# Patient Record
Sex: MALE | Race: WHITE | Employment: OTHER | URBAN - METROPOLITAN AREA
[De-identification: names, ages, dates, MRNs, and addresses within clinical notes are randomized per-mention and may not be internally consistent; named-entity substitution may affect disease eponyms.]

---

## 2017-02-03 ENCOUNTER — HOSPITAL ENCOUNTER (OUTPATIENT)
Dept: OTHER | Age: 72
Discharge: HOME | End: 2017-02-03
Attending: INTERNAL MEDICINE | Admitting: INTERNAL MEDICINE

## 2017-02-03 LAB
FERRITIN: 89 NG/ML (ref 30–400)
IRON: 99 UG/DL (ref 59–158)
PCT TRANSFERRIN: 29 % (ref 10–44)
TOTAL IRON BINDING CAPACITY: 344 UG/DL (ref 250–450)
UNSATURATED IRON BINDING CAPACITY: 245 UG/DL (ref 110–370)
VITAMIN B-12: 218 PG/ML (ref 211–911)

## 2017-02-06 LAB — METHYLMALONIC ACID: 0.38

## 2017-08-09 ENCOUNTER — HOSPITAL ENCOUNTER (OUTPATIENT)
Dept: OTHER | Age: 72
Discharge: HOME | End: 2017-08-09
Attending: INTERNAL MEDICINE | Admitting: INTERNAL MEDICINE

## 2017-08-09 LAB
FERRITIN: 96 NG/ML (ref 30–400)
IRON: 66 UG/DL (ref 59–158)
PCT TRANSFERRIN: 20 % (ref 10–44)
TOTAL IRON BINDING CAPACITY: 337 UG/DL (ref 250–450)
UNSATURATED IRON BINDING CAPACITY: 271 UG/DL (ref 110–370)
VITAMIN B-12: 194.2 PG/ML (ref 211–911)

## 2017-11-09 ENCOUNTER — HOSPITAL ENCOUNTER (OUTPATIENT)
Dept: OTHER | Age: 72
Discharge: HOME | End: 2017-11-09
Attending: INTERNAL MEDICINE | Admitting: INTERNAL MEDICINE

## 2017-11-09 LAB
FOLATE: >20 NG/ML (ref 3.1–17.5)
IRON: 115 UG/DL (ref 59–158)
PCT TRANSFERRIN: 35 % (ref 10–44)
TOTAL IRON BINDING CAPACITY: 329 UG/DL (ref 250–450)
TSH HIGH SENSITIVITY: 0.96 UIU/ML (ref 0.27–4.2)
UNSATURATED IRON BINDING CAPACITY: 214 UG/DL (ref 110–370)
VITAMIN B-12: 807.4 PG/ML (ref 211–911)

## 2017-11-12 LAB — METHYLMALONIC ACID: 0.25

## 2017-11-13 LAB
GASTRIC PARIETAL CELL ANTIBODY: 3.1
INTRINSIC FACTOR AB: NEGATIVE

## 2018-05-15 ENCOUNTER — HOSPITAL ENCOUNTER (OUTPATIENT)
Dept: OTHER | Age: 73
Discharge: HOME | End: 2018-05-15
Attending: INTERNAL MEDICINE | Admitting: INTERNAL MEDICINE

## 2018-05-15 LAB
ALBUMIN SERPL-MCNC: 4.4 GM/DL (ref 3.4–5)
ALP BLD-CCNC: 39 IU/L (ref 40–129)
ALT SERPL-CCNC: 12 U/L (ref 10–40)
ANION GAP SERPL CALCULATED.3IONS-SCNC: 11 MMOL/L (ref 4–16)
AST SERPL-CCNC: 16 IU/L (ref 15–37)
BILIRUB SERPL-MCNC: 0.3 MG/DL (ref 0–1)
BUN BLDV-MCNC: 19 MG/DL (ref 6–23)
CALCIUM SERPL-MCNC: 9.4 MG/DL (ref 8.3–10.6)
CHLORIDE BLD-SCNC: 105 MMOL/L (ref 99–110)
CO2: 29 MMOL/L (ref 21–32)
CREAT SERPL-MCNC: 1 MG/DL (ref 0.9–1.3)
FERRITIN: 115 NG/ML (ref 30–400)
FOLATE: >20 NG/ML (ref 3.1–17.5)
GFR AFRICAN AMERICAN: >60 ML/MIN/1.73M2
GFR NON-AFRICAN AMERICAN: >60 ML/MIN/1.73M2
GLUCOSE BLD-MCNC: 110 MG/DL (ref 70–99)
IRON: 67 UG/DL (ref 59–158)
PCT TRANSFERRIN: 22 % (ref 10–44)
POTASSIUM SERPL-SCNC: 4.4 MMOL/L (ref 3.5–5.1)
SODIUM BLD-SCNC: 145 MMOL/L (ref 135–145)
TOTAL IRON BINDING CAPACITY: 309 UG/DL (ref 250–450)
TOTAL PROTEIN: 6.5 GM/DL (ref 6.4–8.2)
UNSATURATED IRON BINDING CAPACITY: 242 UG/DL (ref 110–370)
VITAMIN B-12: 1151 PG/ML (ref 211–911)

## 2018-09-28 ENCOUNTER — OFFICE VISIT (OUTPATIENT)
Dept: CARDIOLOGY CLINIC | Age: 73
End: 2018-09-28
Payer: MEDICARE

## 2018-09-28 VITALS
WEIGHT: 168.4 LBS | SYSTOLIC BLOOD PRESSURE: 126 MMHG | BODY MASS INDEX: 26.43 KG/M2 | HEART RATE: 52 BPM | HEIGHT: 67 IN | DIASTOLIC BLOOD PRESSURE: 60 MMHG

## 2018-09-28 DIAGNOSIS — Z76.89 ENCOUNTER TO ESTABLISH CARE: Primary | ICD-10-CM

## 2018-09-28 PROCEDURE — 93000 ELECTROCARDIOGRAM COMPLETE: CPT | Performed by: INTERNAL MEDICINE

## 2018-09-28 PROCEDURE — 99203 OFFICE O/P NEW LOW 30 MIN: CPT | Performed by: INTERNAL MEDICINE

## 2018-09-28 RX ORDER — CARVEDILOL 6.25 MG/1
3.12 TABLET ORAL 2 TIMES DAILY WITH MEALS
COMMUNITY
End: 2021-07-07

## 2018-09-28 RX ORDER — FERROUS SULFATE 325(65) MG
325 TABLET ORAL
COMMUNITY

## 2018-09-28 RX ORDER — VITAMIN B COMPLEX
1 CAPSULE ORAL DAILY
COMMUNITY

## 2018-09-28 RX ORDER — BUDESONIDE 3 MG/1
3 CAPSULE, COATED PELLETS ORAL DAILY
Status: ON HOLD | COMMUNITY
Start: 2018-08-26 | End: 2022-06-28 | Stop reason: HOSPADM

## 2018-09-28 RX ORDER — METFORMIN HYDROCHLORIDE 500 MG/1
500 TABLET, EXTENDED RELEASE ORAL 2 TIMES DAILY WITH MEALS
COMMUNITY

## 2018-09-28 RX ORDER — FENOFIBRATE 145 MG/1
145 TABLET, COATED ORAL DAILY
COMMUNITY
Start: 2018-08-03

## 2018-09-28 NOTE — PROGRESS NOTES
Weight Loss   · Eyes: No Decreased Vision  · ENT: No Headaches, Hearing Loss or Vertigo  · Cardiovascular: No chest pain, dyspnea on exertion, palpitations or loss of consciousness  · Respiratory: No cough or wheezing    · Gastrointestinal: No abdominal pain, appetite loss, blood in stools, constipation, diarrhea or heartburn  · Genitourinary: No dysuria, trouble voiding, or hematuria  · Musculoskeletal:  No gait disturbance, weakness or joint complaints  · Integumentary: No rash or pruritis  · Neurological: No TIA or stroke symptoms  · Psychiatric: No anxiety or depression  · Endocrine: No malaise, fatigue or temperature intolerance  · Hematologic/Lymphatic: No bleeding problems, blood clots or swollen lymph nodes  · Allergic/Immunologic: No nasal congestion or hives  All systems negative except as marked. ·   ·      Physical Examination:    Vitals:    09/28/18 1626   BP: 126/60   Pulse: 52    rr 14  Afebrile    Wt Readings from Last 3 Encounters:   09/28/18 168 lb 6.4 oz (76.4 kg)   12/22/13 175 lb (79.4 kg)   12/20/13 175 lb (79.4 kg)     Body mass index is 26.38 kg/m². General Appearance:  No distress, conversant    Constitutional:  Well developed, Well nourished, No acute distress, Non-toxic appearance. HENT:  Normocephalic, Atraumatic, Bilateral external ears normal, Oropharynx moist, No oral exudates, Nose normal. Neck- Normal range of motion, No tenderness, Supple, No stridor,no apical-carotid delay, no carotid bruit  Eyes:  PERRL, EOMI, Conjunctiva normal, No discharge. Respiratory:  Normal breath sounds, No respiratory distress, No wheezing, No chest tenderness. ,no use of accessory muscles, diaphragm movement is normal  Cardiovascular: (PMI) apex non displaced,no lifts no thrills, no s3,no s4, Normal heart rate, Normal rhythm, No murmurs, No rubs, No gallops.  Carotid arteries pulse and amplitude are normal no bruit, no abdominal bruit noted ( normal abdominal aorta ausculation), femoral arteries

## 2018-11-20 ENCOUNTER — HOSPITAL ENCOUNTER (OUTPATIENT)
Age: 73
Setting detail: SPECIMEN
Discharge: HOME | End: 2018-11-20
Payer: MEDICARE

## 2018-11-20 LAB
FERRITIN: 111 NG/ML (ref 30–400)
IRON: 88 UG/DL (ref 59–158)

## 2018-11-20 PROCEDURE — 83540 ASSAY OF IRON: CPT

## 2018-11-20 PROCEDURE — 82728 ASSAY OF FERRITIN: CPT

## 2018-11-26 ENCOUNTER — NURSE ONLY (OUTPATIENT)
Dept: CARDIOLOGY CLINIC | Age: 73
End: 2018-11-26

## 2018-11-26 VITALS — BODY MASS INDEX: 25.61 KG/M2 | HEIGHT: 68 IN | WEIGHT: 169 LBS

## 2018-11-26 DIAGNOSIS — Z76.89 ENCOUNTER TO ESTABLISH CARE: ICD-10-CM

## 2018-11-26 DIAGNOSIS — R94.31 EKG ABNORMALITIES: Primary | ICD-10-CM

## 2018-11-26 PROCEDURE — 93000 ELECTROCARDIOGRAM COMPLETE: CPT | Performed by: INTERNAL MEDICINE

## 2018-12-01 ENCOUNTER — PROCEDURE VISIT (OUTPATIENT)
Dept: CARDIOLOGY CLINIC | Age: 73
End: 2018-12-01
Payer: MEDICARE

## 2018-12-01 DIAGNOSIS — I65.29 STENOSIS OF CAROTID ARTERY, UNSPECIFIED LATERALITY: ICD-10-CM

## 2018-12-01 DIAGNOSIS — I65.23 CAROTID STENOSIS, BILATERAL: Primary | ICD-10-CM

## 2018-12-01 PROCEDURE — 93880 EXTRACRANIAL BILAT STUDY: CPT | Performed by: INTERNAL MEDICINE

## 2018-12-11 PROBLEM — K40.90 RIGHT INGUINAL HERNIA: Status: ACTIVE | Noted: 2018-12-11

## 2019-01-11 PROBLEM — Z09 STATUS POST RIGHT INGUINAL HERNIA REPAIR, FOLLOW-UP EXAM: Status: ACTIVE | Noted: 2019-01-11

## 2019-02-10 PROBLEM — Z09 STATUS POST RIGHT INGUINAL HERNIA REPAIR, FOLLOW-UP EXAM: Status: RESOLVED | Noted: 2019-01-11 | Resolved: 2019-02-10

## 2019-04-05 ENCOUNTER — OFFICE VISIT (OUTPATIENT)
Dept: CARDIOLOGY CLINIC | Age: 74
End: 2019-04-05
Payer: MEDICARE

## 2019-04-05 VITALS
SYSTOLIC BLOOD PRESSURE: 120 MMHG | WEIGHT: 175 LBS | HEART RATE: 60 BPM | BODY MASS INDEX: 26.52 KG/M2 | HEIGHT: 68 IN | DIASTOLIC BLOOD PRESSURE: 70 MMHG

## 2019-04-05 DIAGNOSIS — I25.10 CORONARY ARTERY DISEASE INVOLVING NATIVE CORONARY ARTERY OF NATIVE HEART WITHOUT ANGINA PECTORIS: Primary | ICD-10-CM

## 2019-04-05 PROCEDURE — 3017F COLORECTAL CA SCREEN DOC REV: CPT | Performed by: INTERNAL MEDICINE

## 2019-04-05 PROCEDURE — 1036F TOBACCO NON-USER: CPT | Performed by: INTERNAL MEDICINE

## 2019-04-05 PROCEDURE — G8598 ASA/ANTIPLAT THER USED: HCPCS | Performed by: INTERNAL MEDICINE

## 2019-04-05 PROCEDURE — 1123F ACP DISCUSS/DSCN MKR DOCD: CPT | Performed by: INTERNAL MEDICINE

## 2019-04-05 PROCEDURE — G8427 DOCREV CUR MEDS BY ELIG CLIN: HCPCS | Performed by: INTERNAL MEDICINE

## 2019-04-05 PROCEDURE — 4040F PNEUMOC VAC/ADMIN/RCVD: CPT | Performed by: INTERNAL MEDICINE

## 2019-04-05 PROCEDURE — 99213 OFFICE O/P EST LOW 20 MIN: CPT | Performed by: INTERNAL MEDICINE

## 2019-04-05 PROCEDURE — G8419 CALC BMI OUT NRM PARAM NOF/U: HCPCS | Performed by: INTERNAL MEDICINE

## 2019-04-05 NOTE — PROGRESS NOTES
Nelly Shelton MD        OFFICE  FOLLOWUP NOTE    Chief complaints: patient is here for management of CAD, PTCA IN 6748,7913, DM    Subjective: patient feels better, no chest pain, no shortness of breath, no dizziness, no palpitations    HPI Randi Costello is a 72 y. o.year old who  has a past medical history of Acute MI ([facility] [address] Arthritis, CAD (coronary artery disease), and Diabetes mellitus ([facility] and presents for management of CAD, PTCA IN 2797,0321, DM, which are well controlled      Current Outpatient Medications   Medication Sig Dispense Refill    fenofibrate (TRICOR) 145 MG tablet Take 145 mg by mouth daily      budesonide (ENTOCORT EC) 3 MG extended release capsule Take 3 mg by mouth daily      metFORMIN (GLUCOPHAGE-XR) 500 MG extended release tablet Take 500 mg by mouth daily (with breakfast)      b complex vitamins capsule Take 1 capsule by mouth daily      ferrous sulfate 325 (65 Fe) MG tablet Take 325 mg by mouth daily (with breakfast)      Cyanocobalamin (VITAMIN B 12 PO) Take by mouth      carvedilol (COREG) 6.25 MG tablet Take 6.25 mg by mouth 2 times daily (with meals)      pregabalin (LYRICA) 75 MG capsule Take 75 mg by mouth 3 times daily.  aspirin 81 MG tablet Take 81 mg by mouth daily       atorvastatin (LIPITOR) 10 MG tablet Take 10 mg by mouth daily. No current facility-administered medications for this visit. Allergies: Patient has no known allergies. Past Medical History:   Diagnosis Date    [facility] ([facility]     1993 - angioplasty, no stent, Dr. Lizzie Johnson CAD (coronary artery disease)     Diabetes mellitus ([facility]      Past Surgical History:   Procedure Laterality Date    BALLOON ANGIOPLASTY, ARTERY      CARDIAC CATHETERIZATION      CATARACT REMOVAL      TOE SURGERY       History reviewed. No pertinent family history.   Social History     Tobacco Use    Smoking status: Former Smoker    Smokeless tobacco: Never Used   Substance Use Topics    Alcohol use: No      [unfilled]  Review of Systems:   · Constitutional: No Fever or Weight Loss   · Eyes: No Decreased Vision  · ENT: No Headaches, Hearing Loss or Vertigo  · Cardiovascular: No chest pain, dyspnea on exertion, palpitations or loss of consciousness  · Respiratory: No cough or wheezing    · Gastrointestinal: No abdominal pain, appetite loss, blood in stools, constipation, diarrhea or heartburn  · Genitourinary: No dysuria, trouble voiding, or hematuria  · Musculoskeletal:  No gait disturbance, weakness or joint complaints  · Integumentary: No rash or pruritis  · Neurological: No TIA or stroke symptoms  · Psychiatric: No anxiety or depression  · Endocrine: No malaise, fatigue or temperature intolerance  · Hematologic/Lymphatic: No bleeding problems, blood clots or swollen lymph nodes  · Allergic/Immunologic: No nasal congestion or hives  All systems negative except as marked. Objective:  /70   Pulse 60   Ht 5' 8\" (1.727 m)   Wt 175 lb (79.4 kg)   BMI 26.61 kg/m²   Wt Readings from Last 3 Encounters:   04/05/19 175 lb (79.4 kg)   01/11/19 173 lb (78.5 kg)   12/10/18 169 lb (76.7 kg)     Body mass index is 26.61 kg/m². GENERAL - Alert, oriented, pleasant, in no apparent distress,normal grooming  HEENT - pupils are reactive to light and accomodation, cornea intact, conjunctive normal color, ears are normal in exam,throat exam in normal, teeth, gum and palate are normal, oral mucosa is normal without any notation of pallor or cyanosis  Neck - Supple. No jugular venous distention noted. No carotid bruits, no apical -carotid delay  Respiratory:  Normal breath sounds, No respiratory distress, No wheezing, No chest tenderness. ,no use of accessory muscles, diaphragm movement is normal  Cardiovascular: (PMI) apex non displaced,no lifts no thrills, no s3,no s4, Normal heart rate, Normal rhythm, No murmurs, No rubs, No gallops.  Carotid arteries pulse and amplitude are normal no bruit, no abdominal bruit noted ( normal abdominal aorta ausculation), femoral arteries pulse and amplitude are normal no bruit, pedal pulses are normal  Femoral pulses have normal amplitude, no bruits   Extremities - No cyanosis, clubbing, or significant edema, no varicose veins    Abdomen - No masses, tenderness, or organomegaly, no hepato-splenomegally, no bruits  Musculoskeletal - No significant edema, no kyphosis or scoliosis, no deformity in any extremity noted, muscle strength and tone are normal  Skin: no ulcer,no scar,no stasis dermatitis   Neurologic - alert oriented times 3,Cranial nerves II through XII are grossly intact. There were no gross focal neurologic abnormalities. All sensory and motor nerves examined and were normal  Psychiatric: normal mood and affect    No results found for: CKTOTAL, CKMB, CKMBINDEX, TROPONINI  BNP:  No results found for: BNP  PT/INR:  No results found for: PTINR  Lab Results   Component Value Date    LABA1C 6.6 (H) 03/17/2014    LABA1C 6.8 (H) 12/14/2013     Lab Results   Component Value Date    CHOL 110 03/17/2014    TRIG 73 03/17/2014    HDL 35 (L) 03/17/2014    LDLDIRECT 61 03/17/2014     Lab Results   Component Value Date    ALT 12 05/15/2018    AST 16 05/15/2018     TSH:  No results found for: TSH    Impression:  Db Gustafson is a 72 y. o.year old who  has a past medical history of Acute MI ([facility] [address] Arthritis, CAD (coronary artery disease), and Diabetes mellitus ([facility] [address] and presents with     Plan:  1. CAD: STABLE, continue aspirin, statins, coreg, stress test and echo ordered  2. DM:CONTINUE metformin  3. Dyslipidemia: continue statins      All labs, medications and tests reviewed, continue all other medications of all above medical condition listed as is.     [unfilled]

## 2019-04-09 ENCOUNTER — PROCEDURE VISIT (OUTPATIENT)
Dept: CARDIOLOGY CLINIC | Age: 74
End: 2019-04-09
Payer: MEDICARE

## 2019-04-09 DIAGNOSIS — I25.10 CORONARY ARTERY DISEASE INVOLVING NATIVE CORONARY ARTERY OF NATIVE HEART WITHOUT ANGINA PECTORIS: ICD-10-CM

## 2019-04-09 LAB
LV EF: 66 %
LVEF MODALITY: NORMAL

## 2019-04-09 PROCEDURE — 93016 CV STRESS TEST SUPVJ ONLY: CPT | Performed by: INTERNAL MEDICINE

## 2019-04-09 PROCEDURE — A9500 TC99M SESTAMIBI: HCPCS | Performed by: INTERNAL MEDICINE

## 2019-04-09 PROCEDURE — 93017 CV STRESS TEST TRACING ONLY: CPT | Performed by: INTERNAL MEDICINE

## 2019-04-09 PROCEDURE — 93018 CV STRESS TEST I&R ONLY: CPT | Performed by: INTERNAL MEDICINE

## 2019-04-09 PROCEDURE — 78452 HT MUSCLE IMAGE SPECT MULT: CPT | Performed by: INTERNAL MEDICINE

## 2019-04-11 ENCOUNTER — TELEPHONE (OUTPATIENT)
Dept: CARDIOLOGY CLINIC | Age: 74
End: 2019-04-11

## 2019-04-11 NOTE — TELEPHONE ENCOUNTER
Notified pt of results.       Summary    Supervising physician Dr. Rosetta Wise .   Javier Courtney LV function.   Alessandro Quinonez is normal isotope uptake following exercise and at rest. There is no    evidence of exercise induced ischemia.    This is a normal study.

## 2019-04-18 ENCOUNTER — PROCEDURE VISIT (OUTPATIENT)
Dept: CARDIOLOGY CLINIC | Age: 74
End: 2019-04-18
Payer: MEDICARE

## 2019-04-18 DIAGNOSIS — I25.10 CORONARY ARTERY DISEASE INVOLVING NATIVE CORONARY ARTERY OF NATIVE HEART WITHOUT ANGINA PECTORIS: Primary | ICD-10-CM

## 2019-04-18 LAB
LV EF: 58 %
LVEF MODALITY: NORMAL

## 2019-04-18 PROCEDURE — 93306 TTE W/DOPPLER COMPLETE: CPT | Performed by: INTERNAL MEDICINE

## 2019-04-22 ENCOUNTER — TELEPHONE (OUTPATIENT)
Dept: CARDIOLOGY CLINIC | Age: 74
End: 2019-04-22

## 2019-05-07 ENCOUNTER — OFFICE VISIT (OUTPATIENT)
Dept: CARDIOLOGY CLINIC | Age: 74
End: 2019-05-07
Payer: MEDICARE

## 2019-05-07 VITALS
HEART RATE: 64 BPM | BODY MASS INDEX: 26.07 KG/M2 | HEIGHT: 68 IN | DIASTOLIC BLOOD PRESSURE: 62 MMHG | SYSTOLIC BLOOD PRESSURE: 108 MMHG | WEIGHT: 172 LBS

## 2019-05-07 DIAGNOSIS — R07.9 CHEST PAIN, UNSPECIFIED TYPE: Primary | ICD-10-CM

## 2019-05-07 PROCEDURE — G8598 ASA/ANTIPLAT THER USED: HCPCS | Performed by: INTERNAL MEDICINE

## 2019-05-07 PROCEDURE — 99213 OFFICE O/P EST LOW 20 MIN: CPT | Performed by: INTERNAL MEDICINE

## 2019-05-07 PROCEDURE — 1036F TOBACCO NON-USER: CPT | Performed by: INTERNAL MEDICINE

## 2019-05-07 PROCEDURE — G8427 DOCREV CUR MEDS BY ELIG CLIN: HCPCS | Performed by: INTERNAL MEDICINE

## 2019-05-07 PROCEDURE — G8419 CALC BMI OUT NRM PARAM NOF/U: HCPCS | Performed by: INTERNAL MEDICINE

## 2019-05-07 PROCEDURE — 3017F COLORECTAL CA SCREEN DOC REV: CPT | Performed by: INTERNAL MEDICINE

## 2019-05-07 PROCEDURE — 4040F PNEUMOC VAC/ADMIN/RCVD: CPT | Performed by: INTERNAL MEDICINE

## 2019-05-07 PROCEDURE — 1123F ACP DISCUSS/DSCN MKR DOCD: CPT | Performed by: INTERNAL MEDICINE

## 2019-05-07 NOTE — PROGRESS NOTES
CATARACT REMOVAL      TOE SURGERY       Family History   Problem Relation Age of Onset    Heart Attack Father     Heart Disease Father      Social History     Tobacco Use    Smoking status: Former Smoker    Smokeless tobacco: Never Used   Substance Use Topics    Alcohol use: No      [unfilled]  Review of Systems:   · Constitutional: No Fever or Weight Loss   · Eyes: No Decreased Vision  · ENT: No Headaches, Hearing Loss or Vertigo  · Cardiovascular: No chest pain, dyspnea on exertion, palpitations or loss of consciousness  · Respiratory: No cough or wheezing    · Gastrointestinal: No abdominal pain, appetite loss, blood in stools, constipation, diarrhea or heartburn  · Genitourinary: No dysuria, trouble voiding, or hematuria  · Musculoskeletal:  No gait disturbance, weakness or joint complaints  · Integumentary: No rash or pruritis  · Neurological: No TIA or stroke symptoms  · Psychiatric: No anxiety or depression  · Endocrine: No malaise, fatigue or temperature intolerance  · Hematologic/Lymphatic: No bleeding problems, blood clots or swollen lymph nodes  · Allergic/Immunologic: No nasal congestion or hives  All systems negative except as marked. Objective:  /62 (Site: Right Upper Arm, Position: Sitting, Cuff Size: Medium Adult) Comment: Resting BP. Pulse 64   Ht 5' 8\" (1.727 m)   Wt 172 lb (78 kg)   BMI 26.15 kg/m²   Wt Readings from Last 3 Encounters:   05/07/19 172 lb (78 kg)   04/05/19 175 lb (79.4 kg)   01/11/19 173 lb (78.5 kg)     Body mass index is 26.15 kg/m². GENERAL - Alert, oriented, pleasant, in no apparent distress,normal grooming  HEENT - pupils are reactive to light and accomodation, cornea intact, conjunctive normal color, ears are normal in exam,throat exam in normal, teeth, gum and palate are normal, oral mucosa is normal without any notation of pallor or cyanosis  Neck - Supple. No jugular venous distention noted.  No carotid bruits, no apical -carotid delay  Respiratory: Normal breath sounds, No respiratory distress, No wheezing, No chest tenderness. ,no use of accessory muscles, diaphragm movement is normal  Cardiovascular: (PMI) apex non displaced,no lifts no thrills, no s3,no s4, Normal heart rate, Normal rhythm, No murmurs, No rubs, No gallops. Carotid arteries pulse and amplitude are normal no bruit, no abdominal bruit noted ( normal abdominal aorta ausculation), femoral arteries pulse and amplitude are normal no bruit, pedal pulses are normal  Femoral pulses have normal amplitude, no bruits   Extremities - No cyanosis, clubbing, or significant edema, no varicose veins    Abdomen - No masses, tenderness, or organomegaly, no hepato-splenomegally, no bruits  Musculoskeletal - No significant edema, no kyphosis or scoliosis, no deformity in any extremity noted, muscle strength and tone are normal  Skin: no ulcer,no scar,no stasis dermatitis   Neurologic - alert oriented times 3,Cranial nerves II through XII are grossly intact. There were no gross focal neurologic abnormalities. All sensory and motor nerves examined and were normal  Psychiatric: normal mood and affect    No results found for: CKTOTAL, CKMB, CKMBINDEX, TROPONINI  BNP:  No results found for: BNP  PT/INR:  No results found for: PTINR  Lab Results   Component Value Date    LABA1C 6.6 (H) 03/17/2014    LABA1C 6.8 (H) 12/14/2013     Lab Results   Component Value Date    CHOL 110 03/17/2014    TRIG 73 03/17/2014    HDL 35 (L) 03/17/2014    LDLDIRECT 61 03/17/2014     Lab Results   Component Value Date    ALT 12 05/15/2018    AST 16 05/15/2018     TSH:  No results found for: TSH    Impression:  Yoseph Fish is a 72 y. o.year old who  has a past medical history of Acute MI ([facility] [address] Arthritis, CAD (coronary artery disease), Diabetes mellitus ([facility] [address] H/O cardiovascular stress test, and H/O echocardiogram. and presents with     Plan:  1.  CAD: normal stress test and echo, Continue aspirin, continue statins, ACEinhibitors, betablockers  2. DM:not controlled, recommend to keep hA1C around 7, recommend to continue metformin  3. Dyslipidemia: continue statins  4. HTN: stable, continue coreg medicatons  5. Health maintenance: exerise and diet  All labs, medications and tests reviewed, continue all other medications of all above medical condition listed as is.     [unfilled]

## 2019-05-21 ENCOUNTER — HOSPITAL ENCOUNTER (OUTPATIENT)
Age: 74
Setting detail: SPECIMEN
Discharge: HOME | End: 2019-05-21
Payer: MEDICARE

## 2019-05-21 LAB
FERRITIN: 105 NG/ML (ref 30–400)
IRON: 65 UG/DL (ref 59–158)
PCT TRANSFERRIN: 20 % (ref 10–44)
TOTAL IRON BINDING CAPACITY: 328 UG/DL (ref 250–450)
UNSATURATED IRON BINDING CAPACITY: 263 UG/DL (ref 110–370)

## 2019-05-21 PROCEDURE — 83550 IRON BINDING TEST: CPT

## 2019-05-21 PROCEDURE — 82728 ASSAY OF FERRITIN: CPT

## 2019-05-21 PROCEDURE — 83540 ASSAY OF IRON: CPT

## 2019-11-08 ENCOUNTER — OFFICE VISIT (OUTPATIENT)
Dept: CARDIOLOGY CLINIC | Age: 74
End: 2019-11-08
Payer: MEDICARE

## 2019-11-08 VITALS
SYSTOLIC BLOOD PRESSURE: 130 MMHG | BODY MASS INDEX: 25.01 KG/M2 | HEART RATE: 60 BPM | DIASTOLIC BLOOD PRESSURE: 80 MMHG | WEIGHT: 165 LBS | HEIGHT: 68 IN

## 2019-11-08 DIAGNOSIS — I25.10 CORONARY ARTERY DISEASE INVOLVING NATIVE CORONARY ARTERY OF NATIVE HEART WITHOUT ANGINA PECTORIS: Primary | ICD-10-CM

## 2019-11-08 PROCEDURE — 1123F ACP DISCUSS/DSCN MKR DOCD: CPT | Performed by: INTERNAL MEDICINE

## 2019-11-08 PROCEDURE — 1036F TOBACCO NON-USER: CPT | Performed by: INTERNAL MEDICINE

## 2019-11-08 PROCEDURE — 99214 OFFICE O/P EST MOD 30 MIN: CPT | Performed by: INTERNAL MEDICINE

## 2019-11-08 PROCEDURE — G8419 CALC BMI OUT NRM PARAM NOF/U: HCPCS | Performed by: INTERNAL MEDICINE

## 2019-11-08 PROCEDURE — G8427 DOCREV CUR MEDS BY ELIG CLIN: HCPCS | Performed by: INTERNAL MEDICINE

## 2019-11-08 PROCEDURE — G8598 ASA/ANTIPLAT THER USED: HCPCS | Performed by: INTERNAL MEDICINE

## 2019-11-08 PROCEDURE — 4040F PNEUMOC VAC/ADMIN/RCVD: CPT | Performed by: INTERNAL MEDICINE

## 2019-11-08 PROCEDURE — 3017F COLORECTAL CA SCREEN DOC REV: CPT | Performed by: INTERNAL MEDICINE

## 2019-11-08 PROCEDURE — G8484 FLU IMMUNIZE NO ADMIN: HCPCS | Performed by: INTERNAL MEDICINE

## 2019-11-22 ENCOUNTER — HOSPITAL ENCOUNTER (OUTPATIENT)
Age: 74
Setting detail: SPECIMEN
Discharge: HOME | End: 2019-11-22
Payer: MEDICARE

## 2019-11-22 LAB
FERRITIN: 114 NG/ML (ref 30–400)
IRON: 51 UG/DL (ref 59–158)
PCT TRANSFERRIN: 16 % (ref 10–44)
TOTAL IRON BINDING CAPACITY: 312 UG/DL (ref 250–450)
UNSATURATED IRON BINDING CAPACITY: 261 UG/DL (ref 110–370)
VITAMIN B-12: 479.8 PG/ML (ref 211–911)

## 2019-11-22 PROCEDURE — 82607 VITAMIN B-12: CPT

## 2019-11-22 PROCEDURE — 83550 IRON BINDING TEST: CPT

## 2019-11-22 PROCEDURE — 83540 ASSAY OF IRON: CPT

## 2019-11-22 PROCEDURE — 82728 ASSAY OF FERRITIN: CPT

## 2020-04-10 PROBLEM — D63.8 ANEMIA OF CHRONIC DISORDER: Status: ACTIVE | Noted: 2020-04-10

## 2020-04-10 PROBLEM — D64.9 ANEMIA, UNSPECIFIED: Status: ACTIVE | Noted: 2020-04-10

## 2020-04-10 PROBLEM — D50.0 IRON DEFICIENCY ANEMIA SECONDARY TO BLOOD LOSS (CHRONIC): Status: ACTIVE | Noted: 2020-04-10

## 2020-05-15 ENCOUNTER — VIRTUAL VISIT (OUTPATIENT)
Dept: CARDIOLOGY CLINIC | Age: 75
End: 2020-05-15
Payer: MEDICARE

## 2020-05-15 VITALS — BODY MASS INDEX: 25.01 KG/M2 | HEIGHT: 68 IN | WEIGHT: 165 LBS

## 2020-05-15 PROCEDURE — 99441 PR PHYS/QHP TELEPHONE EVALUATION 5-10 MIN: CPT | Performed by: INTERNAL MEDICINE

## 2020-05-15 NOTE — PROGRESS NOTES
Coronavirus Preparedness and Response Supplemental Appropriations Act, this Virtual Visit was conducted with patient's (and/or legal guardian's) consent, to reduce the patient's risk of exposure to COVID-19 and provide necessary medical care. The patient (and/or legal guardian) has also been advised to contact this office for worsening conditions or problems, and seek emergency medical treatment and/or call 911 if deemed necessary. Services were provided through a AUDIO synchronous discussion virtually to substitute for in-person clinic visit. Patient and provider were located at their individual homes. 1.   I confirm that this visit was completed in a telehealth setting ,using synchronous audiovisual technology for real time patient interaction . The patient identity with name and date of birth was confirmed . This evaluation of patient was done by telehealth in the setting of [facility] emergency , which precluded assurance of safe in person visit at the time of service. The patient consented to and accepts potential risks associated with telemedical evaluation and care was taken to assess theo presence of any medical issues that would be more  appropriate for expedited in -person care. Pursuant to the emergency declaration under the [facility], [facility] waiver authority and the [employer] Act, this Virtual  Visit was conducted, with patient's consent, to reduce the patient's risk of exposure to COVID-19 and provide continuity of care for an established patient. Services were provided through a video synchronous discussion virtually to substitute for in-person clinic visit. 2. I Affirm this is a Patient Initiated Episode with an Established Patient who has not had a related appointment within my department in the past 7 days or scheduled within the next 24 hours.       --Carrington Ruggiero MD on 5/15/2020 at

## 2020-05-28 ENCOUNTER — HOSPITAL ENCOUNTER (OUTPATIENT)
Dept: INFUSION THERAPY | Age: 75
Discharge: HOME | End: 2020-05-28
Payer: MEDICARE

## 2020-05-28 LAB
BASOPHILS ABSOLUTE: 0 K/CU MM
BASOPHILS RELATIVE PERCENT: 0.4 % (ref 0–1)
DIFFERENTIAL TYPE: ABNORMAL
EOSINOPHILS ABSOLUTE: 0.1 K/CU MM
EOSINOPHILS RELATIVE PERCENT: 2.2 % (ref 0–3)
FERRITIN: 98 NG/ML (ref 30–400)
HCT VFR BLD CALC: 34.3 % (ref 42–52)
HEMOGLOBIN: 11 GM/DL (ref 13.5–18)
IRON: 56 UG/DL (ref 59–158)
LYMPHOCYTES ABSOLUTE: 1.2 K/CU MM
LYMPHOCYTES RELATIVE PERCENT: 26.9 % (ref 24–44)
MCH RBC QN AUTO: 30.7 PG (ref 27–31)
MCHC RBC AUTO-ENTMCNC: 32.1 % (ref 32–36)
MCV RBC AUTO: 95.8 FL (ref 78–100)
MONOCYTES ABSOLUTE: 0.4 K/CU MM
MONOCYTES RELATIVE PERCENT: 9.4 % (ref 0–4)
PCT TRANSFERRIN: 17 % (ref 10–44)
PDW BLD-RTO: 13.8 % (ref 11.7–14.9)
PLATELET # BLD: 179 K/CU MM (ref 140–440)
PMV BLD AUTO: 9.7 FL (ref 7.5–11.1)
RBC # BLD: 3.58 M/CU MM (ref 4.6–6.2)
SEGMENTED NEUTROPHILS ABSOLUTE COUNT: 2.7 K/CU MM
SEGMENTED NEUTROPHILS RELATIVE PERCENT: 61.1 % (ref 36–66)
TOTAL IRON BINDING CAPACITY: 331 UG/DL (ref 250–450)
UNSATURATED IRON BINDING CAPACITY: 275 UG/DL (ref 110–370)
VITAMIN B-12: 539.3 PG/ML (ref 211–911)
WBC # BLD: 4.5 K/CU MM (ref 4–10.5)

## 2020-05-28 PROCEDURE — 83540 ASSAY OF IRON: CPT

## 2020-05-28 PROCEDURE — 82607 VITAMIN B-12: CPT

## 2020-05-28 PROCEDURE — 85025 COMPLETE CBC W/AUTO DIFF WBC: CPT

## 2020-05-28 PROCEDURE — 82728 ASSAY OF FERRITIN: CPT

## 2020-05-28 PROCEDURE — 83550 IRON BINDING TEST: CPT

## 2020-05-28 PROCEDURE — 36415 COLL VENOUS BLD VENIPUNCTURE: CPT

## 2020-06-02 ENCOUNTER — HOSPITAL ENCOUNTER (OUTPATIENT)
Dept: INFUSION THERAPY | Age: 75
Discharge: HOME | End: 2020-06-02
Payer: MEDICARE

## 2020-06-02 PROCEDURE — 99211 OFF/OP EST MAY X REQ PHY/QHP: CPT

## 2020-08-18 ENCOUNTER — VIRTUAL VISIT (OUTPATIENT)
Dept: CARDIOLOGY CLINIC | Age: 75
End: 2020-08-18
Payer: MEDICARE

## 2020-08-18 PROBLEM — E78.5 DYSLIPIDEMIA: Status: ACTIVE | Noted: 2020-08-18

## 2020-08-18 PROBLEM — I10 ESSENTIAL HYPERTENSION: Status: ACTIVE | Noted: 2020-08-18

## 2020-08-18 PROCEDURE — 99442 PR PHYS/QHP TELEPHONE EVALUATION 11-20 MIN: CPT | Performed by: NURSE PRACTITIONER

## 2020-08-18 NOTE — ASSESSMENT & PLAN NOTE
 Patient understood unsure if blood pressure is accurate as his blood pressure machine has been giving erratic numbers and his readings are much higher than where he normally runs.  Encourage patient to take blood pressure machine with him to see Dr. Marisela Gale for evaluation if blood pressure cuff is reading correctly. If blood pressure cuff is reading correctly patient medications will need adjusted.  To continue Beta blocker, Diuretic   advised low salt diet   Last echo 4/ 20109 Summary   Left ventricular systolic function is normal with an ejection fraction of 55-60%. No significant valvular disease or diastolic dysfunction noted.    Essentially normal echocardiogram.

## 2020-08-18 NOTE — ASSESSMENT & PLAN NOTE
Patient states that cholesterol is managed by Dr. Calvin Samano. Patient is seeing Dr. Calvin Samano in 2 weeks and is going to have blood drawn. Cathy Vaughn will ask for blood work to be copied and sent to office. Patient to continue fenofibrate and atorvastatin.

## 2020-08-18 NOTE — ASSESSMENT & PLAN NOTE
 Patient had an acute MI in 1994 with a POBA and then again in [facility] Patient is not having cardiac symptoms   continue BB, antiplatelet, ASA   Low salt, Low cholesterol diet   Last stress test 4/2019 normal study

## 2020-08-18 NOTE — LETTER
Ann Marie Raya  1945  [id number]    Have you had any Chest Pain - No      Have you had any Shortness of Breath - No      Have you had any dizziness - No      Have you had any palpitations -no    Is the patient on any of the following medications - no  If Yes DO EKG    Do you have any edema - swelling in feet    If Yes - CHECK TO SEE IF THE EDEMA IS PITTING  How long have they been having edema - Weeks  If Yes - Have they worn compression stockings No    Check Venous \"LEG PROBLEM Questionnaire\"    Do you have a surgery or procedure scheduled in the near future - No      Ask patient if they want to [facility] if they are not already signed up    Check to see if we have an E-MAIL on file for the patient    Check medication list thoroughly!!!  BE SURE TO ASK PATIENT IF THEY NEED MEDICATION REFILLS

## 2020-11-30 ENCOUNTER — HOSPITAL ENCOUNTER (OUTPATIENT)
Dept: INFUSION THERAPY | Age: 75
Discharge: HOME | End: 2020-11-30
Payer: MEDICARE

## 2020-11-30 DIAGNOSIS — D50.0 IRON DEFICIENCY ANEMIA SECONDARY TO BLOOD LOSS (CHRONIC): ICD-10-CM

## 2020-11-30 LAB
BASOPHILS ABSOLUTE: 0 K/CU MM
BASOPHILS RELATIVE PERCENT: 0.5 % (ref 0–1)
DIFFERENTIAL TYPE: ABNORMAL
EOSINOPHILS ABSOLUTE: 0.1 K/CU MM
EOSINOPHILS RELATIVE PERCENT: 1.9 % (ref 0–3)
FERRITIN: 114 NG/ML (ref 30–400)
HCT VFR BLD CALC: 37.4 % (ref 42–52)
HEMOGLOBIN: 12.2 GM/DL (ref 13.5–18)
IRON: 71 UG/DL (ref 59–158)
LYMPHOCYTES ABSOLUTE: 1.4 K/CU MM
LYMPHOCYTES RELATIVE PERCENT: 24.6 % (ref 24–44)
MCH RBC QN AUTO: 30.8 PG (ref 27–31)
MCHC RBC AUTO-ENTMCNC: 32.6 % (ref 32–36)
MCV RBC AUTO: 94.4 FL (ref 78–100)
MONOCYTES ABSOLUTE: 0.6 K/CU MM
MONOCYTES RELATIVE PERCENT: 9.9 % (ref 0–4)
PCT TRANSFERRIN: 20 % (ref 10–44)
PDW BLD-RTO: 14 % (ref 11.7–14.9)
PLATELET # BLD: 194 K/CU MM (ref 140–440)
PMV BLD AUTO: 9.4 FL (ref 7.5–11.1)
RBC # BLD: 3.96 M/CU MM (ref 4.6–6.2)
SEGMENTED NEUTROPHILS ABSOLUTE COUNT: 3.6 K/CU MM
SEGMENTED NEUTROPHILS RELATIVE PERCENT: 63.1 % (ref 36–66)
TOTAL IRON BINDING CAPACITY: 356 UG/DL (ref 250–450)
UNSATURATED IRON BINDING CAPACITY: 285 UG/DL (ref 110–370)
VITAMIN B-12: 522.2 PG/ML (ref 211–911)
WBC # BLD: 5.7 K/CU MM (ref 4–10.5)

## 2020-11-30 PROCEDURE — 83550 IRON BINDING TEST: CPT

## 2020-11-30 PROCEDURE — 85025 COMPLETE CBC W/AUTO DIFF WBC: CPT

## 2020-11-30 PROCEDURE — 82607 VITAMIN B-12: CPT

## 2020-11-30 PROCEDURE — 83540 ASSAY OF IRON: CPT

## 2020-11-30 PROCEDURE — 82728 ASSAY OF FERRITIN: CPT

## 2020-11-30 PROCEDURE — 36415 COLL VENOUS BLD VENIPUNCTURE: CPT

## 2020-12-02 NOTE — PROGRESS NOTES
Patient Name: Kiera Ocasio  Patient : 1945  Patient MRN: [id number]     Primary Oncologist: Shilpa Reagn MD  Referring Provider: Rayna Cary MD     Date of Service: 2020      Chief Complaint:   Chief Complaint   Patient presents with    Follow-up     He came in for follow-up visit. Patient Active Problem List:     Cellulitis     Edema     CAD (coronary artery disease)     Diabetes mellitus ([address]     Right inguinal hernia     Anemia, unspecified     Iron deficiency anemia secondary to blood loss (chronic)     Anemia of chronic disorder     Dyslipidemia     Essential hypertension     HPI:   Corina Craig is a pleasant 45-year-old male patient whom I have been following for chronic anemia since 2009. He had GI workup, including colonoscopy, EGD, capsule endoscopic study in  by Dr. Christine Pennington. He was found to have diverticulosis. Bone marrow study in 2009 showed trilineage hematopoietic marrow, which appeared normocellular for his age. Iron stains were adequate. FISH for MDS was negative. Flow cytometry showed no monoclonality. He was found to have mild leukopenia. He had surgery on his right toe in 2013 and was hospitalized in 2013 with cellulitis to right lower extremity. He was treated with antibiotic. MRI of the right lower extremity at the time showed findings compatible with cellulitis. Blood test in 2014 showed white cell count of 5.1, hemoglobin 11.3, platelet 712, iron was 96, TIBC 369, transferrin saturation 26, ferritin 105. He had stool guaiac by Dr. Wanda Hernandez in May 2014. He had umbilical hernia surgery in 2014 by Dr. Chilo Barajas. Blood test in 2014 showed white cell count 4.8, hemoglobin 11.4, platelet 706. Iron was 95, ferritin 102, transferrin saturation 31. Thyroid functions were within normal limits. He had colonoscopy in March or 2015.   Reportedly, he could have slight inflammation to his shot.    PAST MEDICAL HISTORY:  Heart disease, chronic arthritis, diabetes. Chronic anemia. He had surgery on his nose in February 2020. FAMILY HISTORY  No blood disorder in the family. No history of malignancy. SOCIAL HISTORY:   He quit smoking in 1993. No history of alcohol abuse. Denied illicit drug use. LABORATORY STUDIES:   Labs in August 2012 showed white cell count 4.4, hemoglobin 11.4, hematocrit 32.1, platelet 469,893. Ferritin 63, iron 69, TIBC 362, transferrin saturation 19 percent. May 2013:  Ferritin 56, iron 68, iron-binding 361, transferrin saturation 19 percent, B12 324. White cell remains stable at 4.2, hemoglobin 11.8, hematocrit 34.5, platelet 538. Review of Systems: \"Per interval history; otherwise 10 point ROS is negative. \"     Vital Signs:  BP (!) 150/57 (Site: Right Upper Arm, Position: Sitting, Cuff Size: Medium Adult)   Pulse 56   Temp 97.5 °F (36.4 °C) (Infrared)   Resp 16   Ht 5' 8\" (1.727 m)   Wt 161 lb 9.6 oz (73.3 kg)   SpO2 99%   BMI 24.57 kg/m²     Physical Exam:  CONSTITUTIONAL: awake, alert, cooperative, no apparent distress   EYES: pupils equal, round and reactive to light, sclera clear and conjunctiva normal  ENT: Normocephalic, without obvious abnormality, atraumatic  NECK: supple, symmetrical, no jugular venous distension and no carotid bruits   HEMATOLOGIC/LYMPHATIC: no cervical, supraclavicular or axillary lymphadenopathy   LUNGS: no increased work of breathing and clear to auscultation   CARDIOVASCULAR: regular rate and rhythm, normal S1 and S2, no murmur noted  ABDOMEN: normal bowel sounds x 4, soft, non-distended, non-tender, no masses palpated, no hepatosplenomegaly   MUSCULOSKELETAL: full range of motion noted, tone is normal  NEUROLOGIC: awake, alert, oriented to name, place and time. Motor skills grossly intact. SKIN: Normal skin color, texture, turgor and no jaundice. appears intact   EXTREMITIES: no LE edema. No cyanosis. Labs:  Hematology:  Lab Results   Component Value Date    WBC 5.7 11/30/2020    RBC 3.96 (L) 11/30/2020    HGB 12.2 (L) 11/30/2020    HCT 37.4 (L) 11/30/2020    MCV 94.4 11/30/2020    MCH 30.8 11/30/2020    MCHC 32.6 11/30/2020    RDW 14.0 11/30/2020     11/30/2020    MPV 9.4 11/30/2020    SEGSPCT 63.1 11/30/2020    EOSRELPCT 1.9 11/30/2020    BASOPCT 0.5 11/30/2020    LYMPHOPCT 24.6 11/30/2020    MONOPCT 9.9 (H) 11/30/2020    SEGSABS 3.6 11/30/2020    EOSABS 0.1 11/30/2020    BASOSABS 0.0 11/30/2020    LYMPHSABS 1.4 11/30/2020    MONOSABS 0.6 11/30/2020    DIFFTYPE AUTOMATED DIFFERENTIAL 11/30/2020     Lab Results   Component Value Date    ESR 26 (H) 12/20/2013     Chemistry:  Lab Results   Component Value Date     05/15/2018    K 4.4 05/15/2018     05/15/2018    CO2 29 05/15/2018    BUN 19 05/15/2018    CREATININE 1.0 05/15/2018    GLUCOSE 110 (H) 05/15/2018    CALCIUM 9.4 05/15/2018    PROT 6.5 05/15/2018    LABALBU 4.4 05/15/2018    BILITOT 0.3 05/15/2018    ALKPHOS 39 (L) 05/15/2018    AST 16 05/15/2018    ALT 12 05/15/2018    LABGLOM >60 05/15/2018    GFRAA >60 05/15/2018    POCGLU 139 (H) 12/25/2013     Lab Results   Component Value Date    MMA 0.25 11/09/2017     Lab Results   Component Value Date    TSHHS 0.961 11/09/2017    T4FREE 1.15 11/12/2014     Immunology:  Lab Results   Component Value Date    PROT 6.5 05/15/2018     Coagulation Panel:  Lab Results   Component Value Date    PROTIME 11.8 12/20/2013    INR 1.09 12/20/2013    APTT 24.5 12/20/2013     Anemia Panel:  Lab Results   Component Value Date    NVWJZZJS03 522.2 11/30/2020    FOLATE >20.0 (H) 05/15/2018     Tumor Markers:  Lab Results   Component Value Date    PSA 0.67 12/14/2013        Observations:  No data recorded      Assessment & Plan:    1. He has mild anemia. He could have a low-grade myelodysplasia and iron deficiency. He had colonoscopy in March or April 2015 by Dr. James Uribe.    B-12, methylmalonic acid were within normal limits. Blood test in May was reviewed. Hg dropped slightly. Labs in November 2019 were reviewed and stable. Blood test in May 2020 was stable to him. CBC in November 2020 was stable. He is agreeable to continue with surveillance. 2. I advised him to keep his age-appropriate cancer screening up to date. RTC in six months or sooner. All of his questions have been answered for today. Recent imaging and labs were reviewed and discussed with the patient.       Electronically signed by Joe Bauman MD on 12/2/20 at 8:45 AM EST

## 2020-12-03 ENCOUNTER — OFFICE VISIT (OUTPATIENT)
Dept: CARDIOLOGY CLINIC | Age: 75
End: 2020-12-03
Payer: MEDICARE

## 2020-12-03 VITALS
SYSTOLIC BLOOD PRESSURE: 122 MMHG | HEART RATE: 60 BPM | DIASTOLIC BLOOD PRESSURE: 74 MMHG | WEIGHT: 160 LBS | HEIGHT: 68 IN | BODY MASS INDEX: 24.25 KG/M2

## 2020-12-03 PROCEDURE — 4040F PNEUMOC VAC/ADMIN/RCVD: CPT | Performed by: INTERNAL MEDICINE

## 2020-12-03 PROCEDURE — 99215 OFFICE O/P EST HI 40 MIN: CPT | Performed by: INTERNAL MEDICINE

## 2020-12-03 PROCEDURE — G8427 DOCREV CUR MEDS BY ELIG CLIN: HCPCS | Performed by: INTERNAL MEDICINE

## 2020-12-03 PROCEDURE — 1123F ACP DISCUSS/DSCN MKR DOCD: CPT | Performed by: INTERNAL MEDICINE

## 2020-12-03 PROCEDURE — G8484 FLU IMMUNIZE NO ADMIN: HCPCS | Performed by: INTERNAL MEDICINE

## 2020-12-03 PROCEDURE — G8420 CALC BMI NORM PARAMETERS: HCPCS | Performed by: INTERNAL MEDICINE

## 2020-12-03 PROCEDURE — 1036F TOBACCO NON-USER: CPT | Performed by: INTERNAL MEDICINE

## 2020-12-03 PROCEDURE — 3017F COLORECTAL CA SCREEN DOC REV: CPT | Performed by: INTERNAL MEDICINE

## 2020-12-03 NOTE — LETTER
[address] [facility] [employer]  1945  [id number]    Have you had any Chest Pain that is not new? - No      Have you had any Shortness of Breath - No    Have you had any dizziness - No    Have you had any palpitations that are not new? - No    Do you have any edema - swelling in No      Vein \"LEG PROBLEM Questionnaire\"  1. Do you have prominent leg veins? No   2. Do you have any skin discoloration? No  3. Do you have any healed or active sores? No  4. Do you have swelling of the legs? No  5. Do you have a family history of varicose veins? No  6. Does your profession involve pro-longed        standing or heavy lifting? No  7. Have you been fighting overweight problems? No  8. Do you have restless legs? Yes  9. Do you have any night time cramps? Yes  10.  Do you have any of the following in your legs:        Aching     Do you have a surgery or procedure scheduled in the near future - No

## 2020-12-03 NOTE — PROGRESS NOTES
Hortencia Richard MD        OFFICE  FOLLOWUP NOTE    Chief complaints: patient is here for management of  CAD, PTCA [place] 4825,8196, DM, basal cell cancer  Subjective: patient feels better, no chest pain, no shortness of breath, no dizziness, no palpitations    HPI Estiven Hart is a 70 y. o.year old who  has a past medical history of Acute MI ([facility] [address] Arthritis, CAD (coronary artery disease), Diabetes mellitus ([facility] [address] Essential hypertension, H/O cardiovascular stress test, and H/O echocardiogram. and presents for management of  CAD, PTCA [place] 4445,9358, DM, basal cell cancer which are well controlled      Current Outpatient Medications   Medication Sig Dispense Refill    fenofibrate (TRICOR) 145 MG tablet Take 145 mg by mouth daily      budesonide (ENTOCORT EC) 3 MG extended release capsule Take 3 mg by mouth daily      metFORMIN (GLUCOPHAGE-XR) 500 MG extended release tablet Take 500 mg by mouth 2 times daily (with meals)       b complex vitamins capsule Take 1 capsule by mouth daily      ferrous sulfate 325 (65 Fe) MG tablet Take 325 mg by mouth daily (with breakfast)      Cyanocobalamin (VITAMIN B 12 PO) Take by mouth every other day       pregabalin (LYRICA) 75 MG capsule Take 75 mg by mouth 3 times daily.  aspirin 81 MG tablet Take 81 mg by mouth daily       atorvastatin (LIPITOR) 10 MG tablet Take 10 mg by mouth daily.  carvedilol (COREG) 6.25 MG tablet Take 6.25 mg by mouth 2 times daily (with meals)       No current facility-administered medications for this visit. Allergies: Patient has no known allergies. Past Medical History:   Diagnosis Date    Acute MI [facility])     1993 - angioplasty, no stent, Dr. González Bran CAD (coronary artery disease)     Diabetes mellitus ([facility] [address]     Essential hypertension 8/18/2020    H/O cardiovascular stress test 04/09/2019    Normal study.  H/O echocardiogram 04/18/2019    EF 55-60%, Normal study.      Past Surgical History:   Procedure Laterality Date    BALLOON ANGIOPLASTY, ARTERY  02/1994    CARDIAC CATHETERIZATION      CATARACT REMOVAL      TOE SURGERY       Family History   Problem Relation Age of Onset    Heart Attack Father     Heart Disease Father      Social History     Tobacco Use    Smoking status: Former Smoker    Smokeless tobacco: Never Used   Substance Use Topics    Alcohol use: No      [unfilled]  Review of Systems:   · Constitutional: No Fever or Weight Loss   · Eyes: No Decreased Vision  · ENT: No Headaches, Hearing Loss or Vertigo  · Cardiovascular: No chest pain, dyspnea on exertion, palpitations or loss of consciousness  · Respiratory: No cough or wheezing    · Gastrointestinal: No abdominal pain, appetite loss, blood in stools, constipation, diarrhea or heartburn  · Genitourinary: No dysuria, trouble voiding, or hematuria  · Musculoskeletal:  No gait disturbance, weakness or joint complaints  · Integumentary: No rash or pruritis  · Neurological: No TIA or stroke symptoms  · Psychiatric: No anxiety or depression  · Endocrine: No malaise, fatigue or temperature intolerance  · Hematologic/Lymphatic: No bleeding problems, blood clots or swollen lymph nodes  · Allergic/Immunologic: No nasal congestion or hives  All systems negative except as marked. Objective:  /74   Pulse 60   Ht 5' 8\" (1.727 m)   Wt 160 lb (72.6 kg)   BMI 24.33 kg/m²   Wt Readings from Last 3 Encounters:   12/03/20 160 lb (72.6 kg)   05/15/20 165 lb (74.8 kg)   11/08/19 165 lb (74.8 kg)     Body mass index is 24.33 kg/m². GENERAL - Alert, oriented, pleasant, in no apparent distress,normal grooming  HEENT - pupils are reactive to light and accomodation, cornea intact, conjunctive normal color, ears are normal in exam,throat exam in normal, teeth, gum and palate are normal, oral mucosa is normal without any notation of pallor or cyanosis  Neck - Supple. No jugular venous distention noted.  No carotid bruits, no apical -carotid delay  Respiratory:  Normal breath sounds, No respiratory distress, No wheezing, No chest tenderness. ,no use of accessory muscles, diaphragm movement is normal  Cardiovascular: (PMI) apex non displaced,no lifts no thrills, no s3,no s4, Normal heart rate, Normal rhythm, No murmurs, No rubs, No gallops. Carotid arteries pulse and amplitude are normal no bruit, no abdominal bruit noted ( normal abdominal aorta ausculation), femoral arteries pulse and amplitude are normal no bruit, pedal pulses are normal  Femoral pulses have normal amplitude, no bruits   Extremities - No cyanosis, clubbing, or significant edema, no varicose veins    Abdomen - No masses, tenderness, or organomegaly, no hepato-splenomegally, no bruits  Musculoskeletal - No significant edema, no kyphosis or scoliosis, no deformity in any extremity noted, muscle strength and tone are normal  Skin: no ulcer,no scar,no stasis dermatitis   Neurologic - alert oriented times 3,Cranial nerves II through XII are grossly intact. There were no gross focal neurologic abnormalities. All sensory and motor nerves examined and were normal  Psychiatric: normal mood and affect    No results found for: CKTOTAL, CKMB, CKMBINDEX, TROPONINI  BNP:  No results found for: BNP  PT/INR:  No results found for: PTINR  Lab Results   Component Value Date    LABA1C 6.6 (H) 03/17/2014    LABA1C 6.8 (H) 12/14/2013     Lab Results   Component Value Date    CHOL 110 03/17/2014    TRIG 73 03/17/2014    HDL 35 (L) 03/17/2014    LDLDIRECT 61 03/17/2014     Lab Results   Component Value Date    ALT 12 05/15/2018    AST 16 05/15/2018     TSH:  No results found for: TSH    Impression:  Cristian Beatty is a 70 y. o.year old who  has a past medical history of Acute MI ([facility] [address] Arthritis, CAD (coronary artery disease), Diabetes mellitus ([facility] [address] Essential hypertension, H/O cardiovascular stress test, and H/O echocardiogram. and presents with     Plan:  1. CAD: normal stress test and echo, Continue aspirin,

## 2020-12-07 ENCOUNTER — OFFICE VISIT (OUTPATIENT)
Dept: ONCOLOGY | Age: 75
End: 2020-12-07
Payer: MEDICARE

## 2020-12-07 ENCOUNTER — HOSPITAL ENCOUNTER (OUTPATIENT)
Dept: INFUSION THERAPY | Age: 75
Discharge: HOME | End: 2020-12-07
Payer: MEDICARE

## 2020-12-07 VITALS
WEIGHT: 161.6 LBS | HEART RATE: 56 BPM | DIASTOLIC BLOOD PRESSURE: 57 MMHG | BODY MASS INDEX: 24.49 KG/M2 | TEMPERATURE: 97.5 F | OXYGEN SATURATION: 99 % | HEIGHT: 68 IN | SYSTOLIC BLOOD PRESSURE: 150 MMHG | RESPIRATION RATE: 16 BRPM

## 2020-12-07 PROCEDURE — G8420 CALC BMI NORM PARAMETERS: HCPCS | Performed by: INTERNAL MEDICINE

## 2020-12-07 PROCEDURE — G8427 DOCREV CUR MEDS BY ELIG CLIN: HCPCS | Performed by: INTERNAL MEDICINE

## 2020-12-07 PROCEDURE — 1123F ACP DISCUSS/DSCN MKR DOCD: CPT | Performed by: INTERNAL MEDICINE

## 2020-12-07 PROCEDURE — 1036F TOBACCO NON-USER: CPT | Performed by: INTERNAL MEDICINE

## 2020-12-07 PROCEDURE — G8484 FLU IMMUNIZE NO ADMIN: HCPCS | Performed by: INTERNAL MEDICINE

## 2020-12-07 PROCEDURE — 3017F COLORECTAL CA SCREEN DOC REV: CPT | Performed by: INTERNAL MEDICINE

## 2020-12-07 PROCEDURE — 4040F PNEUMOC VAC/ADMIN/RCVD: CPT | Performed by: INTERNAL MEDICINE

## 2020-12-07 PROCEDURE — 99212 OFFICE O/P EST SF 10 MIN: CPT | Performed by: INTERNAL MEDICINE

## 2020-12-07 PROCEDURE — 99211 OFF/OP EST MAY X REQ PHY/QHP: CPT

## 2020-12-07 NOTE — PROGRESS NOTES
MA Rooming Questions  Patient: West Moraes  MRN: [id number]    Date: 12/7/2020        1. Do you have any new issues?   no         2. Do you need any refills on medications?    no    3. Have you had any imaging done since your last visit?   no    4. Have you been hospitalized or seen in the emergency room since your last visit here?   no    5. Did the patient have a depression screening completed today?  No    No data recorded     PHQ-9 Given to (if applicable):               PHQ-9 Score (if applicable):                     [] Positive     []  Negative              Does question #9 need addressed (if applicable)                     [] Yes    []  No               Arianne Meadows MA

## 2020-12-21 ENCOUNTER — PROCEDURE VISIT (OUTPATIENT)
Dept: CARDIOLOGY CLINIC | Age: 75
End: 2020-12-21
Payer: MEDICARE

## 2020-12-21 LAB
LV EF: 58 %
LVEF MODALITY: NORMAL

## 2020-12-21 PROCEDURE — 93306 TTE W/DOPPLER COMPLETE: CPT | Performed by: INTERNAL MEDICINE

## 2020-12-22 ENCOUNTER — TELEPHONE (OUTPATIENT)
Dept: CARDIOLOGY CLINIC | Age: 75
End: 2020-12-22

## 2021-05-10 NOTE — PROGRESS NOTES
Patient Name: Lloyd Connors  Patient : 1945  Patient MRN: [id number]     Primary Oncologist: Kavya Ruth MD  Referring Provider: Valeria King MD     Date of Service: 2021      Chief Complaint:   No chief complaint on file. He came in for follow-up visit. Patient Active Problem List:     Cellulitis     Edema     CAD (coronary artery disease)     Diabetes mellitus ([facility] [address]     Right inguinal hernia     Anemia, unspecified     Iron deficiency anemia secondary to blood loss (chronic)     Anemia of chronic disorder     Dyslipidemia     Essential hypertension     HPI:   Franny Lozano is a pleasant 68-year-old male patient whom I have been following for chronic anemia since 2009. He had GI workup, including colonoscopy, EGD, capsule endoscopic study in  by Dr. Zenia Marion. He was found to have diverticulosis. Bone marrow study in 2009 showed trilineage hematopoietic marrow, which appeared normocellular for his age. Iron stains were adequate. FISH for MDS was negative. Flow cytometry showed no monoclonality. He was found to have mild leukopenia. He had surgery on his right toe in 2013 and was hospitalized in 2013 with cellulitis to right lower extremity. He was treated with antibiotic. MRI of the right lower extremity at the time showed findings compatible with cellulitis. Blood test in 2014 showed white cell count of 5.1, hemoglobin 11.3, platelet 608, iron was 96, TIBC 369, transferrin saturation 26, ferritin 105. He had stool guaiac by Dr. Gene Keenan in May 2014. He had umbilical hernia surgery in 2014 by Dr. Nic Acosta. Blood test in 2014 showed white cell count 4.8, hemoglobin 11.4, platelet 840. Iron was 95, ferritin 102, transferrin saturation 31. Thyroid functions were within normal limits. He had colonoscopy in March or 2015. Reportedly, he could have slight inflammation to his colon.   He had his stool checked for C. diff, which was negative. Blood test in August 2015 showed white cell count 4.9, hemoglobin 10.9, platelet 218. Ferritin was 92. Blood test on February 10, 2016, revealed white cell count 4.5, hemoglobin 10.7, hematocrit 33.5, platelet 820, ferritin 96, stable. He stopped taking ASA for three months. Stool guaiac times three in February 2016 were negative. He restarted iron pill on August 8, 2016. Blood test on August 8, 2016, revealed white cell count 4.7, hemoglobin 10.2, platelet 920. Ferritin was 73. From my point of view, he can take his iron pill twice a day. Blood test improved on February 3, 2017, with hemoglobin 11.4. White cell was 4.3, platelet 132. Ferritin was 89, B12 218, with normal methylmalonic acid. I recommended he also take B12 supplements orally. Blood test result was reviewed and he was recommended to take B12 supplements 1,000 mcg a day over the counter. He has been taking this since August 2017. Blood tests in November 2017:  intrinsic factor antibody was negative. Methylmalonic acid, B12 were within normal limits. TSH was within normal limits. CBC   He is a . Blood tests in May 2019 reviewed. Hg dropped slightly. ECHO and stress test in May 96268 were OK. Labs in November 2019 were stable and reviewed. I will defer bone marrow study. He takes B-complex, B-12 pill and Iron pill. He takes 81 mg ASA. Blood test in May 2020 was stable to him. CBC on November 13, 2020 showed WBC 5.7, hemoglobin 12.2, platelet 435. On June 7, 2021 he came in for follow up visit. He had the Covid vaccine. He had colonoscopy in 2020 and reportedly there was no polyp. He also had capsule endoscopic study. Recent labs in May 2021 were reviewed. Ferritin was 93, WBC 6.0, hemoglobin 10.7, MCV 94.2, platelet 194. He is agreeable to continue with surveillance. He takes iron pill once a day and also baby aspirin.   He denied any hematuria, melena or hematochezia. No acute pain, no depression. He denied any chest pain, shortness of breath or productive cough. No fever or chills. No loss of smell or taste. He denies any nausea, vomiting or diarrhea. PAST MEDICAL HISTORY:  Heart disease, chronic arthritis, diabetes. Chronic anemia. He had surgery on his nose in February 2020. FAMILY HISTORY  No blood disorder in the family. No history of malignancy. SOCIAL HISTORY:   He quit smoking in 1993. No history of alcohol abuse. Denied illicit drug use. LABORATORY STUDIES:   Labs in August 2012 showed white cell count 4.4, hemoglobin 11.4, hematocrit 32.1, platelet 760,471. Ferritin 63, iron 69, TIBC 362, transferrin saturation 19 percent. May 2013:  Ferritin 56, iron 68, iron-binding 361, transferrin saturation 19 percent, B12 324. White cell remains stable at 4.2, hemoglobin 11.8, hematocrit 34.5, platelet 194. Review of Systems: \"Per interval history; otherwise 10 point ROS is negative. \"     Vital Signs: There were no vitals taken for this visit. Physical Exam:  CONSTITUTIONAL: awake, alert, cooperative, no apparent distress   EYES: pupils equal, round and reactive to light, sclera clear and pale conjunctiva   ENT: Normocephalic, without obvious abnormality, atraumatic  NECK: supple, symmetrical, no jugular venous distension and no carotid bruits   HEMATOLOGIC/LYMPHATIC: no cervical, supraclavicular or axillary lymphadenopathy   LUNGS: no increased work of breathing and clear to auscultation   CARDIOVASCULAR: regular rate and rhythm, normal S1 and S2, no murmur noted  ABDOMEN: normal bowel sounds, soft, non-distended, non-tender, no masses palpated, no hepatosplenomegaly   MUSCULOSKELETAL: full range of motion noted, tone is normal  NEUROLOGIC: awake, alert, oriented to name, place and time. Motor skills grossly intact. Cranial nerves II through XII are grossly intact  SKIN: Normal skin color, texture, turgor and no jaundice. appears intact   EXTREMITIES: no LE edema. No cyanosis. Labs:  Hematology:  Lab Results   Component Value Date    WBC 5.7 11/30/2020    RBC 3.96 (L) 11/30/2020    HGB 12.2 (L) 11/30/2020    HCT 37.4 (L) 11/30/2020    MCV 94.4 11/30/2020    MCH 30.8 11/30/2020    MCHC 32.6 11/30/2020    RDW 14.0 11/30/2020     11/30/2020    MPV 9.4 11/30/2020    SEGSPCT 63.1 11/30/2020    EOSRELPCT 1.9 11/30/2020    BASOPCT 0.5 11/30/2020    LYMPHOPCT 24.6 11/30/2020    MONOPCT 9.9 (H) 11/30/2020    SEGSABS 3.6 11/30/2020    EOSABS 0.1 11/30/2020    BASOSABS 0.0 11/30/2020    LYMPHSABS 1.4 11/30/2020    MONOSABS 0.6 11/30/2020    DIFFTYPE AUTOMATED DIFFERENTIAL 11/30/2020     Lab Results   Component Value Date    ESR 26 (H) 12/20/2013     Chemistry:  Lab Results   Component Value Date     05/15/2018    K 4.4 05/15/2018     05/15/2018    CO2 29 05/15/2018    BUN 19 05/15/2018    CREATININE 1.0 05/15/2018    GLUCOSE 110 (H) 05/15/2018    CALCIUM 9.4 05/15/2018    PROT 6.5 05/15/2018    LABALBU 4.4 05/15/2018    BILITOT 0.3 05/15/2018    ALKPHOS 39 (L) 05/15/2018    AST 16 05/15/2018    ALT 12 05/15/2018    LABGLOM >60 05/15/2018    GFRAA >60 05/15/2018    POCGLU 139 (H) 12/25/2013     Lab Results   Component Value Date    MMA 0.25 11/09/2017     Lab Results   Component Value Date    TSHHS 0.961 11/09/2017    T4FREE 1.15 11/12/2014     Immunology:  Lab Results   Component Value Date    PROT 6.5 05/15/2018     Coagulation Panel:  Lab Results   Component Value Date    PROTIME 11.8 12/20/2013    INR 1.09 12/20/2013    APTT 24.5 12/20/2013     Anemia Panel:  Lab Results   Component Value Date    CPXDAODK65 522.2 11/30/2020    FOLATE >20.0 (H) 05/15/2018     Tumor Markers:  Lab Results   Component Value Date    PSA 0.67 12/14/2013        Observations:  No data recorded      Assessment & Plan:    1. He has mild anemia. He could have a low-grade myelodysplasia and iron deficiency.    He had colonoscopy in March or April 2015 by Dr. Tere Ramírez. B-12, methylmalonic acid were within normal limits. Blood test in May was reviewed. Hg dropped slightly. Labs in November 2019 were reviewed and stable. Blood test in May 2020 was stable to him. CBC in November 2020 was stable. Labs in May 2021 were reviewed and stable. He is agreeable to continue with close surveillance. 2. I advised him to keep his age-appropriate cancer screening up to date. He had colonoscopy in 2020 which reportedly was negative for polyp. We discussed about healthy diet and lifestyle. Simone Schmid He had both Covid vaccine    RTC in six months or sooner. All of his questions have been answered for today. Recent imaging and labs were reviewed and discussed with the patient.       Electronically signed by Armida Sorensen MD on 12/2/20 at 8:45 AM EST

## 2021-05-28 ENCOUNTER — HOSPITAL ENCOUNTER (OUTPATIENT)
Dept: INFUSION THERAPY | Age: 76
Discharge: HOME | End: 2021-05-28
Payer: MEDICARE

## 2021-05-28 DIAGNOSIS — D64.9 ANEMIA, UNSPECIFIED TYPE: ICD-10-CM

## 2021-05-28 LAB
BASOPHILS ABSOLUTE: 0 K/CU MM
BASOPHILS RELATIVE PERCENT: 0.5 % (ref 0–1)
DIFFERENTIAL TYPE: ABNORMAL
EOSINOPHILS ABSOLUTE: 0.1 K/CU MM
EOSINOPHILS RELATIVE PERCENT: 1.7 % (ref 0–3)
FERRITIN: 93 NG/ML (ref 30–400)
HCT VFR BLD CALC: 32.3 % (ref 42–52)
HEMOGLOBIN: 10.7 GM/DL (ref 13.5–18)
IRON: 70 UG/DL (ref 59–158)
LYMPHOCYTES ABSOLUTE: 1.5 K/CU MM
LYMPHOCYTES RELATIVE PERCENT: 25 % (ref 24–44)
MCH RBC QN AUTO: 31.2 PG (ref 27–31)
MCHC RBC AUTO-ENTMCNC: 33.1 % (ref 32–36)
MCV RBC AUTO: 94.2 FL (ref 78–100)
MONOCYTES ABSOLUTE: 0.5 K/CU MM
MONOCYTES RELATIVE PERCENT: 8.8 % (ref 0–4)
PCT TRANSFERRIN: 21 % (ref 10–44)
PDW BLD-RTO: 13.7 % (ref 11.7–14.9)
PLATELET # BLD: 215 K/CU MM (ref 140–440)
PMV BLD AUTO: 9.4 FL (ref 7.5–11.1)
RBC # BLD: 3.43 M/CU MM (ref 4.6–6.2)
SEGMENTED NEUTROPHILS ABSOLUTE COUNT: 3.9 K/CU MM
SEGMENTED NEUTROPHILS RELATIVE PERCENT: 64 % (ref 36–66)
TOTAL IRON BINDING CAPACITY: 328 UG/DL (ref 250–450)
UNSATURATED IRON BINDING CAPACITY: 258 UG/DL (ref 110–370)
WBC # BLD: 6 K/CU MM (ref 4–10.5)

## 2021-05-28 PROCEDURE — 82728 ASSAY OF FERRITIN: CPT

## 2021-05-28 PROCEDURE — 36415 COLL VENOUS BLD VENIPUNCTURE: CPT

## 2021-05-28 PROCEDURE — 85025 COMPLETE CBC W/AUTO DIFF WBC: CPT

## 2021-05-28 PROCEDURE — 83540 ASSAY OF IRON: CPT

## 2021-05-28 PROCEDURE — 83550 IRON BINDING TEST: CPT

## 2021-06-07 ENCOUNTER — HOSPITAL ENCOUNTER (OUTPATIENT)
Dept: INFUSION THERAPY | Age: 76
Discharge: HOME | End: 2021-06-07
Payer: MEDICARE

## 2021-06-07 ENCOUNTER — OFFICE VISIT (OUTPATIENT)
Dept: ONCOLOGY | Age: 76
End: 2021-06-07
Payer: MEDICARE

## 2021-06-07 VITALS
WEIGHT: 161 LBS | SYSTOLIC BLOOD PRESSURE: 134 MMHG | RESPIRATION RATE: 16 BRPM | OXYGEN SATURATION: 99 % | TEMPERATURE: 98.3 F | BODY MASS INDEX: 24.4 KG/M2 | DIASTOLIC BLOOD PRESSURE: 73 MMHG | HEIGHT: 68 IN | HEART RATE: 72 BPM

## 2021-06-07 DIAGNOSIS — D64.9 ANEMIA, UNSPECIFIED TYPE: Primary | ICD-10-CM

## 2021-06-07 PROCEDURE — 99211 OFF/OP EST MAY X REQ PHY/QHP: CPT

## 2021-06-07 PROCEDURE — G8420 CALC BMI NORM PARAMETERS: HCPCS | Performed by: INTERNAL MEDICINE

## 2021-06-07 PROCEDURE — 4040F PNEUMOC VAC/ADMIN/RCVD: CPT | Performed by: INTERNAL MEDICINE

## 2021-06-07 PROCEDURE — 99213 OFFICE O/P EST LOW 20 MIN: CPT | Performed by: INTERNAL MEDICINE

## 2021-06-07 PROCEDURE — 1123F ACP DISCUSS/DSCN MKR DOCD: CPT | Performed by: INTERNAL MEDICINE

## 2021-06-07 PROCEDURE — 3017F COLORECTAL CA SCREEN DOC REV: CPT | Performed by: INTERNAL MEDICINE

## 2021-06-07 PROCEDURE — 1036F TOBACCO NON-USER: CPT | Performed by: INTERNAL MEDICINE

## 2021-06-07 PROCEDURE — G8427 DOCREV CUR MEDS BY ELIG CLIN: HCPCS | Performed by: INTERNAL MEDICINE

## 2021-06-07 NOTE — PROGRESS NOTES
MA Rooming Questions  Patient: Memo Tobias  MRN: [id number]    Date: 6/7/2021        1. Do you have any new issues?   no         2. Do you need any refills on medications?    no    3. Have you had any imaging done since your last visit?   no    4. Have you been hospitalized or seen in the emergency room since your last visit here?   no    5. Did the patient have a depression screening completed today?  No    No data recorded     PHQ-9 Given to (if applicable):               PHQ-9 Score (if applicable):                     [] Positive     []  Negative              Does question #9 need addressed (if applicable)                     [] Yes    []  No               Verena Curling, CMA

## 2021-06-08 ENCOUNTER — OFFICE VISIT (OUTPATIENT)
Dept: CARDIOLOGY CLINIC | Age: 76
End: 2021-06-08
Payer: MEDICARE

## 2021-06-08 VITALS
DIASTOLIC BLOOD PRESSURE: 60 MMHG | SYSTOLIC BLOOD PRESSURE: 158 MMHG | HEART RATE: 58 BPM | BODY MASS INDEX: 24.83 KG/M2 | HEIGHT: 68 IN | WEIGHT: 163.8 LBS

## 2021-06-08 DIAGNOSIS — I10 ESSENTIAL HYPERTENSION: Primary | ICD-10-CM

## 2021-06-08 PROCEDURE — 99214 OFFICE O/P EST MOD 30 MIN: CPT | Performed by: INTERNAL MEDICINE

## 2021-06-08 PROCEDURE — 1036F TOBACCO NON-USER: CPT | Performed by: INTERNAL MEDICINE

## 2021-06-08 PROCEDURE — 1123F ACP DISCUSS/DSCN MKR DOCD: CPT | Performed by: INTERNAL MEDICINE

## 2021-06-08 PROCEDURE — 4040F PNEUMOC VAC/ADMIN/RCVD: CPT | Performed by: INTERNAL MEDICINE

## 2021-06-08 PROCEDURE — 3017F COLORECTAL CA SCREEN DOC REV: CPT | Performed by: INTERNAL MEDICINE

## 2021-06-08 PROCEDURE — G8420 CALC BMI NORM PARAMETERS: HCPCS | Performed by: INTERNAL MEDICINE

## 2021-06-08 PROCEDURE — G8427 DOCREV CUR MEDS BY ELIG CLIN: HCPCS | Performed by: INTERNAL MEDICINE

## 2021-06-08 RX ORDER — LISINOPRIL 2.5 MG/1
2.5 TABLET ORAL DAILY
Qty: 30 TABLET | Refills: 3 | Status: SHIPPED | OUTPATIENT
Start: 2021-06-08 | End: 2021-10-26 | Stop reason: SDUPTHER

## 2021-06-08 NOTE — PROGRESS NOTES
Nicolas Sesay MD        OFFICE  FOLLOWUP NOTE    Chief complaints: patient is here for management of [address], [address] [place] [address], DM, basal cell cancer  Subjective: patient feels better, no chest pain, no shortness of breath, no dizziness, no palpitations    HPI Rakesh Ahumada is a 70 y. o.year old who  has a past medical history of Acute MI ([facility] [address] Arthritis, CAD (coronary artery disease), Diabetes mellitus ([facility] [address] Essential hypertension, H/O cardiovascular stress test, H/O echocardiogram, and H/O echocardiogram. and presents for management of  CAD, PTCA [place] 6291,0894, DM, basal cell cancer which are well controlled      Current Outpatient Medications   Medication Sig Dispense Refill    fenofibrate (TRICOR) 145 MG tablet Take 145 mg by mouth daily      budesonide (ENTOCORT EC) 3 MG extended release capsule Take 3 mg by mouth daily      metFORMIN (GLUCOPHAGE-XR) 500 MG extended release tablet Take 500 mg by mouth 2 times daily (with meals)       b complex vitamins capsule Take 1 capsule by mouth daily      ferrous sulfate 325 (65 Fe) MG tablet Take 325 mg by mouth daily (with breakfast)      Cyanocobalamin (VITAMIN B 12 PO) Take by mouth every other day       carvedilol (COREG) 6.25 MG tablet Take 6.25 mg by mouth 2 times daily (with meals)      pregabalin (LYRICA) 75 MG capsule Take 75 mg by mouth 3 times daily.  aspirin 81 MG tablet Take 81 mg by mouth daily       atorvastatin (LIPITOR) 10 MG tablet Take 10 mg by mouth daily. No current facility-administered medications for this visit. Allergies: Patient has no known allergies. Past Medical History:   Diagnosis Date    Acute MI [facility])     1993 - angioplasty, no stent, Dr. Ayleen Laurent CAD (coronary artery disease)     [facility] mellitus ([email or web address]     Essential hypertension 08/18/2020    H/O cardiovascular stress test 04/09/2019    Normal study.  H/O echocardiogram 04/18/2019    EF 55-60%, Normal study.     H/O echocardiogram 12/21/2020    EF 55-60%, Mild MR & LVH. Past Surgical History:   Procedure Laterality Date    Daryle Haymaker, ARTERY  02/1994    CARDIAC CATHETERIZATION      CATARACT REMOVAL      TOE SURGERY       Family History   Problem Relation Age of Onset    Heart Attack Father     Heart Disease Father      Social History     Tobacco Use    Smoking status: Former Smoker    Smokeless tobacco: Never Used   Substance Use Topics    Alcohol use: No      [unfilled]  Review of Systems:   · Constitutional: No Fever or Weight Loss   · Eyes: No Decreased Vision  · ENT: No Headaches, Hearing Loss or Vertigo  · Cardiovascular: No chest pain, dyspnea on exertion, palpitations or loss of consciousness  · Respiratory: No cough or wheezing    · Gastrointestinal: No abdominal pain, appetite loss, blood in stools, constipation, diarrhea or heartburn  · Genitourinary: No dysuria, trouble voiding, or hematuria  · Musculoskeletal:  No gait disturbance, weakness or joint complaints  · Integumentary: No rash or pruritis  · Neurological: No TIA or stroke symptoms  · Psychiatric: No anxiety or depression  · Endocrine: No malaise, fatigue or temperature intolerance  · Hematologic/Lymphatic: No bleeding problems, blood clots or swollen lymph nodes  · Allergic/Immunologic: No nasal congestion or hives  All systems negative except as marked. Objective:  BP (!) 158/60   Pulse 58   Ht 5' 8\" (1.727 m)   Wt 163 lb 12.8 oz (74.3 kg)   BMI 24.91 kg/m²   Wt Readings from Last 3 Encounters:   06/08/21 163 lb 12.8 oz (74.3 kg)   06/07/21 161 lb (73 kg)   12/07/20 161 lb 9.6 oz (73.3 kg)     Body mass index is 24.91 kg/m². GENERAL - Alert, oriented, pleasant, in no apparent distress,normal grooming  HEENT - pupils are intact, cornea intact, conjunctive normal color, ears are normal in exam,  Neck - Supple. No jugular venous distention noted.  No carotid bruits, no apical -carotid delay  Respiratory:  Normal breath sounds, No respiratory distress, No wheezing, No chest tenderness. ,no use of accessory muscles, diaphragm movement is normal  Cardiovascular: (PMI) apex non displaced,no lifts no thrills, no s3,no s4, Normal heart rate, Normal rhythm, No murmurs, No rubs, No gallops. Carotid arteries pulse and amplitude are normal no bruit, no abdominal bruit noted ( normal abdominal aorta ausculation),   Extremities - No cyanosis, clubbing, or significant edema, no varicose veins    Abdomen - No masses, tenderness, or organomegaly, no hepato-splenomegally, no bruits  Musculoskeletal - No significant edema, no kyphosis or scoliosis, no deformity in any extremity noted, muscle strength and tone are normal  Skin: no ulcer,no scar,no stasis dermatitis   Neurologic - alert oriented times 3,Cranial nerves II through XII are grossly intact. There were no gross focal neurologic abnormalities. Psychiatric: normal mood and affect    No results found for: CKTOTAL, CKMB, CKMBINDEX, TROPONINI  BNP:  No results found for: BNP  PT/INR:  No results found for: PTINR  Lab Results   Component Value Date    LABA1C 6.6 (H) 03/17/2014    LABA1C 6.8 (H) 12/14/2013     Lab Results   Component Value Date    CHOL 110 03/17/2014    TRIG 73 03/17/2014    HDL 35 (L) 03/17/2014    LDLDIRECT 61 03/17/2014     Lab Results   Component Value Date    ALT 12 05/15/2018    AST 16 05/15/2018     TSH:  No results found for: TSH    Impression:  Brenna Grandchild is a 70 y. o.year old who  has a past medical history of Acute MI ([facility] [address] Arthritis, CAD (coronary artery disease), Diabetes mellitus ([facility] [address] Essential hypertension, H/O cardiovascular stress test, H/O echocardiogram, and H/O echocardiogram. and presents with     Plan:  1. CAD: normal stress test and echo, Continue aspirin, continue statins,s, betablockers,  2. DM:controlled, recommend to keep hA1C around 6.8, recommend to continue metformin  3. AAA\" screening: abdominal ultrasound ordered  4.  Facial basal cell cancer: s/p biopsy  5. Dyslipidemia: continue statins  HTN: unstable, continue coreg, he takes 1/2 table in morning and 1/2 at night, add lisinopril 2.5mg daily amd check chem 7  In 1 week, Avoid red meat, processed meat, and salt,start exercise, lose weight, increase fresh fruits, green vegetable and nuts  All labs, medications and tests reviewed, continue all other medications of all above medical condition listed as is.     [unfilled]

## 2021-06-08 NOTE — LETTER
[address] [facility]  1945  [id number]    Have you had any Chest Pain that is not new? - No       DO EKG IF: Patient has a Heart Rate above 100 or below 40     CAD (Coronary Artery Disease) patient should have one on file every 6 months        Have you had any Shortness of Breath - No      Have you had any dizziness - No     Sitting wait 5 minutes do supine (laying down) wait 5 minutes then do standing - log each in \"vitals\" area in Epic   Be sure to ask what symptoms they are having if they get dizzy while completing ortho stats such as: room spinning, nausea, etc.    Have you had any palpitations that are not new? - No      Do you have any edema - swelling in No      When did you have your last labs drawn   Where did you have them done   What doctor ordered     If we do not have these labs you are retrieve these labs for these providers!     Do you have a surgery or procedure scheduled in the near future - No

## 2021-06-08 NOTE — PATIENT INSTRUCTIONS
**It is YOUR responsibilty to bring medication bottles and/or updated medication list to [address] [place] [address]. This will allow us to better serve you and all your healthcare needs**      Please be informed that if you contact our office outside of normal business hours the physician on call cannot help with any scheduling or rescheduling issues, procedure instruction questions or any type of medication issue. We advise you for any urgent/emergency that you go to the nearest emergency room! PLEASE CALL OUR OFFICE DURING NORMAL BUSINESS HOURS    Monday - Friday   8 am to 5 pm    [facility] [place] [address]: [phone]    [place]:  [phone]    Please hold on to these instructions the  will call you within 1-9 business days when we receive authorization from your insurance. ABDOMINAL AORTA ULTRASOUND    WHAT TO EXPECT:   ? This test will take approximately 45 minutes. ? It is an ultrasound of the abdominal area. ? It can detect an abdominal aorta aneurysm if present. PREPARATION FOR TEST:  ? MUST NOT HAVE ANYTHING TO EAT 8 HOURS PRIOR TO THE EXAM.  ? YOU CAN HAVE WATER AND TAKE YOUR MEDICATION. ? Fat free meal the evening prior to the exam.     If you are unable to keep this appointment, please notify us 24 hours prior to test at [phone].

## 2021-06-11 DIAGNOSIS — I10 ESSENTIAL HYPERTENSION: ICD-10-CM

## 2021-06-11 LAB
ANION GAP SERPL CALCULATED.3IONS-SCNC: 14 MMOL/L (ref 3–16)
BUN BLDV-MCNC: 18 MG/DL (ref 7–20)
CALCIUM SERPL-MCNC: 9.2 MG/DL (ref 8.3–10.6)
CHLORIDE BLD-SCNC: 104 MMOL/L (ref 99–110)
CO2: 26 MMOL/L (ref 21–32)
CREAT SERPL-MCNC: 1.1 MG/DL (ref 0.8–1.3)
GFR AFRICAN AMERICAN: >60
GFR NON-AFRICAN AMERICAN: >60
GLUCOSE BLD-MCNC: 219 MG/DL (ref 70–99)
POTASSIUM SERPL-SCNC: 3.8 MMOL/L (ref 3.5–5.1)
SODIUM BLD-SCNC: 144 MMOL/L (ref 136–145)

## 2021-07-06 ENCOUNTER — PROCEDURE VISIT (OUTPATIENT)
Dept: CARDIOLOGY CLINIC | Age: 76
End: 2021-07-06
Payer: MEDICARE

## 2021-07-06 DIAGNOSIS — I10 ESSENTIAL HYPERTENSION: ICD-10-CM

## 2021-07-06 PROCEDURE — 93979 VASCULAR STUDY: CPT | Performed by: INTERNAL MEDICINE

## 2021-07-07 ENCOUNTER — OFFICE VISIT (OUTPATIENT)
Dept: CARDIOLOGY CLINIC | Age: 76
End: 2021-07-07
Payer: MEDICARE

## 2021-07-07 VITALS
DIASTOLIC BLOOD PRESSURE: 66 MMHG | SYSTOLIC BLOOD PRESSURE: 120 MMHG | BODY MASS INDEX: 24.64 KG/M2 | OXYGEN SATURATION: 99 % | HEIGHT: 68 IN | HEART RATE: 65 BPM | WEIGHT: 162.6 LBS

## 2021-07-07 DIAGNOSIS — I10 ESSENTIAL HYPERTENSION: ICD-10-CM

## 2021-07-07 DIAGNOSIS — E78.5 DYSLIPIDEMIA: ICD-10-CM

## 2021-07-07 DIAGNOSIS — I25.10 CORONARY ARTERY DISEASE INVOLVING NATIVE CORONARY ARTERY OF NATIVE HEART WITHOUT ANGINA PECTORIS: Primary | ICD-10-CM

## 2021-07-07 DIAGNOSIS — E11.9 TYPE 2 DIABETES MELLITUS WITHOUT COMPLICATION, WITHOUT LONG-TERM CURRENT USE OF INSULIN (HCC): ICD-10-CM

## 2021-07-07 PROCEDURE — G8420 CALC BMI NORM PARAMETERS: HCPCS | Performed by: NURSE PRACTITIONER

## 2021-07-07 PROCEDURE — 3017F COLORECTAL CA SCREEN DOC REV: CPT | Performed by: NURSE PRACTITIONER

## 2021-07-07 PROCEDURE — 1123F ACP DISCUSS/DSCN MKR DOCD: CPT | Performed by: NURSE PRACTITIONER

## 2021-07-07 PROCEDURE — 2022F DILAT RTA XM EVC RTNOPTHY: CPT | Performed by: NURSE PRACTITIONER

## 2021-07-07 PROCEDURE — G8427 DOCREV CUR MEDS BY ELIG CLIN: HCPCS | Performed by: NURSE PRACTITIONER

## 2021-07-07 PROCEDURE — 4040F PNEUMOC VAC/ADMIN/RCVD: CPT | Performed by: NURSE PRACTITIONER

## 2021-07-07 PROCEDURE — 3046F HEMOGLOBIN A1C LEVEL >9.0%: CPT | Performed by: NURSE PRACTITIONER

## 2021-07-07 PROCEDURE — 99214 OFFICE O/P EST MOD 30 MIN: CPT | Performed by: NURSE PRACTITIONER

## 2021-07-07 PROCEDURE — 93000 ELECTROCARDIOGRAM COMPLETE: CPT | Performed by: NURSE PRACTITIONER

## 2021-07-07 PROCEDURE — 1036F TOBACCO NON-USER: CPT | Performed by: NURSE PRACTITIONER

## 2021-07-07 RX ORDER — CARVEDILOL 3.12 MG/1
3.12 TABLET ORAL 2 TIMES DAILY WITH MEALS
Qty: 60 TABLET | Refills: 3
Start: 2021-07-07 | End: 2021-12-06 | Stop reason: SINTOL

## 2021-07-07 NOTE — ASSESSMENT & PLAN NOTE
-Stable, continue with Coreg 3.125 mg twice daily, lisinopril 2.5 mg daily. Patient was recently started on lisinopril 2.5 mg daily, follow-up BMP shows intact renal function and normal potassium.

## 2021-07-07 NOTE — PATIENT INSTRUCTIONS
**It is YOUR responsibilty to bring medication bottles and/or updated medication list to [address] [place] [address]. This will allow us to better serve you and all your healthcare needs**    Please be informed that if you contact our office outside of normal business hours the physician on call cannot help with any scheduling or rescheduling issues, procedure instruction questions or any type of medication issue. We advise you for any urgent/emergency that you go to the nearest emergency room!     PLEASE CALL OUR OFFICE DURING NORMAL BUSINESS HOURS    Monday - Friday   8 am to 5 pm    [place]: [place] [address]: [phone]    [place]:  [phone]

## 2021-07-07 NOTE — PROGRESS NOTES
[facility] ([facility] [place] PHYSICAL [facility]  [address], [address]  Phone: [phone]    Fax [phone]                  Zina Moreau MD, Henri Willis MD, [address], MD, MD Melanie Xavier MD Eusebio Lands, MD Sherman Hart, APRN      Jimmy Munguia, APRN  Jonnathan Pena, [facility], [facility]        Cardiology Progress Note      7/7/2021    RE: Severa App  (1945)                             Primary cardiologist: Dr. Melanie Ruiz       Subjective:  CC:   1. Coronary artery disease involving native coronary artery of native heart without angina pectoris    2. Essential hypertension    3. Dyslipidemia    4. Type 2 diabetes mellitus without complication, without long-term current use of insulin (HCC)        HPI: Severa App, who is a  78y.o. year old male with a past medical history as listed below. Patient presents to the office for follow up on CAD PTCA in 6063,1026, HTN, and hyperlipidemia. Patient is  an active male who walks regularly. Patient is  compliant with medications. Patient denies any chest pain, shortness of breath, dizziness, syncope, or palpitations. Past Medical History:   Diagnosis Date    Acute MI [facility])     1993 - angioplasty, no stent, Dr. Ellie Hatch CAD (coronary artery disease)     [facility]     Essential hypertension 08/18/2020    H/O cardiovascular stress test 04/09/2019    Normal study.  H/O echocardiogram 04/18/2019    EF 55-60%, Normal study.  H/O echocardiogram 12/21/2020    EF 55-60%, Mild MR & LVH.        Current Outpatient Medications   Medication Sig Dispense Refill    carvedilol (COREG) 3.125 MG tablet Take 1 tablet by mouth 2 times daily (with meals) 60 tablet 3    lisinopril (PRINIVIL;ZESTRIL) 2.5 MG tablet Take 1 tablet by mouth daily 30 tablet 3    fenofibrate (TRICOR) 145 MG tablet Take 145 mg by mouth daily      budesonide (ENTOCORT EC) 3 MG extended release capsule Take 3 mg by mouth daily      metFORMIN (GLUCOPHAGE-XR) 500 MG extended release tablet Take 500 mg by mouth 2 times daily (with meals)       b complex vitamins capsule Take 1 capsule by mouth daily      ferrous sulfate 325 (65 Fe) MG tablet Take 325 mg by mouth daily (with breakfast)      Cyanocobalamin (VITAMIN B 12 PO) Take by mouth every other day       pregabalin (LYRICA) 75 MG capsule Take 75 mg by mouth 3 times daily.  aspirin 81 MG tablet Take 81 mg by mouth daily       atorvastatin (LIPITOR) 10 MG tablet Take 10 mg by mouth daily. No current facility-administered medications for this visit. Review of Systems:  Review of Systems   Cardiovascular: Negative for chest pain, palpitations and leg swelling. Musculoskeletal: Negative. Skin: Negative. Neurological: Negative for dizziness and weakness. All other systems reviewed and are negative. Objective:      Physical Exam:  /66   Pulse 65   Ht 5' 8\" (1.727 m)   Wt 162 lb 9.6 oz (73.8 kg)   SpO2 99%   BMI 24.72 kg/m²   Wt Readings from Last 3 Encounters:   07/07/21 162 lb 9.6 oz (73.8 kg)   06/08/21 163 lb 12.8 oz (74.3 kg)   06/07/21 161 lb (73 kg)     Body mass index is 24.72 kg/m². Physical exam:  Physical Exam  Constitutional:       Appearance: He is well-developed. Cardiovascular:      Rate and Rhythm: Normal rate and regular rhythm. Pulses: Intact distal pulses. Dorsalis pedis pulses are 2+ on the right side and 2+ on the left side. Posterior tibial pulses are 2+ on the right side and 2+ on the left side. Heart sounds: Normal heart sounds, S1 normal and S2 normal.   Pulmonary:      Effort: Pulmonary effort is normal.      Breath sounds: Normal breath sounds. Musculoskeletal:         General: Normal range of motion. Skin:     General: Skin is warm and dry. Neurological:      Mental Status: He is alert and oriented to person, place, and time. DATA:  No results found for: CKTOTAL, CKMB, CKMBINDEX, TROPONINI  BNP:  No results found for: BNP  PT/INR:  No results found for: PTINR  Lab Results   Component Value Date    LABA1C 6.6 (H) 03/17/2014    LABA1C 6.8 (H) 12/14/2013     Lab Results   Component Value Date    CHOL 110 03/17/2014    TRIG 73 03/17/2014    HDL 35 (L) 03/17/2014    LDLDIRECT 61 03/17/2014     Lab Results   Component Value Date    ALT 12 05/15/2018    AST 16 05/15/2018     TSH:  No results found for: TSH    Vitals:    07/07/21 1041   BP: 120/66   Pulse: 65   SpO2: 99%       Echo:12/21/20  Left ventricular function is normal, EF is estimated at 55-60%. Mild left ventricular hypertrophy. Normal diastolic filling pattern for age. Mildly dilated left atrium. Mild mitral regurgitation is present. RVSP is 12 mmHg. No evidence of pericardial effusion. Stress Test:4/6/19  No ischemia        The ASCVD Risk score (Maricruz Alexander, et al., 2013) failed to calculate for the following reasons:    Cannot find a previous HDL lab    Cannot find a previous total cholesterol lab      Assessment/ Plan:     CAD (coronary artery disease)   -PTCA in 1993 and 1994. Stress test in 2019 showed no ischemia. Primary and secondary prevention discussed with patient:   -Low sodium cardiac diet   -exercise 30 min a day three days a week  Continue current medications, Coreg, fenofibrate, aspirin, atorvastatin, lisinopril. -AAA screening with abdominal ultrasound yesterday, results not available. Essential hypertension   -Stable, continue with Coreg 3.125 mg twice daily, lisinopril 2.5 mg daily. Patient was recently started on lisinopril 2.5 mg daily, follow-up BMP shows intact renal function and normal potassium.     Dyslipidemia      104         Triglycerides 0 - 149 mg/dL 96        HDL Cholesterol >39 mg/dL 38Low         VLDL Cholesterol Marlo 5 - 40 mg/dL 18        LDL CHOL CALC (NIH) 0 - 99 mg/dL 48      -At or near goal yes    -He is to continue current medications (Lipitor 10 mg) Hepatic function panel WNL. No abdominal pain. No myalgias.     -The nature of cardiac risk has been fully discussed with this patient. I have made him aware of his LDL target goal given his cardiovascular risk analysis. I have discussed the appropriate diet. The need for lifelong compliance in order to reduce risk is stressed. A regular exercise program is recommended to help achieve and maintain normal body weight, fitness and improve lipid balance. A written copy of a low fat, low cholesterol diet has been given to the patient. Diabetes mellitus ([facility]   -Managed by PCP, last A1C 8. Patient seen, interviewed and examined. Testing was reviewed. Patient is encouraged to exercise even a brisk walk for 30 minutes at least 3 to 4 times a week. Lifestyle and risk factor modificatons discussed. Various goals are discussed and questions answered. Continue current medications. Appropriate prescriptions are addressed. Questions answered and patient verbalizes understanding. Call for any problems, questions, or concerns. Pt is to follow up in 6 months for Cardiac management    Electronically signed by Destinee Blood.  ANNABELLA Myers CNP on 7/7/2021 at 11:16 AM

## 2021-07-07 NOTE — ASSESSMENT & PLAN NOTE
-PTCA in [address] Stress test in 2019 showed no ischemia. Primary and secondary prevention discussed with patient:   -Low sodium cardiac diet   -exercise 30 min a day three days a week  Continue current medications, Coreg, fenofibrate, aspirin, atorvastatin, lisinopril. -AAA screening with abdominal ultrasound yesterday, results not available.

## 2021-07-08 ENCOUNTER — TELEPHONE (OUTPATIENT)
Dept: CARDIOLOGY CLINIC | Age: 76
End: 2021-07-08

## 2021-10-14 ENCOUNTER — APPOINTMENT (OUTPATIENT)
Dept: GENERAL RADIOLOGY | Age: 76
End: 2021-10-14
Payer: MEDICARE

## 2021-10-14 ENCOUNTER — HOSPITAL ENCOUNTER (EMERGENCY)
Age: 76
Discharge: HOME | End: 2021-10-14
Attending: EMERGENCY MEDICINE
Payer: MEDICARE

## 2021-10-14 VITALS
HEIGHT: 69 IN | DIASTOLIC BLOOD PRESSURE: 65 MMHG | WEIGHT: 160 LBS | RESPIRATION RATE: 14 BRPM | TEMPERATURE: 97.9 F | BODY MASS INDEX: 23.7 KG/M2 | HEART RATE: 68 BPM | SYSTOLIC BLOOD PRESSURE: 145 MMHG | OXYGEN SATURATION: 97 %

## 2021-10-14 DIAGNOSIS — J06.9 ACUTE UPPER RESPIRATORY INFECTION: Primary | ICD-10-CM

## 2021-10-14 PROCEDURE — 99283 EMERGENCY DEPT VISIT LOW MDM: CPT

## 2021-10-14 PROCEDURE — 71046 X-RAY EXAM CHEST 2 VIEWS: CPT

## 2021-10-14 PROCEDURE — 6370000000 HC RX 637 (ALT 250 FOR IP): Performed by: EMERGENCY MEDICINE

## 2021-10-14 RX ORDER — GUAIFENESIN/DEXTROMETHORPHAN 100-10MG/5
5 SYRUP ORAL 4 TIMES DAILY PRN
Qty: 120 ML | Refills: 0 | Status: SHIPPED | OUTPATIENT
Start: 2021-10-14 | End: 2021-10-24

## 2021-10-14 RX ORDER — BENZONATATE 100 MG/1
100 CAPSULE ORAL ONCE
Status: COMPLETED | OUTPATIENT
Start: 2021-10-14 | End: 2021-10-14

## 2021-10-14 RX ORDER — GUAIFENESIN 100 MG/5ML
200 SOLUTION ORAL ONCE
Status: COMPLETED | OUTPATIENT
Start: 2021-10-14 | End: 2021-10-14

## 2021-10-14 RX ORDER — BENZONATATE 100 MG/1
100 CAPSULE ORAL 3 TIMES DAILY PRN
Qty: 20 CAPSULE | Refills: 0 | Status: SHIPPED | OUTPATIENT
Start: 2021-10-14 | End: 2021-10-21

## 2021-10-14 RX ORDER — DOXYCYCLINE HYCLATE 100 MG
100 TABLET ORAL 2 TIMES DAILY
Qty: 14 TABLET | Refills: 0 | Status: SHIPPED | OUTPATIENT
Start: 2021-10-14 | End: 2021-10-21

## 2021-10-14 RX ADMIN — GUAIFENESIN 200 MG: 200 SOLUTION ORAL at 11:25

## 2021-10-14 RX ADMIN — BENZONATATE 100 MG: 100 CAPSULE ORAL at 11:25

## 2021-10-14 ASSESSMENT — PAIN DESCRIPTION - LOCATION: LOCATION: THROAT

## 2021-10-14 ASSESSMENT — PAIN SCALES - GENERAL: PAINLEVEL_OUTOF10: 1

## 2021-10-14 NOTE — ED PROVIDER NOTES
EMERGENCY DEPARTMENT ENCOUNTER      CHIEF COMPLAINT:   Cough    HPI: Sukhdeep Desir is a 78 y.o. male who presents to the emergency department, complaining of a cough. The patient states that he had a cough several weeks ago that then resolved. His wife then had symptoms and then his cough started again. He thinks it may be passing it back and forth. He states he has been coughing for the past 1.5 weeks. It is productive for thick yellow sputum. He has nasal congestion. Symptoms are intermittent. There are no exacerbating or alleviating factors. He states his throat is sore from coughing. He denies any associated shortness of breath or chest pain. He denies hemoptysis. He denies leg pain or swelling. He denies a history of DVT or PE. He has been immunized for Covid and just received his booster. Has not been around anybody with confirmed Covid. He denies fevers, chills, chest pain, abdominal pain, vomiting or any other complaints. REVIEW OF SYSTEMS:   Constitutional:  Denies fever or chills  Eyes:  Denies change in visual acuity  HENT: See HPI  Respiratory:  + cough  Cardiovascular:  Denies chest pain or edema  GI:  Denies abdominal pain, nausea, vomiting, bloody stools or diarrhea  :  Denies dysuria  Musculoskeletal:  Denies back pain or joint pain  Integument:  Denies rash  Neurologic:  Denies headache, focal weakness or sensory changes  \"Remaining review of systems reviewed and negative. I have reviewed the nursing triage documentation and agree unless otherwise noted below. \"      PAST MEDICAL HISTORY:   Past Medical History:   Diagnosis Date    Abdominal Doppler 07/06/2021    Normal study    Acute MI ([facility]     1993 - angioplasty, no stent, Dr. Mike Trinh CAD (coronary artery disease)     Diabetes mellitus ([facility]     Essential hypertension 08/18/2020    H/O cardiovascular stress test 04/09/2019    Normal study.  H/O echocardiogram 04/18/2019    EF 55-60%, Normal study.  H/O echocardiogram 12/21/2020    EF 55-60%, Mild MR & LVH. CURRENT MEDICATIONS:   Home medications reviewed. SURGICAL HISTORY:   Past Surgical History:   Procedure Laterality Date    BALLOON ANGIOPLASTY, ARTERY  02/1994    CARDIAC CATHETERIZATION      CATARACT REMOVAL      TOE SURGERY         FAMILY HISTORY:   Family History   Problem Relation Age of Onset    Heart Attack Father     Heart Disease Father        SOCIAL HISTORY:   Social History     Socioeconomic History    Marital status:      Spouse name: Not on file    Number of children: Not on file    Years of education: Not on file    Highest education level: Not on file   Occupational History    Not on file   Tobacco Use    Smoking status: Former Smoker    Smokeless tobacco: Never Used   Substance and Sexual Activity    Alcohol use: No     Comment: Caffeine: Drinks 10 cups caffeinated coffee daily.  Drug use: No    Sexual activity: Not on file   Other Topics Concern    Not on file   Social History Narrative    Not on file     Social Determinants of Health     Financial Resource Strain:     Difficulty of Paying Living Expenses:    Food Insecurity:     Worried About Running Out of Food in the Last Year:     [address] in the Last Year:    Transportation Needs:     Lack of Transportation (Medical):  Lack of Transportation (Non-Medical):    Physical Activity:     Days of Exercise per Week:     Minutes of Exercise per Session:    Stress:     Feeling of Stress :    Social Connections:     Frequency of Communication with Friends and Family:     Frequency of Social Gatherings with Friends and Family:     Attends Zoroastrianism Services:     Active Member of Clubs or Organizations:     Attends Club or Organization Meetings:     Marital Status:    Intimate Partner Violence:     Fear of Current or Ex-Partner:     Emotionally Abused:     Physically Abused:     Sexually Abused:         ALLERGIES: Patient has no known allergies. PHYSICAL EXAM:  VITAL SIGNS:   ED Triage Vitals [10/14/21 1014]   Enc Vitals Group      BP       Pulse 68      Resp 14      Temp 97.9 °F (36.6 °C)      Temp src       SpO2 97 %      Weight 160 lb (72.6 kg)      Height 5' 8.5\" (1.74 m)      Head Circumference       Peak Flow       Pain Score       Pain Loc       Pain Edu? Excl. in [address]? Constitutional:  Non-toxic appearance  HENT: Normocephalic, Atraumatic, Bilateral external ears normal, Oropharynx moist, No oral exudates, Nose normal.  Eyes:  PERRL, Conjunctiva normal, No discharge. Neck: Normal range of motion, No tenderness, Supple, No stridor, No lymphadenopathy . Cardiovascular:  Normal heart rate, Normal rhythm  Pulmonary/Chest:  Normal breath sounds, No respiratory distress, intermittent cough  Abdomen: Bowel sounds normal, Soft, No tenderness, No masses, No pulsatile masses  Extremities:  Normal range of motion, Intact distal pulses, No edema, No tenderness  Skin:  Warm, Dry, No erythema, No rash      EKG Interpretation  None    Radiology / Procedures:  XR CHEST (2 VW) (Final result)  Result time 10/14/21 11:22:43  Final result by Nicole Garcia MD (10/14/21 11:22:43)                Impression:    Mild bibasilar atelectasis.  No focal consolidation. Narrative:    EXAMINATION:   TWO XRAY VIEWS OF THE CHEST     10/14/2021 10:40 am     COMPARISON:   None. HISTORY:   ORDERING SYSTEM PROVIDED HISTORY: Cough   TECHNOLOGIST PROVIDED HISTORY:   Reason for exam:->Cough     FINDINGS:   The heart is normal in size.  Mild bibasilar atelectasis is noted. Jalyn Clement is   no definite focal consolidation, pleural effusion, or pneumothorax. Calcified granuloma is noted at the right lung base.  The osseous structures   are intact. ED COURSE & MEDICAL DECISION MAKING:  Pertinent Labs & Imaging studies reviewed. (See chart for details)  On exam, the patient is afebrile and nontoxic appearing.  He is hemodynamically stable and neurologically intact. Chest x-ray is negative. The patient was treated with Tessalon Perles and Robitussin with some improvement of cough. I suspect that the patient has an upper respiratory infection versus early pneumonia. He has been coughing for 10 days and his cough is productive for thick yellow sputum. At this point, I feel that a course of antibiotics is reasonable. . I have a low suspicion for peritonsillar abscess, epiglottitis, Jamir's angina, pending respiratory failure or sepsis. I feel that the patient is stable for outpatient management with follow up in 2-3 days. The patient is given return precautions. The patient verbalized understanding, was agreeable with plan, and the patient was discharged home in stable condition. Clinical Impression:  1.  Acute upper respiratory infection        Disposition referral (if applicable):  Kosta Farfan MD  [facility]  [phone]    Schedule an appointment as soon as possible for a visit in 2 days      [address]  [phone]  Go to   If symptoms worsen      Disposition medications (if applicable):  Discharge Medication List as of 10/14/2021 11:45 AM      START taking these medications    Details   doxycycline hyclate (VIBRA-TABS) 100 MG tablet Take 1 tablet by mouth 2 times daily for 7 days, Disp-14 tablet, R-0Normal      benzonatate (TESSALON PERLES) 100 MG capsule Take 1 capsule by mouth 3 times daily as needed for Cough, Disp-20 capsule, R-0Normal      guaiFENesin-dextromethorphan (ROBITUSSIN DM) 100-10 MG/5ML syrup Take 5 mLs by mouth 4 times daily as needed for Cough, Disp-120 mL, R-0Normal               Comment: Please note this report has been produced using speech recognition software and may contain errors related to that system including errors in grammar, punctuation, and spelling, as well as words and phrases that may be inappropriate. If there are any questions or concerns please feel free to contact the dictating provider for clarification.              Angel Ferguson MD  10/14/21 3484

## 2021-10-26 RX ORDER — LISINOPRIL 2.5 MG/1
2.5 TABLET ORAL DAILY
Qty: 90 TABLET | Refills: 3 | Status: SHIPPED | OUTPATIENT
Start: 2021-10-26 | End: 2022-01-05

## 2021-10-26 NOTE — TELEPHONE ENCOUNTER
Pharmacy sent request for 90 day refill for lisinopril 2.5 mg tablet. Will pend prescription to [place] for approval. Follow up appointment scheduled 12/6/2021.

## 2021-11-10 NOTE — PROGRESS NOTES
Patient Name: Christiano Muller  Patient : 1945  Patient MRN: [id number]     Primary Oncologist: Kayden Downs MD  Referring Provider: Sunitha Mendez MD     Date of Service: 12/10/2021      Chief Complaint:   No chief complaint on file. He came in for follow-up visit. Patient Active Problem List:     Cellulitis     Edema     CAD (coronary artery disease)     Diabetes mellitus ([facility] [address]     Right inguinal hernia     Anemia, unspecified     Iron deficiency anemia secondary to blood loss (chronic)     Anemia of chronic disorder     Dyslipidemia     Essential hypertension     HPI:   Shayna Bean is a pleasant 63-year-old male patient whom I have been following for chronic anemia since 2009. He had GI workup, including colonoscopy, EGD, capsule endoscopic study in  by Dr. Ami Hernandez. He was found to have diverticulosis. Bone marrow study in 2009 showed trilineage hematopoietic marrow, which appeared normocellular for his age. Iron stains were adequate. FISH for MDS was negative. Flow cytometry showed no monoclonality. He was found to have mild leukopenia. He had surgery on his right toe in 2013 and was hospitalized in 2013 with cellulitis to right lower extremity. He was treated with antibiotic. MRI of the right lower extremity at the time showed findings compatible with cellulitis. Blood test in 2014 showed white cell count of 5.1, hemoglobin 11.3, platelet 812, iron was 96, TIBC 369, transferrin saturation 26, ferritin 105. He had stool guaiac by Dr. Huseyin Quiroz in May 2014. He had umbilical hernia surgery in 2014 by Dr. Kamran Roper. Blood test in 2014 showed white cell count 4.8, hemoglobin 11.4, platelet 104. Iron was 95, ferritin 102, transferrin saturation 31. Thyroid functions were within normal limits. He had colonoscopy in March or 2015. Reportedly, he could have slight inflammation to his colon.   He had his stool checked for C. diff, which was negative. Blood test in August 2015 showed white cell count 4.9, hemoglobin 10.9, platelet 050. Ferritin was 92. Blood test on February 10, 2016, revealed white cell count 4.5, hemoglobin 10.7, hematocrit 33.5, platelet 781, ferritin 96, stable. He stopped taking ASA for three months. Stool guaiac times three in February 2016 were negative. He restarted iron pill on August 8, 2016. Blood test on August 8, 2016, revealed white cell count 4.7, hemoglobin 10.2, platelet 158. Ferritin was 73. From my point of view, he can take his iron pill twice a day. Blood test improved on February 3, 2017, with hemoglobin 11.4. White cell was 4.3, platelet 753. Ferritin was 89, B12 218, with normal methylmalonic acid. I recommended he also take B12 supplements orally. Blood test result was reviewed and he was recommended to take B12 supplements 1,000 mcg a day over the counter. He has been taking this since August 2017. Blood tests in November 2017:  intrinsic factor antibody was negative. Methylmalonic acid, B12 were within normal limits. TSH was within normal limits. CBC   He is a . Blood tests in May 2019 reviewed. Hg dropped slightly. ECHO and stress test in May 6[address]96 were OK. Labs in November 2019 were stable and reviewed. I will defer bone marrow study. He takes B-complex, B-12 pill and Iron pill. He takes 81 mg ASA. Blood test in May 2020 was stable to him. CBC on November 13, 2020 showed WBC 5.7, hemoglobin 12.2, platelet 405. On June 7, 2021 he came in for follow up visit. He had the Covid vaccine. He had colonoscopy in 2020 and reportedly there was no polyp. He also had capsule endoscopic study. Recent labs in May 2021 were reviewed. Ferritin was 93, WBC 6.0, hemoglobin 10.7, MCV 94.2, platelet 203. He is agreeable to continue with surveillance. He takes iron pill once a day and also baby aspirin.   He denied any hematuria, melena or hematochezia. No acute pain, no depression. He denied any chest pain, shortness of breath or productive cough. No fever or chills. No loss of smell or taste. He denies any nausea, vomiting or diarrhea. PAST MEDICAL HISTORY:  Heart disease, chronic arthritis, diabetes. Chronic anemia. He had surgery on his nose in February 2020. FAMILY HISTORY  No blood disorder in the family. No history of malignancy. SOCIAL HISTORY:   He quit smoking in 1993. No history of alcohol abuse. Denied illicit drug use. LABORATORY STUDIES:   Labs in August 2012 showed white cell count 4.4, hemoglobin 11.4, hematocrit 32.1, platelet 513,470. Ferritin 63, iron 69, TIBC 362, transferrin saturation 19 percent. May 2013:  Ferritin 56, iron 68, iron-binding 361, transferrin saturation 19 percent, B12 324. White cell remains stable at 4.2, hemoglobin 11.8, hematocrit 34.5, platelet 180. Review of Systems: \"Per interval history; otherwise 10 point ROS is negative. \"     Vital Signs: There were no vitals taken for this visit. Physical Exam:  CONSTITUTIONAL: awake, alert, cooperative, no apparent distress   EYES: pupils equal, round and reactive to light, sclera clear and pale conjunctiva   ENT: Normocephalic, without obvious abnormality, atraumatic  NECK: supple, symmetrical, no jugular venous distension and no carotid bruits   HEMATOLOGIC/LYMPHATIC: no cervical, supraclavicular or axillary lymphadenopathy   LUNGS: no increased work of breathing and clear to auscultation   CARDIOVASCULAR: regular rate and rhythm, normal S1 and S2, no murmur noted  ABDOMEN: normal bowel sounds, soft, non-distended, non-tender, no masses palpated, no hepatosplenomegaly   MUSCULOSKELETAL: full range of motion noted, tone is normal  NEUROLOGIC: awake, alert, oriented to name, place and time. Motor skills grossly intact. Cranial nerves II through XII are grossly intact  SKIN: Normal skin color, texture, turgor and no jaundice. appears intact   EXTREMITIES: no LE edema. No cyanosis. Labs:  Hematology:  Lab Results   Component Value Date    WBC 6.0 05/28/2021    RBC 3.43 (L) 05/28/2021    HGB 10.7 (L) 05/28/2021    HCT 32.3 (L) 05/28/2021    MCV 94.2 05/28/2021    MCH 31.2 (H) 05/28/2021    MCHC 33.1 05/28/2021    RDW 13.7 05/28/2021     05/28/2021    MPV 9.4 05/28/2021    SEGSPCT 64.0 05/28/2021    EOSRELPCT 1.7 05/28/2021    BASOPCT 0.5 05/28/2021    LYMPHOPCT 25.0 05/28/2021    MONOPCT 8.8 (H) 05/28/2021    SEGSABS 3.9 05/28/2021    EOSABS 0.1 05/28/2021    BASOSABS 0.0 05/28/2021    LYMPHSABS 1.5 05/28/2021    MONOSABS 0.5 05/28/2021    DIFFTYPE AUTOMATED DIFFERENTIAL 05/28/2021     Lab Results   Component Value Date    ESR 26 (H) 12/20/2013     Chemistry:  Lab Results   Component Value Date     06/11/2021    K 3.8 06/11/2021     06/11/2021    CO2 26 06/11/2021    BUN 18 06/11/2021    CREATININE 1.1 06/11/2021    GLUCOSE 219 (H) 06/11/2021    CALCIUM 9.2 06/11/2021    PROT 6.5 05/15/2018    LABALBU 4.4 05/15/2018    BILITOT 0.3 05/15/2018    ALKPHOS 39 (L) 05/15/2018    AST 16 05/15/2018    ALT 12 05/15/2018    LABGLOM >60 06/11/2021    GFRAA >60 06/11/2021    POCGLU 139 (H) 12/25/2013     Lab Results   Component Value Date    MMA 0.25 11/09/2017     Lab Results   Component Value Date    TSHHS 0.961 11/09/2017    T4FREE 1.15 11/12/2014     Immunology:  Lab Results   Component Value Date    PROT 6.5 05/15/2018     Coagulation Panel:  Lab Results   Component Value Date    PROTIME 11.8 12/20/2013    INR 1.09 12/20/2013    APTT 24.5 12/20/2013     Anemia Panel:  Lab Results   Component Value Date    QIYQONPG24 522.2 11/30/2020    FOLATE >20.0 (H) 05/15/2018     Tumor Markers:  Lab Results   Component Value Date    PSA 0.67 12/14/2013        Observations:  No data recorded      Assessment & Plan:    1. He has mild anemia. He could have a low-grade myelodysplasia and iron deficiency.    He had colonoscopy in March or April 2015 by Dr. Spike Montejo. B-12, methylmalonic acid were within normal limits. Blood test in May was reviewed. Hg dropped slightly. Labs in November 2019 were reviewed and stable. Blood test in May 2020 was stable to him. CBC in November 2020 was stable. Labs in May 2021 were reviewed and stable. He is agreeable to continue with close surveillance. 2. I advised him to keep his age-appropriate cancer screening up to date. He had colonoscopy in 2020 which reportedly was negative for polyp. We discussed about healthy diet and lifestyle. Lucero Gip He had both Covid vaccine    RTC in six months or sooner. All of his questions have been answered for today. Recent imaging and labs were reviewed and discussed with the patient. Patent

## 2021-12-03 ENCOUNTER — HOSPITAL ENCOUNTER (OUTPATIENT)
Dept: INFUSION THERAPY | Age: 76
Discharge: HOME | End: 2021-12-03
Payer: MEDICARE

## 2021-12-03 DIAGNOSIS — D64.9 ANEMIA, UNSPECIFIED TYPE: ICD-10-CM

## 2021-12-03 LAB
ALBUMIN SERPL-MCNC: 4.4 GM/DL (ref 3.4–5)
ALP BLD-CCNC: 49 IU/L (ref 40–129)
ALT SERPL-CCNC: 15 U/L (ref 10–40)
ANION GAP SERPL CALCULATED.3IONS-SCNC: 9 MMOL/L (ref 4–16)
AST SERPL-CCNC: 17 IU/L (ref 15–37)
BASOPHILS ABSOLUTE: 0.1 K/CU MM
BASOPHILS RELATIVE PERCENT: 0.9 % (ref 0–1)
BILIRUB SERPL-MCNC: 0.3 MG/DL (ref 0–1)
BUN BLDV-MCNC: 18 MG/DL (ref 6–23)
CALCIUM SERPL-MCNC: 9.6 MG/DL (ref 8.3–10.6)
CHLORIDE BLD-SCNC: 103 MMOL/L (ref 99–110)
CO2: 28 MMOL/L (ref 21–32)
CREAT SERPL-MCNC: 1 MG/DL (ref 0.9–1.3)
DIFFERENTIAL TYPE: ABNORMAL
EOSINOPHILS ABSOLUTE: 0.2 K/CU MM
EOSINOPHILS RELATIVE PERCENT: 3.1 % (ref 0–3)
FERRITIN: 106 NG/ML (ref 30–400)
FOLATE: 14.7 NG/ML (ref 3.1–17.5)
GFR AFRICAN AMERICAN: >60 ML/MIN/1.73M2
GFR NON-AFRICAN AMERICAN: >60 ML/MIN/1.73M2
GLUCOSE BLD-MCNC: 131 MG/DL (ref 70–99)
HCT VFR BLD CALC: 32.9 % (ref 42–52)
HEMOGLOBIN: 10.7 GM/DL (ref 13.5–18)
IRON: 85 UG/DL (ref 59–158)
LYMPHOCYTES ABSOLUTE: 1.5 K/CU MM
LYMPHOCYTES RELATIVE PERCENT: 27.6 % (ref 24–44)
MCH RBC QN AUTO: 30.3 PG (ref 27–31)
MCHC RBC AUTO-ENTMCNC: 32.5 % (ref 32–36)
MCV RBC AUTO: 93.2 FL (ref 78–100)
MONOCYTES ABSOLUTE: 0.6 K/CU MM
MONOCYTES RELATIVE PERCENT: 11.3 % (ref 0–4)
PCT TRANSFERRIN: 25 % (ref 10–44)
PDW BLD-RTO: 13.8 % (ref 11.7–14.9)
PLATELET # BLD: 203 K/CU MM (ref 140–440)
PMV BLD AUTO: 9.9 FL (ref 7.5–11.1)
POTASSIUM SERPL-SCNC: 3.8 MMOL/L (ref 3.5–5.1)
RBC # BLD: 3.53 M/CU MM (ref 4.6–6.2)
SEGMENTED NEUTROPHILS ABSOLUTE COUNT: 3.1 K/CU MM
SEGMENTED NEUTROPHILS RELATIVE PERCENT: 57.1 % (ref 36–66)
SODIUM BLD-SCNC: 140 MMOL/L (ref 135–145)
TOTAL IRON BINDING CAPACITY: 340 UG/DL (ref 250–450)
TOTAL PROTEIN: 6.4 GM/DL (ref 6.4–8.2)
UNSATURATED IRON BINDING CAPACITY: 255 UG/DL (ref 110–370)
VITAMIN B-12: 608 PG/ML (ref 211–911)
WBC # BLD: 5.4 K/CU MM (ref 4–10.5)

## 2021-12-03 PROCEDURE — 83550 IRON BINDING TEST: CPT

## 2021-12-03 PROCEDURE — 82746 ASSAY OF FOLIC ACID SERUM: CPT

## 2021-12-03 PROCEDURE — 83540 ASSAY OF IRON: CPT

## 2021-12-03 PROCEDURE — 36415 COLL VENOUS BLD VENIPUNCTURE: CPT

## 2021-12-03 PROCEDURE — 80053 COMPREHEN METABOLIC PANEL: CPT

## 2021-12-03 PROCEDURE — 85025 COMPLETE CBC W/AUTO DIFF WBC: CPT

## 2021-12-03 PROCEDURE — 82607 VITAMIN B-12: CPT

## 2021-12-03 PROCEDURE — 82728 ASSAY OF FERRITIN: CPT

## 2021-12-06 ENCOUNTER — OFFICE VISIT (OUTPATIENT)
Dept: CARDIOLOGY CLINIC | Age: 76
End: 2021-12-06
Payer: MEDICARE

## 2021-12-06 VITALS
SYSTOLIC BLOOD PRESSURE: 92 MMHG | DIASTOLIC BLOOD PRESSURE: 62 MMHG | BODY MASS INDEX: 23.67 KG/M2 | HEART RATE: 56 BPM | WEIGHT: 159.8 LBS | HEIGHT: 69 IN

## 2021-12-06 DIAGNOSIS — I10 ESSENTIAL HYPERTENSION: ICD-10-CM

## 2021-12-06 DIAGNOSIS — R42 DIZZINESS: ICD-10-CM

## 2021-12-06 DIAGNOSIS — E78.5 DYSLIPIDEMIA: ICD-10-CM

## 2021-12-06 DIAGNOSIS — I25.10 CORONARY ARTERY DISEASE INVOLVING NATIVE CORONARY ARTERY OF NATIVE HEART WITHOUT ANGINA PECTORIS: Primary | ICD-10-CM

## 2021-12-06 PROCEDURE — 1036F TOBACCO NON-USER: CPT | Performed by: NURSE PRACTITIONER

## 2021-12-06 PROCEDURE — G8420 CALC BMI NORM PARAMETERS: HCPCS | Performed by: NURSE PRACTITIONER

## 2021-12-06 PROCEDURE — 1123F ACP DISCUSS/DSCN MKR DOCD: CPT | Performed by: NURSE PRACTITIONER

## 2021-12-06 PROCEDURE — 3017F COLORECTAL CA SCREEN DOC REV: CPT | Performed by: NURSE PRACTITIONER

## 2021-12-06 PROCEDURE — 99214 OFFICE O/P EST MOD 30 MIN: CPT | Performed by: NURSE PRACTITIONER

## 2021-12-06 PROCEDURE — G8484 FLU IMMUNIZE NO ADMIN: HCPCS | Performed by: NURSE PRACTITIONER

## 2021-12-06 PROCEDURE — 4040F PNEUMOC VAC/ADMIN/RCVD: CPT | Performed by: NURSE PRACTITIONER

## 2021-12-06 PROCEDURE — G8427 DOCREV CUR MEDS BY ELIG CLIN: HCPCS | Performed by: NURSE PRACTITIONER

## 2021-12-06 RX ORDER — GLUCOSAMINE/MSM/CHONDROITIN A 500-83-400
30 TABLET ORAL DAILY
COMMUNITY

## 2021-12-06 RX ORDER — PSYLLIUM SEED (WITH DEXTROSE)
3.4 POWDER (GRAM) ORAL 2 TIMES DAILY
COMMUNITY
End: 2022-06-10

## 2021-12-06 ASSESSMENT — ENCOUNTER SYMPTOMS: SHORTNESS OF BREATH: 0

## 2021-12-06 NOTE — PROGRESS NOTES
[facility] ([facility] PHYSICAL [facility]  Kashif Olvera  Phone: [phone]    Fax [phone]                  Danish Lorenzo MD, Mahi Tobias MD, Nick Whitlock MD, MD Mp William MD Vicki Bares, MD Lajean Kasal, APRN      Donna Frida, APRN  [facility] [place], [facility], [facility]        Cardiology Progress Note      12/6/2021    RE: Yoli Bustamante  (1945)                             Primary cardiologist: Dr. Mp Mueller       Subjective:  CC:   1. Coronary artery disease involving native coronary artery of native heart without angina pectoris    2. Essential hypertension    3. Dyslipidemia    4. Dizziness        HPI: Yoil Bustamante, who is a  78y.o. year old male with a past medical history as listed below. Patient presents to the office for follow up on CAD PTCA in 8848,0775, HTN, dizziness, and hyperlipidemia. Patient reports having several episodes over the last couple months of feeling lightheaded and dizzy. He denies any syncopal episodes. Patient is  an active male who walks regularly. Patient is  compliant with medications. Patient denies any chest pain, shortness of breath, syncope, or palpitations. Past Medical History:   Diagnosis Date    Abdominal Doppler 07/06/2021    Normal study    Acute MI [facility])     1993 - angioplasty, no stent, Dr. Erich Beavers CAD (coronary artery disease)     [facility]     Essential hypertension 08/18/2020    H/O cardiovascular stress test 04/09/2019    Normal study.  H/O echocardiogram 04/18/2019    EF 55-60%, Normal study.  H/O echocardiogram 12/21/2020    EF 55-60%, Mild MR & LVH.        Current Outpatient Medications   Medication Sig Dispense Refill    psyllium (METAMUCIL) 28.3 % POWD powder Take 3.4 g by mouth 2 times daily      Coenzyme Q10 (COQ-10) 30 MG CAPS Take 30 mg by mouth daily      lisinopril (PRINIVIL;ZESTRIL) 2.5 MG tablet Take 1 tablet by mouth daily 90 tablet 3    fenofibrate (TRICOR) 145 MG tablet Take 145 mg by mouth daily      budesonide (ENTOCORT EC) 3 MG extended release capsule Take 3 mg by mouth daily      metFORMIN (GLUCOPHAGE-XR) 500 MG extended release tablet Take 500 mg by mouth 2 times daily (with meals)       b complex vitamins capsule Take 1 capsule by mouth daily      ferrous sulfate 325 (65 Fe) MG tablet Take 325 mg by mouth daily (with breakfast)      Cyanocobalamin (VITAMIN B 12 PO) Take by mouth every other day       pregabalin (LYRICA) 75 MG capsule Take 75 mg by mouth 3 times daily.  aspirin 81 MG tablet Take 81 mg by mouth daily       atorvastatin (LIPITOR) 10 MG tablet Take 10 mg by mouth daily. No current facility-administered medications for this visit. Review of Systems:  Review of Systems   Respiratory: Negative for shortness of breath. Cardiovascular: Negative for chest pain, palpitations and leg swelling. Musculoskeletal: Negative. Skin: Negative. Neurological: Positive for dizziness. Negative for weakness. All other systems reviewed and are negative. Objective:      Physical Exam:  BP 92/62 (Site: Left Upper Arm, Position: Standing, Cuff Size: Medium Adult)   Pulse 56   Ht 5' 8.5\" (1.74 m)   Wt 159 lb 12.8 oz (72.5 kg)   BMI 23.94 kg/m²   Wt Readings from Last 3 Encounters:   12/06/21 159 lb 12.8 oz (72.5 kg)   10/14/21 160 lb (72.6 kg)   07/07/21 162 lb 9.6 oz (73.8 kg)     Body mass index is 23.94 kg/m². Physical exam:  Physical Exam  Constitutional:       Appearance: He is well-developed. Cardiovascular:      Rate and Rhythm: Normal rate and regular rhythm. Pulses: Intact distal pulses. Dorsalis pedis pulses are 2+ on the right side and 2+ on the left side. Posterior tibial pulses are 2+ on the right side and 2+ on the left side.       Heart sounds: Normal heart sounds, S1 normal and S2 normal.   Pulmonary:      Effort: Pulmonary effort is normal.      Breath sounds: Normal breath sounds. Musculoskeletal:         General: Normal range of motion. Skin:     General: Skin is warm and dry. Neurological:      Mental Status: He is alert and oriented to person, place, and time. DATA:  No results found for: CKTOTAL, CKMB, CKMBINDEX, TROPONINI  BNP:  No results found for: BNP  PT/INR:  No results found for: PTINR  Lab Results   Component Value Date    LABA1C 6.6 (H) 03/17/2014    LABA1C 6.8 (H) 12/14/2013     Lab Results   Component Value Date    CHOL 110 03/17/2014    TRIG 73 03/17/2014    HDL 35 (L) 03/17/2014    LDLDIRECT 61 03/17/2014     Lab Results   Component Value Date    ALT 15 12/03/2021    AST 17 12/03/2021     TSH:  No results found for: TSH    Vitals:    12/06/21 1021   BP: 92/62   Pulse: 56       Echo:12/21/20  Left ventricular function is normal, EF is estimated at 55-60%. Mild left ventricular hypertrophy. Normal diastolic filling pattern for age. Mildly dilated left atrium. Mild mitral regurgitation is present. RVSP is 12 mmHg. No evidence of pericardial effusion. Stress Test:4/6/19  No ischemia        The ASCVD Risk score (Natacha Grady., et al., 2013) failed to calculate for the following reasons:    Cannot find a previous HDL lab    Cannot find a previous total cholesterol lab      Assessment/ Plan:   CAD (coronary artery disease)   -PTCA in 1993 and 1994. Stress test in 2019 showed no ischemia.     Primary and secondary prevention discussed with patient:              -Low sodium cardiac diet              -exercise 30 min a day three days a week  Continue current medications, fenofibrate, aspirin, atorvastatin, lisinopril.     -AAA screening negative.     Essential hypertension   -Hypotension with near syncope and dizziness.  Stop Coreg continue with lisinopril.       Dyslipidemia       104            Triglycerides 0 - 149 mg/dL 96          HDL Cholesterol >39 mg/dL 38Low           VLDL Cholesterol Marlo 5 - 40 mg/dL 18          LDL CHOL CALC ([facility]) 0 - 99 mg/dL 48       -At or near goal yes    -He is to continue current medications (Lipitor 10 mg and fenofibrate 145 mg) Hepatic function panel WNL. No abdominal pain. No myalgias.      -The nature of cardiac risk has been fully discussed with this patient. I have made him aware of his LDL target goal given his cardiovascular risk analysis. I have discussed the appropriate diet. The need for lifelong compliance in order to reduce risk is stressed. A regular exercise program is recommended to help achieve and maintain normal body weight, fitness and improve lipid balance. A written copy of a low fat, low cholesterol diet has been given to the patient.         Diabetes mellitus ([facility] [address]   -Managed by PCP, last A1C 7.7     Dizziness  -hypotension and bradycardia with near syncope. Stop coreg, continue with lisinopril 2.5 mg. Increase oral fluid intake. Patient seen, interviewed and examined. Testing was reviewed. Patient is encouraged to exercise even a brisk walk for 30 minutes at least 3 to 4 times a week. Lifestyle and risk factor modificatons discussed. Various goals are discussed and questions answered. Continue current medications. Appropriate prescriptions are addressed. Questions answered and patient verbalizes understanding. Call for any problems, questions, or concerns. Pt is to follow up in 2 week for Cardiac management    Electronically signed by Keya Amos.  ANNABELLA Huerta CNP on 12/6/2021 at 10:56 AM

## 2021-12-10 ENCOUNTER — HOSPITAL ENCOUNTER (OUTPATIENT)
Dept: INFUSION THERAPY | Age: 76
Discharge: HOME | End: 2021-12-10
Payer: MEDICARE

## 2021-12-10 ENCOUNTER — OFFICE VISIT (OUTPATIENT)
Dept: ONCOLOGY | Age: 76
End: 2021-12-10
Payer: MEDICARE

## 2021-12-10 VITALS
TEMPERATURE: 96.6 F | HEIGHT: 69 IN | WEIGHT: 160 LBS | DIASTOLIC BLOOD PRESSURE: 65 MMHG | SYSTOLIC BLOOD PRESSURE: 133 MMHG | OXYGEN SATURATION: 94 % | HEART RATE: 78 BPM | BODY MASS INDEX: 23.7 KG/M2

## 2021-12-10 DIAGNOSIS — D64.9 ANEMIA, UNSPECIFIED TYPE: Primary | ICD-10-CM

## 2021-12-10 DIAGNOSIS — D50.0 IRON DEFICIENCY ANEMIA SECONDARY TO BLOOD LOSS (CHRONIC): ICD-10-CM

## 2021-12-10 PROCEDURE — G8420 CALC BMI NORM PARAMETERS: HCPCS | Performed by: NURSE PRACTITIONER

## 2021-12-10 PROCEDURE — 4040F PNEUMOC VAC/ADMIN/RCVD: CPT | Performed by: NURSE PRACTITIONER

## 2021-12-10 PROCEDURE — G8427 DOCREV CUR MEDS BY ELIG CLIN: HCPCS | Performed by: NURSE PRACTITIONER

## 2021-12-10 PROCEDURE — 99213 OFFICE O/P EST LOW 20 MIN: CPT | Performed by: NURSE PRACTITIONER

## 2021-12-10 PROCEDURE — 1036F TOBACCO NON-USER: CPT | Performed by: NURSE PRACTITIONER

## 2021-12-10 PROCEDURE — 99211 OFF/OP EST MAY X REQ PHY/QHP: CPT

## 2021-12-10 PROCEDURE — G8484 FLU IMMUNIZE NO ADMIN: HCPCS | Performed by: NURSE PRACTITIONER

## 2021-12-10 PROCEDURE — 1123F ACP DISCUSS/DSCN MKR DOCD: CPT | Performed by: NURSE PRACTITIONER

## 2021-12-10 PROCEDURE — 3017F COLORECTAL CA SCREEN DOC REV: CPT | Performed by: NURSE PRACTITIONER

## 2021-12-10 ASSESSMENT — PATIENT HEALTH QUESTIONNAIRE - PHQ9
SUM OF ALL RESPONSES TO PHQ QUESTIONS 1-9: 0
SUM OF ALL RESPONSES TO PHQ QUESTIONS 1-9: 0
SUM OF ALL RESPONSES TO PHQ9 QUESTIONS 1 & 2: 0
1. LITTLE INTEREST OR PLEASURE IN DOING THINGS: 0
2. FEELING DOWN, DEPRESSED OR HOPELESS: 0
SUM OF ALL RESPONSES TO PHQ QUESTIONS 1-9: 0

## 2021-12-10 NOTE — PROGRESS NOTES
colon.  He had his stool checked for C. diff, which was negative. Blood test in August 2015 showed white cell count 4.9, hemoglobin 10.9, platelet 800. Ferritin was 92. Blood test on February 10, 2016, revealed white cell count 4.5, hemoglobin 10.7, hematocrit 33.5, platelet 939, ferritin 96, stable. He stopped taking ASA for three months. Stool guaiac times three in February 2016 were negative. He restarted iron pill on August 8, 2016. Blood test on August 8, 2016, revealed white cell count 4.7, hemoglobin 10.2, platelet 006. Ferritin was 73. From my point of view, he can take his iron pill twice a day. Blood test improved on February 3, 2017, with hemoglobin 11.4. White cell was 4.3, platelet 069. Ferritin was 89, B12 218, with normal methylmalonic acid. I recommended he also take B12 supplements orally. Blood test result was reviewed and he was recommended to take B12 supplements 1,000 mcg a day over the counter. He has been taking this since August 2017. Blood tests in November 2017:  intrinsic factor antibody was negative. Methylmalonic acid, B12 were within normal limits. TSH was within normal limits. CBC   He is a . Blood tests in May 2019 reviewed. Hg dropped slightly. ECHO and stress test in May 47703 were OK. Labs in November 2019 were stable and reviewed. I will defer bone marrow study. He takes B-complex, B-12 pill and Iron pill. He takes 81 mg ASA. Blood test in May 2020 was stable to him. CBC on November 13, 2020 showed WBC 5.7, hemoglobin 12.2, platelet 103. He had the Covid vaccine. He had colonoscopy in 2020 and reportedly there was no polyp. He also had capsule endoscopic study. Recent labs in May 2021 were reviewed. Ferritin was 93, WBC 6.0, hemoglobin 10.7, MCV 94.2, platelet 828. He is agreeable to continue with surveillance. Presented on December 10, 2021 for scheduled follow up. Overall doing well.  Review of labs from December 3, 2021 revealed WBC 5.4, hemoglobin 10.7, hematocrit 32.9, MCV 93.2, platelets 376QIL grossly unremarkable, ferritin 106, iron 85 TIBC 340, transferrin percent 25 vitamin B12 608, folate 14.7. He takes iron pill daily as well as baby aspirin. Denies fever, chills, night sweats, no dizziness, visual changes, or headache. Denies mucositis, dysphagia, no cough, chest pain, hemoptysis, shortness of breath, no nausea, vomiting, diarrhea, no constipation, no melena or hematochezia, no dysuria, hematuria, no lower extremity edema or calf pain. Denies acute pain. No worsening depression. PAST MEDICAL HISTORY:  Heart disease, chronic arthritis, diabetes. Chronic anemia. He had surgery on his nose in February 2020. FAMILY HISTORY  No blood disorder in the family. No history of malignancy. SOCIAL HISTORY:   He quit smoking in 1993. No history of alcohol abuse. Denied illicit drug use. LABORATORY STUDIES:   Labs in August 2012 showed white cell count 4.4, hemoglobin 11.4, hematocrit 32.1, platelet 526,514. Ferritin 63, iron 69, TIBC 362, transferrin saturation 19 percent. May 2013:  Ferritin 56, iron 68, iron-binding 361, transferrin saturation 19 percent, B12 324. White cell remains stable at 4.2, hemoglobin 11.8, hematocrit 34.5, platelet 824. Review of Systems: \"Per interval history; otherwise 10 point ROS is negative. \"     Vital Signs:  /65 (Site: Left Upper Arm, Position: Sitting, Cuff Size: Medium Adult)   Pulse 78   Temp 96.6 °F (35.9 °C) (Infrared)   Ht 5' 8.5\" (1.74 m)   Wt 160 lb (72.6 kg)   SpO2 94%   BMI 23.97 kg/m²     Physical Exam:  CONSTITUTIONAL: awake, alert, cooperative, no apparent distress   EYES: pupils equal, round and reactive to light, sclera clear and pale conjunctiva   ENT: Normocephalic, without obvious abnormality, atraumatic  NECK: supple, symmetrical, no jugular venous distension and no carotid bruits   HEMATOLOGIC/LYMPHATIC: no cervical, supraclavicular or axillary lymphadenopathy   LUNGS: no increased work of breathing and clear to auscultation   CARDIOVASCULAR: regular rate and rhythm, normal S1 and S2, no murmur noted  ABDOMEN: normal bowel sounds, soft, non-distended, non-tender, no masses palpated, no hepatosplenomegaly   MUSCULOSKELETAL: full range of motion noted, tone is normal  NEUROLOGIC: awake, alert, oriented to name, place and time. Motor skills grossly intact. Cranial nerves II through XII are grossly intact  SKIN: Normal skin color, texture, turgor and no jaundice. appears intact   EXTREMITIES: no LE edema. No cyanosis.      Labs:  Hematology:  Lab Results   Component Value Date    WBC 5.4 12/03/2021    RBC 3.53 (L) 12/03/2021    HGB 10.7 (L) 12/03/2021    HCT 32.9 (L) 12/03/2021    MCV 93.2 12/03/2021    MCH 30.3 12/03/2021    MCHC 32.5 12/03/2021    RDW 13.8 12/03/2021     12/03/2021    MPV 9.9 12/03/2021    SEGSPCT 57.1 12/03/2021    EOSRELPCT 3.1 (H) 12/03/2021    BASOPCT 0.9 12/03/2021    LYMPHOPCT 27.6 12/03/2021    MONOPCT 11.3 (H) 12/03/2021    SEGSABS 3.1 12/03/2021    EOSABS 0.2 12/03/2021    BASOSABS 0.1 12/03/2021    LYMPHSABS 1.5 12/03/2021    MONOSABS 0.6 12/03/2021    DIFFTYPE AUTOMATED DIFFERENTIAL 12/03/2021     Lab Results   Component Value Date    ESR 26 (H) 12/20/2013     Chemistry:  Lab Results   Component Value Date     12/03/2021    K 3.8 12/03/2021     12/03/2021    CO2 28 12/03/2021    BUN 18 12/03/2021    CREATININE 1.0 12/03/2021    GLUCOSE 131 (H) 12/03/2021    CALCIUM 9.6 12/03/2021    PROT 6.4 12/03/2021    LABALBU 4.4 12/03/2021    BILITOT 0.3 12/03/2021    ALKPHOS 49 12/03/2021    AST 17 12/03/2021    ALT 15 12/03/2021    LABGLOM >60 12/03/2021    GFRAA >60 12/03/2021    POCGLU 139 (H) 12/25/2013     Lab Results   Component Value Date    MMA 0.25 11/09/2017     Lab Results   Component Value Date    TSHHS 0.961 11/09/2017    T4FREE 1.15 11/12/2014     Immunology:  Lab Results   Component Value Date    PROT 6.4 12/03/2021     Coagulation Panel:  Lab Results   Component Value Date    PROTIME 11.8 12/20/2013    INR 1.09 12/20/2013    APTT 24.5 12/20/2013     Anemia Panel:  Lab Results   Component Value Date    JPEZZEYJ43 608.0 12/03/2021    FOLATE 14.7 12/03/2021     Tumor Markers:  Lab Results   Component Value Date    PSA 0.67 12/14/2013        Observations:  PHQ-9 Total Score: 0 (12/10/2021 11:49 AM)        Assessment & Plan:    1. He has mild anemia. He could have a low-grade myelodysplasia and iron deficiency. He had colonoscopy in March or April 2015 by Dr. Cheyanne Staples. B-12, methylmalonic acid were within normal limits. Blood test in May was reviewed. Hg dropped slightly. Labs in November 2019 were reviewed and stable. Blood test in May 2020 was stable to him. CBC in November 2020 was stable. Labs in May 2021 were reviewed and stable. He is agreeable to active surveillance. 2. I advised him to keep his age-appropriate cancer screening up to date. He had colonoscopy in 2020 which reportedly was negative for polyp. Had colonoscopy 1 month ago without polyp. Started on Creon by GI. We discussed about healthy diet and lifestyle. [place] Main He had both Covid vaccine    RTC in six months or sooner. All of his questions have been answered for today. Recent imaging and labs were reviewed and discussed with the patient.

## 2021-12-10 NOTE — PROGRESS NOTES
MA Rooming Questions  Patient: Sukhdeep Desir  MRN: [id number]    Date: 12/10/2021        1. Do you have any new issues?   no         2. Do you need any refills on medications?    no    3. Have you had any imaging done since your last visit?   no    4. Have you been hospitalized or seen in the emergency room since your last visit here?   no    5. Did the patient have a depression screening completed today?  Yes    PHQ-9 Total Score: 0 (12/10/2021 11:49 AM)       PHQ-9 Given to (if applicable):               PHQ-9 Score (if applicable):                     [] Positive     [x]  Negative              Does question #9 need addressed (if applicable)                     [] Yes    []  No               Nicky Cali CMA

## 2022-01-05 ENCOUNTER — NURSE ONLY (OUTPATIENT)
Dept: CARDIOLOGY CLINIC | Age: 77
End: 2022-01-05
Payer: MEDICARE

## 2022-01-05 VITALS
HEART RATE: 56 BPM | HEIGHT: 69 IN | SYSTOLIC BLOOD PRESSURE: 144 MMHG | DIASTOLIC BLOOD PRESSURE: 80 MMHG | BODY MASS INDEX: 24.29 KG/M2 | WEIGHT: 164 LBS

## 2022-01-05 DIAGNOSIS — I10 ESSENTIAL HYPERTENSION: Primary | ICD-10-CM

## 2022-01-05 PROCEDURE — 99211 OFF/OP EST MAY X REQ PHY/QHP: CPT | Performed by: NURSE PRACTITIONER

## 2022-01-05 RX ORDER — BLOOD SUGAR DIAGNOSTIC
STRIP MISCELLANEOUS
COMMUNITY
Start: 2022-01-03

## 2022-01-05 RX ORDER — LANCETS 33 GAUGE
EACH MISCELLANEOUS
COMMUNITY
Start: 2022-01-03

## 2022-01-05 RX ORDER — LISINOPRIL 5 MG/1
5 TABLET ORAL DAILY
Qty: 30 TABLET | Refills: 2 | Status: ON HOLD | OUTPATIENT
Start: 2022-01-05 | End: 2022-06-28 | Stop reason: HOSPADM

## 2022-01-05 NOTE — PROGRESS NOTES
GIANNI ([facility], [place] [facility], [address] [facility],Third Floor  Phone: [phone]    Fax [phone]                  Gloria Acosta MD, Shae Jha MD, [address] [facility], MD, MD Rebel Bryson MD,[facility]    Nelson العلي MD, Diandra Ansari MD, [address], ANNABELLA Johnson, ANNABELLA Franks, ANNABELLA De Jesus, APRN      Blood pressure check     Pt is here for a 2 week BP check. Patient had hypotension and Coreg was discontinued. Vitals:    01/05/22 1120 01/05/22 1124   BP: (!) 150/82 (!) 144/80   Site: Left Upper Arm Right Upper Arm   Position: Sitting Sitting   Cuff Size: Medium Adult Medium Adult   Pulse: 56    Weight: 164 lb (74.4 kg)    Height: 5' 8.5\" (1.74 m)         Wt Readings from Last 3 Encounters:   01/05/22 164 lb (74.4 kg)   12/10/21 160 lb (72.6 kg)   12/06/21 159 lb 12.8 oz (72.5 kg)        Plan for pt is to increase lisinopril to 5 mg daily. Follow up in 4 months. Pt is to report any dizziness or syncope to the office. Electronically signed by Reina Morin.  ANNABELLA Solis - CNP on 1/5/2022 at 11:34 AM

## 2022-01-05 NOTE — PATIENT INSTRUCTIONS
**It is YOUR responsibilty to bring medication bottles and/or updated medication list to [address] [place] [address]. This will allow us to better serve you and all your healthcare needs**  Please be informed that if you contact our office outside of normal business hours the physician on call cannot help with any scheduling or rescheduling issues, procedure instruction questions or any type of medication issue. We advise you for any urgent/emergency that you go to the nearest emergency room!     PLEASE CALL OUR OFFICE DURING NORMAL BUSINESS HOURS    Monday - Friday   8 am to 5 pm    [place]: Saria[place] 12: [phone]    [place]:  [phone]

## 2022-05-09 ENCOUNTER — OFFICE VISIT (OUTPATIENT)
Dept: CARDIOLOGY CLINIC | Age: 77
End: 2022-05-09
Payer: MEDICARE

## 2022-05-09 VITALS
OXYGEN SATURATION: 99 % | SYSTOLIC BLOOD PRESSURE: 130 MMHG | BODY MASS INDEX: 24.31 KG/M2 | HEART RATE: 58 BPM | WEIGHT: 160.4 LBS | HEIGHT: 68 IN | DIASTOLIC BLOOD PRESSURE: 52 MMHG

## 2022-05-09 DIAGNOSIS — I10 ESSENTIAL HYPERTENSION: ICD-10-CM

## 2022-05-09 DIAGNOSIS — R09.89 BRUIT OF LEFT CAROTID ARTERY: ICD-10-CM

## 2022-05-09 DIAGNOSIS — I25.10 CORONARY ARTERY DISEASE INVOLVING NATIVE CORONARY ARTERY OF NATIVE HEART WITHOUT ANGINA PECTORIS: Primary | ICD-10-CM

## 2022-05-09 DIAGNOSIS — E78.5 DYSLIPIDEMIA: ICD-10-CM

## 2022-05-09 PROCEDURE — G8420 CALC BMI NORM PARAMETERS: HCPCS | Performed by: NURSE PRACTITIONER

## 2022-05-09 PROCEDURE — G8427 DOCREV CUR MEDS BY ELIG CLIN: HCPCS | Performed by: NURSE PRACTITIONER

## 2022-05-09 PROCEDURE — 1036F TOBACCO NON-USER: CPT | Performed by: NURSE PRACTITIONER

## 2022-05-09 PROCEDURE — 99214 OFFICE O/P EST MOD 30 MIN: CPT | Performed by: NURSE PRACTITIONER

## 2022-05-09 PROCEDURE — 3017F COLORECTAL CA SCREEN DOC REV: CPT | Performed by: NURSE PRACTITIONER

## 2022-05-09 PROCEDURE — 4040F PNEUMOC VAC/ADMIN/RCVD: CPT | Performed by: NURSE PRACTITIONER

## 2022-05-09 PROCEDURE — 1123F ACP DISCUSS/DSCN MKR DOCD: CPT | Performed by: NURSE PRACTITIONER

## 2022-05-09 RX ORDER — LOPERAMIDE HYDROCHLORIDE 2 MG/1
1 TABLET ORAL 2 TIMES DAILY
COMMUNITY
Start: 2022-03-25

## 2022-05-09 RX ORDER — BISMUTH SUBSALICYLATE 262 MG/1
262 TABLET, CHEWABLE ORAL 2 TIMES DAILY
COMMUNITY
Start: 2022-04-15 | End: 2022-08-05 | Stop reason: ALTCHOICE

## 2022-05-09 ASSESSMENT — ENCOUNTER SYMPTOMS: SHORTNESS OF BREATH: 0

## 2022-05-09 NOTE — PROGRESS NOTES
GIANNI [facility], [place] PHYSICAL REHABILITATION [facility] [address], [address] [facility],Third [address]  Phone: [phone]    Fax [phone]                  Job Talamantes MD, Elida Melendez MD, [address] MD, MD Filiberto Thompson MD Paulett Kohler, MD Minerva Rudd, APRN      Navdeep Marrow, APRN  Jolie Lieu, One \[facility]\"", APRN        Cardiology Progress Note      5/9/2022    RE: Mariola Valentine  (1945)                             Primary cardiologist: Dr. Filiberto Oleary       Subjective:  CC:   1. Coronary artery disease involving native coronary artery of native heart without angina pectoris    2. Essential hypertension    3. Dyslipidemia    4. Bruit of left carotid artery        HPI: Mariola Valentine, who is a  78y.o. year old male with a past medical history as listed below. Patient presents to the office for follow up on CAD PTCA in [id number],[id number], HTN, carotid bruit, and hyperlipidemia. Previously had difficulties with dizziness and hypotension, BB was discontinued at that time. Dizziness has since resolved. Patient is  an active male who walks regularly. Patient is  compliant with medications. Patient denies any chest pain, shortness of breath, syncope, or palpitations. Past Medical History:   Diagnosis Date    Abdominal Doppler 07/06/2021    Normal study    Acute MI [facility])     1993 - angioplasty, no stent, Dr. Cary Ferro CAD (coronary artery disease)     Diabetes mellitus ([facility] [address]     Essential hypertension 08/18/2020    H/O cardiovascular stress test 04/09/2019    Normal study.  H/O echocardiogram 04/18/2019    EF 55-60%, Normal study.  H/O echocardiogram 12/21/2020    EF 55-60%, Mild MR & LVH.        Current Outpatient Medications   Medication Sig Dispense Refill    loperamide (IMODIUM A-D) 2 MG tablet Take 1 tablet by mouth 2 times daily      bismuth subsalicylate (PEPTO BISMOL) 262 MG chewable tablet Take 262 mg by mouth 2 times daily 2 tablets twice daily      Lancets (ONETOUCH DELICA PLUS [id number]) MISC       ONETOUCH VERIO strip       lisinopril (PRINIVIL;ZESTRIL) 5 MG tablet Take 1 tablet by mouth daily 30 tablet 2    lipase-protease-amylase (CREON) 03126-717777 units CPEP delayed release capsule Take 1 capsule by mouth After every meal      psyllium (METAMUCIL) 28.3 % POWD powder Take 3.4 g by mouth 2 times daily      Coenzyme Q10 (COQ-10) 30 MG CAPS Take 30 mg by mouth daily      fenofibrate (TRICOR) 145 MG tablet Take 145 mg by mouth daily      budesonide (ENTOCORT EC) 3 MG extended release capsule Take 3 mg by mouth daily      metFORMIN (GLUCOPHAGE-XR) 500 MG extended release tablet Take 500 mg by mouth 2 times daily (with meals)       b complex vitamins capsule Take 1 capsule by mouth daily      ferrous sulfate 325 (65 Fe) MG tablet Take 325 mg by mouth daily (with breakfast)      Cyanocobalamin (VITAMIN B 12 PO) Take by mouth every other day       pregabalin (LYRICA) 75 MG capsule Take 75 mg by mouth 3 times daily.  aspirin 81 MG tablet Take 81 mg by mouth daily       atorvastatin (LIPITOR) 10 MG tablet Take 10 mg by mouth daily. No current facility-administered medications for this visit. Review of Systems:  Review of Systems   Respiratory: Negative for shortness of breath. Cardiovascular: Negative for chest pain, palpitations and leg swelling. Musculoskeletal: Negative. Skin: Negative. Neurological: Negative for dizziness and weakness. All other systems reviewed and are negative. Objective:      Physical Exam:  BP (!) 130/52 (Site: Left Upper Arm, Position: Sitting, Cuff Size: Medium Adult)   Pulse 58   Ht 5' 8\" (1.727 m)   Wt 160 lb 6.4 oz (72.8 kg)   SpO2 99%   BMI 24.39 kg/m²   Wt Readings from Last 3 Encounters:   05/09/22 160 lb 6.4 oz (72.8 kg)   01/05/22 164 lb (74.4 kg)   12/10/21 160 lb (72.6 kg)     Body mass index is 24.39 kg/m².     Physical exam:  Physical Exam  Constitutional:       Appearance: He is well-developed. Cardiovascular:      Rate and Rhythm: Normal rate and regular rhythm. Pulses: Intact distal pulses. Carotid pulses are on the left side with bruit. Dorsalis pedis pulses are 2+ on the right side and 2+ on the left side. Posterior tibial pulses are 2+ on the right side and 2+ on the left side. Heart sounds: Normal heart sounds, S1 normal and S2 normal.   Pulmonary:      Effort: Pulmonary effort is normal.      Breath sounds: Normal breath sounds. Musculoskeletal:         General: Normal range of motion. Skin:     General: Skin is warm and dry. Neurological:      Mental Status: He is alert and oriented to person, place, and time. DATA:  No results found for: CKTOTAL, CKMB, CKMBINDEX, TROPONINI  BNP:  No results found for: BNP  PT/INR:  No results found for: PTINR  Lab Results   Component Value Date    LABA1C 6.6 (H) 03/17/2014    LABA1C 6.8 (H) 12/14/2013     Lab Results   Component Value Date    CHOL 110 03/17/2014    TRIG 73 03/17/2014    HDL 35 (L) 03/17/2014    LDLDIRECT 61 03/17/2014     Lab Results   Component Value Date    ALT 15 12/03/2021    AST 17 12/03/2021     TSH:  No results found for: TSH    Vitals:    05/09/22 1050   BP: (!) 130/52   Pulse:    SpO2:        Echo:12/21/20  Left ventricular function is normal, EF is estimated at 55-60%. Mild left ventricular hypertrophy. Normal diastolic filling pattern for age. Mildly dilated left atrium. Mild mitral regurgitation is present. RVSP is 12 mmHg. No evidence of pericardial effusion. Stress Test:4/6/19  No ischemia      The ASCVD Risk score (Master Partida, et al., 2013) failed to calculate for the following reasons:    Cannot find a previous HDL lab    Cannot find a previous total cholesterol lab      Assessment/ Plan:   CAD (coronary artery disease)   -PTCA in 1993 and 1994.   Stress test in 2019 showed no ischemia.     Primary and secondary prevention discussed with patient:              -Low sodium cardiac diet              -exercise 30 min a day three days a week  Continue current medications, fenofibrate, aspirin, atorvastatin, lisinopril. Carotid Bruit, left  -US in 2018 showed mild disease.      Essential hypertension  -Stable, continue with lisinopril 5 mg daily     Dyslipidemia  -At or near goal yes, LDL 58   -He is to continue current medications (Lipitor 10 mg and fenofibrate 145 mg) Hepatic function panel WNL. No abdominal pain. No myalgias.      -The nature of cardiac risk has been fully discussed with this patient. I have made him aware of his LDL target goal given his cardiovascular risk analysis. I have discussed the appropriate diet. The need for lifelong compliance in order to reduce risk is stressed. A regular exercise program is recommended to help achieve and maintain normal body weight, fitness and improve lipid balance. A written copy of a low fat, low cholesterol diet has been given to the patient.        Patient seen, interviewed and examined. Testing was reviewed. Patient is encouraged to exercise even a brisk walk for 30 minutes at least 3 to 4 times a week. Lifestyle and risk factor modificatons discussed. Various goals are discussed and questions answered. Continue current medications. Appropriate prescriptions are addressed. Questions answered and patient verbalizes understanding. Call for any problems, questions, or concerns. Pt is to follow up in 6 months for Cardiac management    Electronically signed by Ailin Dunham.  ANNABELLA Myers CNP on 5/9/2022 at 11:04 AM

## 2022-05-09 NOTE — PATIENT INSTRUCTIONS
Please be informed that if you contact our office outside of normal business hours the physician on call cannot help with any scheduling or rescheduling issues, procedure instruction questions or any type of medication issue. We advise you for any urgent/emergency that you go to the nearest emergency room! PLEASE CALL OUR OFFICE DURING NORMAL BUSINESS HOURS    Monday - Friday   8 am to 5 pm    [place]: Sariah 12: [phone]    [place]:  [phone]    **It is YOUR responsibilty to bring medication bottles and/or updated medication list to [address] [place] [address].  This will allow us to better serve you and all your healthcare needs**

## 2022-05-11 NOTE — PROGRESS NOTES
Patient Name: Sandy Marion  Patient : 1945  Patient MRN: [id number]     Primary Oncologist: Christiano Berry MD  Referring Provider: Blaze Daily MD     Date of Service: 6/10/2022      Chief Complaint:   Chief Complaint   Patient presents with    Follow-up     He came in for follow-up visit. Patient Active Problem List:     Edema     CAD (coronary artery disease)     Diabetes mellitus      Right inguinal hernia     Anemia, unspecified     Iron deficiency anemia secondary to blood loss (chronic)     Anemia of chronic disorder     Dyslipidemia     Essential hypertension     HPI:   Verito Costello is a pleasant 74-year-old male patient whom I have been following for chronic anemia since 2009. He had GI workup, including colonoscopy, EGD, capsule endoscopic study in  by Dr. Cherelle Thomas. He was found to have diverticulosis. Bone marrow study in 2009 showed trilineage hematopoietic marrow, which appeared normocellular for his age. Iron stains were adequate. FISH for MDS was negative. Flow cytometry showed no monoclonality. He was found to have mild leukopenia. He had surgery on his right toe in 2013 and was hospitalized in 2013 with cellulitis to right lower extremity. He was treated with antibiotic. MRI of the right lower extremity at the time showed findings compatible with cellulitis. Blood test in 2014 showed white cell count of 5.1, hemoglobin 11.3, platelet 828, iron was 96, TIBC 369, transferrin saturation 26, ferritin 105. He had stool guaiac by Dr. Emmy Masy in May 2014. He had umbilical hernia surgery in 2014 by Dr. Niru Carrillo. Blood test in 2014 showed white cell count 4.8, hemoglobin 11.4, platelet 414. Iron was 95, ferritin 102, transferrin saturation 31. Thyroid functions were within normal limits. He had colonoscopy in March or 2015. Reportedly, he could have slight inflammation to his colon.   He had his stool checked for C. diff, which was negative. Blood test in August 2015 showed white cell count 4.9, hemoglobin 10.9, platelet 664. Ferritin was 92. Blood test on February 10, 2016, revealed white cell count 4.5, hemoglobin 10.7, hematocrit 33.5, platelet 673, ferritin 96, stable. He stopped taking ASA for three months. Stool guaiac times three in February 2016 were negative. He restarted iron pill on August 8, 2016. Blood test on August 8, 2016, revealed white cell count 4.7, hemoglobin 10.2, platelet 048. Ferritin was 73. From my point of view, he can take his iron pill twice a day. Blood test improved on February 3, 2017, with hemoglobin 11.4. White cell was 4.3, platelet 426. Ferritin was 89, B12 218, with normal methylmalonic acid. I recommended he also take B12 supplements orally. Blood test result was reviewed and he was recommended to take B12 supplements 1,000 mcg a day over the counter. He has been taking this since August 2017. Blood tests in November 2017:  intrinsic factor antibody was negative. Methylmalonic acid, B12 were within normal limits. TSH was within normal limits. CBC   He is a . Blood tests in May 2019 reviewed. Hg dropped slightly. ECHO and stress test in May [address] were OK. Labs in November 2019 were stable and reviewed. I will defer bone marrow study. He takes B-complex, B-12 pill and Iron pill. He takes 81 mg ASA. Blood test in May 2020 was stable to him. CBC on November 13, 2020 showed WBC 5.7, hemoglobin 12.2, platelet 938. He had the Covid vaccine. He had colonoscopy in 2020 and reportedly there was no polyp. He also had capsule endoscopic study. Recent labs in May 2021 were reviewed. Ferritin was 93, WBC 6.0, hemoglobin 10.7, MCV 94.2, platelet 134.   Review of labs from December 3, 2021 revealed WBC 5.4, hemoglobin 10.7, hematocrit 32.9, MCV 93.2, platelets 657AHL grossly unremarkable, ferritin 106, iron 85 TIBC 340, transferrin percent 25 vitamin B12 608, folate 14.7. He takes iron pill daily as well as baby aspirin. On 6/10/2022 he came in for follow up visit. In June 2022 Hg 10.7, MCV 96.3. Ferritin 85, Folate >20 and B-12 750.5. He takes daily Iron pill and ASA. He is agreeable to monitor of CBC and Iron study. No acute pain. Denied any nausea, vomiting or diarrhea. No fever or chills. No chest pain, shortness of breath or palpitation. No headache or dizzy spell. No specific bone pain. No melena or hematochezia. Denied any dysuria or hematuria. PAST MEDICAL HISTORY:  Heart disease, chronic arthritis, diabetes. Chronic anemia. He had surgery on his nose in February 2020. FAMILY HISTORY  No blood disorder in the family. No history of malignancy. SOCIAL HISTORY:   He quit smoking in 1993. No history of alcohol abuse. Denied illicit drug use. LABORATORY STUDIES:   Labs in August 2012 showed white cell count 4.4, hemoglobin 11.4, hematocrit 32.1, platelet 428,793. Ferritin 63, iron 69, TIBC 362, transferrin saturation 19 percent. May 2013:  Ferritin 56, iron 68, iron-binding 361, transferrin saturation 19 percent, B12 324. White cell remains stable at 4.2, hemoglobin 11.8, hematocrit 34.5, platelet 696. Review of Systems: \"Per interval history; otherwise 10 point ROS is negative. \"     Vital Signs:  BP (!) 120/58 (Site: Left Upper Arm, Position: Sitting, Cuff Size: Medium Adult)   Pulse 62   Temp 97.3 °F (36.3 °C) (Temporal)   Resp 16   Ht 5' 8\" (1.727 m)   Wt 157 lb 12.8 oz (71.6 kg)   SpO2 99%   BMI 23.99 kg/m²     Physical Exam:  CONSTITUTIONAL: awake, alert, cooperative, no apparent distress   EYES: pupils equal & round, sclera clear and + pallor   ENT: Normocephalic, without obvious abnormality, atraumatic  NECK: supple, symmetrical, no jugular venous distension and no carotid bruits   HEMATOLOGIC/LYMPHATIC: no cervical, supraclavicular or axillary lymphadenopathy   LUNGS: no increased work of breathing and clear to auscultation   CARDIOVASCULAR: regular rate and rhythm, normal S1 and S2, no murmur noted  ABDOMEN: normal bowel sounds, soft, non-distended, non-tender, no masses palpated, no hepatosplenomegaly   MUSCULOSKELETAL: full range of motion noted, tone is normal  NEUROLOGIC: Motor skills grossly intact. CN II - XII grossly intact  SKIN: Normal skin color, texture, turgor and no jaundice. appears intact   EXTREMITIES: no LE edema. No cyanosis.      Labs:  Hematology:  Lab Results   Component Value Date    WBC 4.7 06/03/2022    RBC 3.48 (L) 06/03/2022    HGB 10.7 (L) 06/03/2022    HCT 33.5 (L) 06/03/2022    MCV 96.3 06/03/2022    MCH 30.7 06/03/2022    MCHC 31.9 (L) 06/03/2022    RDW 14.4 06/03/2022     06/03/2022    MPV 9.8 06/03/2022    SEGSPCT 52.7 06/03/2022    EOSRELPCT 2.6 06/03/2022    BASOPCT 0.4 06/03/2022    LYMPHOPCT 33.8 06/03/2022    MONOPCT 10.5 (H) 06/03/2022    SEGSABS 2.5 06/03/2022    EOSABS 0.1 06/03/2022    BASOSABS 0.0 06/03/2022    LYMPHSABS 1.6 06/03/2022    MONOSABS 0.5 06/03/2022    DIFFTYPE AUTOMATED DIFFERENTIAL 06/03/2022     Lab Results   Component Value Date    ESR 26 (H) 12/20/2013     Chemistry:  Lab Results   Component Value Date     06/03/2022    K 4.4 06/03/2022     06/03/2022    CO2 24 06/03/2022    BUN 16 06/03/2022    CREATININE 1.1 06/03/2022    GLUCOSE 169 (H) 06/03/2022    CALCIUM 9.1 06/03/2022    PROT 6.5 06/03/2022    LABALBU 4.1 06/03/2022    BILITOT 0.2 06/03/2022    ALKPHOS 44 06/03/2022    AST 17 06/03/2022    ALT 13 06/03/2022    LABGLOM >60 06/03/2022    GFRAA >60 06/03/2022    POCGLU 139 (H) 12/25/2013     Lab Results   Component Value Date    MMA 0.25 11/09/2017     Lab Results   Component Value Date    TSHHS 0.961 11/09/2017    T4FREE 1.15 11/12/2014     Immunology:  Lab Results   Component Value Date    PROT 6.5 06/03/2022     Coagulation Panel:  Lab Results   Component Value Date    PROTIME 11.8 12/20/2013    INR 1.09 12/20/2013    APTT 24.5 12/20/2013     Anemia Panel:  Lab Results   Component Value Date    HEFGZLYQ32 750.5 06/03/2022    FOLATE >20.0 (H) 06/03/2022     Tumor Markers:  Lab Results   Component Value Date    PSA 0.67 12/14/2013      Observations:  PHQ-9 Total Score: 0 (6/10/2022 11:18 AM)     Assessment & Plan:  1. He has mild anemia. He could have a low-grade myelodysplasia and iron deficiency. He had colonoscopy in March or April 2015 by Dr. Katharina Strong. B-12, methylmalonic acid were within normal limits. Blood test in May was reviewed. Hg dropped slightly. Labs in November 2019 were reviewed and stable. Blood test in May 2020 was stable to him. CBC in November 2020 was stable. Labs in May 2021 were reviewed and stable. Labs in June 2022 were stable for him. We will continue to monitor CBC and Iron study. 2. I advised him to keep his age-appropriate cancer screening up to date. He had colonoscopy in 2020 which reportedly was negative for polyp. He had colonoscopy in February 2022 with polyp removal. 5 years follow up. He started on Creon by Terri BERNAL. We discussed about healthy diet and lifestyle. Donna Carballo He had both Covid vaccine    RTC in six months or sooner. All of his questions have been answered for today. Recent imaging and labs were reviewed and discussed with the patient.

## 2022-06-03 ENCOUNTER — HOSPITAL ENCOUNTER (OUTPATIENT)
Dept: INFUSION THERAPY | Age: 77
Discharge: HOME | End: 2022-06-03
Payer: MEDICARE

## 2022-06-03 DIAGNOSIS — D64.9 ANEMIA, UNSPECIFIED TYPE: ICD-10-CM

## 2022-06-03 DIAGNOSIS — D50.0 IRON DEFICIENCY ANEMIA SECONDARY TO BLOOD LOSS (CHRONIC): ICD-10-CM

## 2022-06-03 LAB
ALBUMIN SERPL-MCNC: 4.1 GM/DL (ref 3.4–5)
ALP BLD-CCNC: 44 IU/L (ref 40–129)
ALT SERPL-CCNC: 13 U/L (ref 10–40)
ANION GAP SERPL CALCULATED.3IONS-SCNC: 11 MMOL/L (ref 4–16)
AST SERPL-CCNC: 17 IU/L (ref 15–37)
BASOPHILS ABSOLUTE: 0 K/CU MM
BASOPHILS RELATIVE PERCENT: 0.4 % (ref 0–1)
BILIRUB SERPL-MCNC: 0.2 MG/DL (ref 0–1)
BUN BLDV-MCNC: 16 MG/DL (ref 6–23)
CALCIUM SERPL-MCNC: 9.1 MG/DL (ref 8.3–10.6)
CHLORIDE BLD-SCNC: 103 MMOL/L (ref 99–110)
CO2: 24 MMOL/L (ref 21–32)
CREAT SERPL-MCNC: 1.1 MG/DL (ref 0.9–1.3)
DIFFERENTIAL TYPE: ABNORMAL
EOSINOPHILS ABSOLUTE: 0.1 K/CU MM
EOSINOPHILS RELATIVE PERCENT: 2.6 % (ref 0–3)
FERRITIN: 85 NG/ML (ref 30–400)
FOLATE: >20 NG/ML (ref 3.1–17.5)
GFR AFRICAN AMERICAN: >60 ML/MIN/1.73M2
GFR NON-AFRICAN AMERICAN: >60 ML/MIN/1.73M2
GLUCOSE BLD-MCNC: 169 MG/DL (ref 70–99)
HCT VFR BLD CALC: 33.5 % (ref 42–52)
HEMOGLOBIN: 10.7 GM/DL (ref 13.5–18)
IRON: 57 UG/DL (ref 59–158)
LYMPHOCYTES ABSOLUTE: 1.6 K/CU MM
LYMPHOCYTES RELATIVE PERCENT: 33.8 % (ref 24–44)
MCH RBC QN AUTO: 30.7 PG (ref 27–31)
MCHC RBC AUTO-ENTMCNC: 31.9 % (ref 32–36)
MCV RBC AUTO: 96.3 FL (ref 78–100)
MONOCYTES ABSOLUTE: 0.5 K/CU MM
MONOCYTES RELATIVE PERCENT: 10.5 % (ref 0–4)
PCT TRANSFERRIN: 17 % (ref 10–44)
PDW BLD-RTO: 14.4 % (ref 11.7–14.9)
PLATELET # BLD: 198 K/CU MM (ref 140–440)
PMV BLD AUTO: 9.8 FL (ref 7.5–11.1)
POTASSIUM SERPL-SCNC: 4.4 MMOL/L (ref 3.5–5.1)
RBC # BLD: 3.48 M/CU MM (ref 4.6–6.2)
SEGMENTED NEUTROPHILS ABSOLUTE COUNT: 2.5 K/CU MM
SEGMENTED NEUTROPHILS RELATIVE PERCENT: 52.7 % (ref 36–66)
SODIUM BLD-SCNC: 138 MMOL/L (ref 135–145)
TOTAL IRON BINDING CAPACITY: 327 UG/DL (ref 250–450)
TOTAL PROTEIN: 6.5 GM/DL (ref 6.4–8.2)
UNSATURATED IRON BINDING CAPACITY: 270 UG/DL (ref 110–370)
VITAMIN B-12: 750.5 PG/ML (ref 211–911)
WBC # BLD: 4.7 K/CU MM (ref 4–10.5)

## 2022-06-03 PROCEDURE — 36415 COLL VENOUS BLD VENIPUNCTURE: CPT

## 2022-06-03 PROCEDURE — 82746 ASSAY OF FOLIC ACID SERUM: CPT

## 2022-06-03 PROCEDURE — 82728 ASSAY OF FERRITIN: CPT

## 2022-06-03 PROCEDURE — 83550 IRON BINDING TEST: CPT

## 2022-06-03 PROCEDURE — 82607 VITAMIN B-12: CPT

## 2022-06-03 PROCEDURE — 80053 COMPREHEN METABOLIC PANEL: CPT

## 2022-06-03 PROCEDURE — 83540 ASSAY OF IRON: CPT

## 2022-06-03 PROCEDURE — 85025 COMPLETE CBC W/AUTO DIFF WBC: CPT

## 2022-06-10 ENCOUNTER — HOSPITAL ENCOUNTER (OUTPATIENT)
Dept: INFUSION THERAPY | Age: 77
Discharge: HOME | End: 2022-06-10

## 2022-06-10 ENCOUNTER — OFFICE VISIT (OUTPATIENT)
Dept: ONCOLOGY | Age: 77
End: 2022-06-10
Payer: MEDICARE

## 2022-06-10 VITALS
SYSTOLIC BLOOD PRESSURE: 120 MMHG | HEIGHT: 68 IN | HEART RATE: 62 BPM | WEIGHT: 157.8 LBS | TEMPERATURE: 97.3 F | RESPIRATION RATE: 16 BRPM | BODY MASS INDEX: 23.92 KG/M2 | OXYGEN SATURATION: 99 % | DIASTOLIC BLOOD PRESSURE: 58 MMHG

## 2022-06-10 DIAGNOSIS — D64.9 ANEMIA, UNSPECIFIED TYPE: Primary | ICD-10-CM

## 2022-06-10 PROCEDURE — 1123F ACP DISCUSS/DSCN MKR DOCD: CPT | Performed by: INTERNAL MEDICINE

## 2022-06-10 PROCEDURE — G8420 CALC BMI NORM PARAMETERS: HCPCS | Performed by: INTERNAL MEDICINE

## 2022-06-10 PROCEDURE — 1036F TOBACCO NON-USER: CPT | Performed by: INTERNAL MEDICINE

## 2022-06-10 PROCEDURE — 3017F COLORECTAL CA SCREEN DOC REV: CPT | Performed by: INTERNAL MEDICINE

## 2022-06-10 PROCEDURE — 99213 OFFICE O/P EST LOW 20 MIN: CPT | Performed by: INTERNAL MEDICINE

## 2022-06-10 PROCEDURE — G8427 DOCREV CUR MEDS BY ELIG CLIN: HCPCS | Performed by: INTERNAL MEDICINE

## 2022-06-10 ASSESSMENT — PATIENT HEALTH QUESTIONNAIRE - PHQ9
SUM OF ALL RESPONSES TO PHQ9 QUESTIONS 1 & 2: 0
SUM OF ALL RESPONSES TO PHQ QUESTIONS 1-9: 0
2. FEELING DOWN, DEPRESSED OR HOPELESS: 0
1. LITTLE INTEREST OR PLEASURE IN DOING THINGS: 0
SUM OF ALL RESPONSES TO PHQ QUESTIONS 1-9: 0

## 2022-06-10 NOTE — PROGRESS NOTES
MA Rooming Questions  Patient: Jefferson Sorensen  MRN: [id number]    Date: 6/10/2022        1. Do you have any new issues?   no         2. Do you need any refills on medications?    no    3. Have you had any imaging done since your last visit?   no    4. Have you been hospitalized or seen in the emergency room since your last visit here?   no    5. Did the patient have a depression screening completed today?  Yes    PHQ-9 Total Score: 0 (6/10/2022 11:18 AM)       PHQ-9 Given to (if applicable):               PHQ-9 Score (if applicable):                     [] Positive     []  Negative              Does question #9 need addressed (if applicable)                     [] Yes    []  No               Berdie Marrow, CMA

## 2022-06-26 ENCOUNTER — APPOINTMENT (OUTPATIENT)
Dept: GENERAL RADIOLOGY | Age: 77
DRG: 309 | End: 2022-06-26
Payer: MEDICARE

## 2022-06-26 ENCOUNTER — HOSPITAL ENCOUNTER (INPATIENT)
Age: 77
LOS: 2 days | Discharge: HOME | DRG: 309 | End: 2022-06-28
Attending: INTERNAL MEDICINE | Admitting: INTERNAL MEDICINE
Payer: MEDICARE

## 2022-06-26 ENCOUNTER — APPOINTMENT (OUTPATIENT)
Dept: CT IMAGING | Age: 77
DRG: 309 | End: 2022-06-26
Payer: MEDICARE

## 2022-06-26 DIAGNOSIS — D63.8 ANEMIA OF CHRONIC DISORDER: ICD-10-CM

## 2022-06-26 DIAGNOSIS — N17.9 AKI (ACUTE KIDNEY INJURY) (HCC): ICD-10-CM

## 2022-06-26 DIAGNOSIS — R55 NEAR SYNCOPE: Primary | ICD-10-CM

## 2022-06-26 DIAGNOSIS — R42 LIGHTHEADED: ICD-10-CM

## 2022-06-26 LAB
ALBUMIN SERPL-MCNC: 4.1 GM/DL (ref 3.4–5)
ALP BLD-CCNC: 52 IU/L (ref 40–129)
ALT SERPL-CCNC: 11 U/L (ref 10–40)
ANION GAP SERPL CALCULATED.3IONS-SCNC: 9 MMOL/L (ref 4–16)
AST SERPL-CCNC: 18 IU/L (ref 15–37)
BASOPHILS ABSOLUTE: 0 K/CU MM
BASOPHILS RELATIVE PERCENT: 0.7 % (ref 0–1)
BILIRUB SERPL-MCNC: 0.4 MG/DL (ref 0–1)
BUN BLDV-MCNC: 21 MG/DL (ref 6–23)
CALCIUM SERPL-MCNC: 9.2 MG/DL (ref 8.3–10.6)
CHLORIDE BLD-SCNC: 104 MMOL/L (ref 99–110)
CO2: 26 MMOL/L (ref 21–32)
CREAT SERPL-MCNC: 1.4 MG/DL (ref 0.9–1.3)
DIFFERENTIAL TYPE: ABNORMAL
EOSINOPHILS ABSOLUTE: 0 K/CU MM
EOSINOPHILS RELATIVE PERCENT: 0.7 % (ref 0–3)
GFR AFRICAN AMERICAN: 60 ML/MIN/1.73M2
GFR NON-AFRICAN AMERICAN: 50 ML/MIN/1.73M2
GLUCOSE BLD-MCNC: 172 MG/DL (ref 70–99)
HCT VFR BLD CALC: 33.4 % (ref 42–52)
HEMOGLOBIN: 10.7 GM/DL (ref 13.5–18)
IMMATURE NEUTROPHIL %: 0.2 % (ref 0–0.43)
LYMPHOCYTES ABSOLUTE: 1.3 K/CU MM
LYMPHOCYTES RELATIVE PERCENT: 22.5 % (ref 24–44)
MAGNESIUM: 1.8 MG/DL (ref 1.8–2.4)
MCH RBC QN AUTO: 30.7 PG (ref 27–31)
MCHC RBC AUTO-ENTMCNC: 32 % (ref 32–36)
MCV RBC AUTO: 95.7 FL (ref 78–100)
MONOCYTES ABSOLUTE: 0.7 K/CU MM
MONOCYTES RELATIVE PERCENT: 12 % (ref 0–4)
NUCLEATED RBC %: 0 %
PDW BLD-RTO: 13.9 % (ref 11.7–14.9)
PLATELET # BLD: 175 K/CU MM (ref 140–440)
PMV BLD AUTO: 10.3 FL (ref 7.5–11.1)
POTASSIUM SERPL-SCNC: 4.1 MMOL/L (ref 3.5–5.1)
PRO-BNP: 1317 PG/ML
RBC # BLD: 3.49 M/CU MM (ref 4.6–6.2)
SEGMENTED NEUTROPHILS ABSOLUTE COUNT: 3.6 K/CU MM
SEGMENTED NEUTROPHILS RELATIVE PERCENT: 63.9 % (ref 36–66)
SODIUM BLD-SCNC: 139 MMOL/L (ref 135–145)
TOTAL CK: 79 IU/L (ref 38–174)
TOTAL IMMATURE NEUTOROPHIL: 0.01 K/CU MM
TOTAL NUCLEATED RBC: 0 K/CU MM
TOTAL PROTEIN: 6.9 GM/DL (ref 6.4–8.2)
TROPONIN T: <0.01 NG/ML
TROPONIN T: <0.01 NG/ML
WBC # BLD: 5.7 K/CU MM (ref 4–10.5)

## 2022-06-26 PROCEDURE — 70450 CT HEAD/BRAIN W/O DYE: CPT

## 2022-06-26 PROCEDURE — 80053 COMPREHEN METABOLIC PANEL: CPT

## 2022-06-26 PROCEDURE — 83880 ASSAY OF NATRIURETIC PEPTIDE: CPT

## 2022-06-26 PROCEDURE — 99223 1ST HOSP IP/OBS HIGH 75: CPT | Performed by: INTERNAL MEDICINE

## 2022-06-26 PROCEDURE — 36415 COLL VENOUS BLD VENIPUNCTURE: CPT

## 2022-06-26 PROCEDURE — 6360000002 HC RX W HCPCS: Performed by: NURSE PRACTITIONER

## 2022-06-26 PROCEDURE — 82550 ASSAY OF CK (CPK): CPT

## 2022-06-26 PROCEDURE — 85025 COMPLETE CBC W/AUTO DIFF WBC: CPT

## 2022-06-26 PROCEDURE — 2580000003 HC RX 258: Performed by: NURSE PRACTITIONER

## 2022-06-26 PROCEDURE — 83735 ASSAY OF MAGNESIUM: CPT

## 2022-06-26 PROCEDURE — 99285 EMERGENCY DEPT VISIT HI MDM: CPT

## 2022-06-26 PROCEDURE — 2140000000 HC CCU INTERMEDIATE R&B

## 2022-06-26 PROCEDURE — 84484 ASSAY OF TROPONIN QUANT: CPT

## 2022-06-26 PROCEDURE — 71045 X-RAY EXAM CHEST 1 VIEW: CPT

## 2022-06-26 PROCEDURE — 93005 ELECTROCARDIOGRAM TRACING: CPT | Performed by: PHYSICIAN ASSISTANT

## 2022-06-26 RX ORDER — FENOFIBRATE 54 MG/1
145 TABLET ORAL DAILY
Status: DISCONTINUED | OUTPATIENT
Start: 2022-06-26 | End: 2022-06-28 | Stop reason: HOSPADM

## 2022-06-26 RX ORDER — POLYETHYLENE GLYCOL 3350 17 G/17G
17 POWDER, FOR SOLUTION ORAL DAILY PRN
Status: DISCONTINUED | OUTPATIENT
Start: 2022-06-26 | End: 2022-06-28 | Stop reason: HOSPADM

## 2022-06-26 RX ORDER — SODIUM CHLORIDE 9 MG/ML
INJECTION, SOLUTION INTRAVENOUS PRN
Status: DISCONTINUED | OUTPATIENT
Start: 2022-06-26 | End: 2022-06-28 | Stop reason: HOSPADM

## 2022-06-26 RX ORDER — ATORVASTATIN CALCIUM 10 MG/1
10 TABLET, FILM COATED ORAL DAILY
Status: DISCONTINUED | OUTPATIENT
Start: 2022-06-26 | End: 2022-06-28 | Stop reason: HOSPADM

## 2022-06-26 RX ORDER — SODIUM CHLORIDE 0.9 % (FLUSH) 0.9 %
5-40 SYRINGE (ML) INJECTION EVERY 12 HOURS SCHEDULED
Status: DISCONTINUED | OUTPATIENT
Start: 2022-06-26 | End: 2022-06-28 | Stop reason: HOSPADM

## 2022-06-26 RX ORDER — SODIUM CHLORIDE 0.9 % (FLUSH) 0.9 %
5-40 SYRINGE (ML) INJECTION PRN
Status: DISCONTINUED | OUTPATIENT
Start: 2022-06-26 | End: 2022-06-28 | Stop reason: HOSPADM

## 2022-06-26 RX ORDER — ENOXAPARIN SODIUM 100 MG/ML
1 INJECTION SUBCUTANEOUS 2 TIMES DAILY
Status: DISCONTINUED | OUTPATIENT
Start: 2022-06-26 | End: 2022-06-27

## 2022-06-26 RX ORDER — ENOXAPARIN SODIUM 100 MG/ML
40 INJECTION SUBCUTANEOUS DAILY
Status: CANCELLED | OUTPATIENT
Start: 2022-06-26

## 2022-06-26 RX ORDER — ONDANSETRON 2 MG/ML
4 INJECTION INTRAMUSCULAR; INTRAVENOUS EVERY 6 HOURS PRN
Status: DISCONTINUED | OUTPATIENT
Start: 2022-06-26 | End: 2022-06-28 | Stop reason: HOSPADM

## 2022-06-26 RX ORDER — ONDANSETRON 4 MG/1
4 TABLET, ORALLY DISINTEGRATING ORAL EVERY 8 HOURS PRN
Status: DISCONTINUED | OUTPATIENT
Start: 2022-06-26 | End: 2022-06-28 | Stop reason: HOSPADM

## 2022-06-26 RX ORDER — SODIUM CHLORIDE 9 MG/ML
INJECTION, SOLUTION INTRAVENOUS CONTINUOUS
Status: DISCONTINUED | OUTPATIENT
Start: 2022-06-26 | End: 2022-06-26

## 2022-06-26 RX ORDER — ASPIRIN 81 MG/1
81 TABLET ORAL DAILY
Status: DISCONTINUED | OUTPATIENT
Start: 2022-06-26 | End: 2022-06-28 | Stop reason: HOSPADM

## 2022-06-26 RX ORDER — FERROUS SULFATE 325(65) MG
325 TABLET ORAL
Status: DISCONTINUED | OUTPATIENT
Start: 2022-06-27 | End: 2022-06-28 | Stop reason: HOSPADM

## 2022-06-26 RX ORDER — ACETAMINOPHEN 650 MG/1
650 SUPPOSITORY RECTAL EVERY 6 HOURS PRN
Status: DISCONTINUED | OUTPATIENT
Start: 2022-06-26 | End: 2022-06-28 | Stop reason: HOSPADM

## 2022-06-26 RX ORDER — SODIUM CHLORIDE 9 MG/ML
INJECTION, SOLUTION INTRAVENOUS CONTINUOUS
Status: ACTIVE | OUTPATIENT
Start: 2022-06-26 | End: 2022-06-27

## 2022-06-26 RX ORDER — ACETAMINOPHEN 325 MG/1
650 TABLET ORAL EVERY 6 HOURS PRN
Status: DISCONTINUED | OUTPATIENT
Start: 2022-06-26 | End: 2022-06-28 | Stop reason: HOSPADM

## 2022-06-26 RX ADMIN — ENOXAPARIN SODIUM 70 MG: 100 INJECTION SUBCUTANEOUS at 20:44

## 2022-06-26 RX ADMIN — SODIUM CHLORIDE, PRESERVATIVE FREE 10 ML: 5 INJECTION INTRAVENOUS at 20:45

## 2022-06-26 RX ADMIN — SODIUM CHLORIDE: 9 INJECTION, SOLUTION INTRAVENOUS at 17:42

## 2022-06-26 ASSESSMENT — ENCOUNTER SYMPTOMS
EYE DISCHARGE: 0
DIARRHEA: 1
SORE THROAT: 0
VOMITING: 0
VOICE CHANGE: 0
RHINORRHEA: 0
SHORTNESS OF BREATH: 0
NAUSEA: 0
EYE REDNESS: 0
COUGH: 0
CONSTIPATION: 0
ABDOMINAL PAIN: 0
CHEST TIGHTNESS: 0

## 2022-06-26 ASSESSMENT — PAIN SCALES - GENERAL
PAINLEVEL_OUTOF10: 0
PAINLEVEL_OUTOF10: 0

## 2022-06-26 NOTE — ED PROVIDER NOTES
EKG Interpretation    Interpreted by emergency department physician    Rhythm: atrial fibrillation - controlled  Rate: normal  Axis: normal  Ectopy: none  Conduction: normal  ST Segments: nonspecific changes  T Waves: non specific changes  Q Waves: none    Clinical Impression: Rate controlled atrial fibrillation with nonspecific ST and T wave abnormality    MD Danette Rosado MD  06/26/22 9698

## 2022-06-26 NOTE — H&P
V2.0  History and Physical      Name:  Clementina Bryant /Age/Sex: 1945  (78 y.o. male)   MRN & CSN:  [id number] & [id number] Encounter Date/Time: 2022 2:56 PM EDT   Location:  ED29/ED-29 PCP: Celeste Hernandez Day: 1    Assessment and Plan:   Clementina Bryant is a 78 y.o. male with a pmh of DMII, CAD s/p PTCA, Arrhythmia, Essential HTN, HLD, chronic diarrhea,  ANGLE who presents with near syncope while at [employer]    Near Syncope likely cardiac related  Recently dx with A-fib. EKG consistent with stable A-fib. AAMIR score: 3.   -Continuous telemetry monitoring  -Fall precautions  -Echo in AM  -Bilateral carotid US in am  -Consider Holter monitor  -Trend troponin  -Check orthostatic blood pressure and pulse  -Adjust blood pressure medications   -Hold Lyrica  -Start Lovenox 1mg/kg BID and BB  -Cardiology consulted in ED, Tilt table in am    Mild KASSY  Creat 1.4 and GFR 50  -Hydration  -Lisinopril and Metformin held  -Avoid nephrotoxins agents  -Monitor BMP    CAD s/p PTCA ()  -Continue ASA and Lipitor  -Was on Coreg in the past   -Start Metoprolol    DMII without chronic complications   Last O6F 7.4 ( 3/2022)  -Hold Metformin  -SSI and hypoglycemia protocol    Essential HTN  -Hold BP medications per cardiology recommendation      ANGLE   Stable at 10.7. PT sees Dr. Oneida Eller  -Continue iron tablet    HLD  -Continue Fenofibrate and Lipitor      Disposition:   Current Living situation: Home  Expected Disposition: Home  Estimated D/C: 22    Diet Cardiac   DVT Prophylaxis [x] Lovenox, []  Heparin, [] SCDs, [] Ambulation,  [] Eliquis, [] Xarelto   Code Status Prior   Surrogate Decision Maker/ POA Self     History from:     Patient    History of Present Illness:     Chief Complaint: Near syncope  Clementina Bryant is a 78 y.o. male with pmh of DMII, CAD s/p PTCA, Arrhythmia, Essential HTN, HLD, chronic diarrhea, ANGLE who presents with near syncope while at [employer].  Patient states he was at [facility] when he started feeling dizzy. PT sat down before he passed out. He denies LOC or syncope. He was recently told that he has arrhythmia and had an echo done which was unremarkable. He had a heart attack in 1994 and was started on ASA, Statins, and Coreg, but stopped taking Coreg. He was started on Lisinopril 2 years ago and reports fatigue and dizziness ever since he started taking it. EKG in ED showed stable A-fib. Labs concerning for mild KASSY. Denies chest pain, palpitation, fever, chills, abd. Pain, N/V/D, or urinary symptoms. Would admit for hydration and cardiac evaluation. Review of Systems: Need 10 Elements   Review of Systems   Constitutional: Negative for chills, diaphoresis, fatigue and fever. HENT: Negative for congestion, rhinorrhea, sore throat and voice change. Eyes: Negative for discharge, redness and visual disturbance. Respiratory: Negative for cough, chest tightness and shortness of breath. Cardiovascular: Negative for chest pain, palpitations and leg swelling. Gastrointestinal: Positive for diarrhea. Negative for abdominal pain, constipation, nausea and vomiting. Chronic diarrhea   Endocrine: Negative for polydipsia, polyphagia and polyuria. Genitourinary: Negative for dysuria, hematuria and urgency. Musculoskeletal: Negative for joint swelling, myalgias and neck pain. Skin: Negative for pallor and wound. Allergic/Immunologic: Negative for immunocompromised state. Neurological: Positive for dizziness and light-headedness. Negative for tremors, syncope, weakness and headaches. Psychiatric/Behavioral: Negative for confusion, hallucinations and self-injury.          Objective:   No intake or output data in the 24 hours ending 06/26/22 1456   Vitals:   Vitals:    06/26/22 1200 06/26/22 1203 06/26/22 1214 06/26/22 1236   BP: (!) 108/45      Pulse: 67   67   Resp: 12      Temp:  98.5 °F (36.9 °C)     TempSrc:  Oral     SpO2: 96%      Weight:   156 lb (70.8 kg)        Medications Prior to Admission     Prior to Admission medications    Medication Sig Start Date End Date Taking? Authorizing Provider   loperamide (IMODIUM A-D) 2 MG tablet Take 1 tablet by mouth 2 times daily 3/25/22   Historical Provider, MD   bismuth subsalicylate (PEPTO BISMOL) 262 MG chewable tablet Take 262 mg by mouth 2 times daily 2 tablets twice daily 4/15/22   Historical Provider, MD   Lancets ([address]) [address]  1/3/22   Historical Provider, MD Carlson Prima strip  1/3/22   Historical Provider, MD   lisinopril (PRINIVIL;ZESTRIL) 5 MG tablet Take 1 tablet by mouth daily 1/5/22   Rebel Myers, APRN - CNP   Coenzyme Q10 (COQ-10) 30 MG CAPS Take 30 mg by mouth daily    Historical Provider, MD   fenofibrate (TRICOR) 145 MG tablet Take 145 mg by mouth daily 8/3/18   Historical Provider, MD   budesonide (ENTOCORT EC) 3 MG extended release capsule Take 3 mg by mouth daily 8/26/18   Historical Provider, MD   metFORMIN (GLUCOPHAGE-XR) 500 MG extended release tablet Take 500 mg by mouth 2 times daily (with meals)     Historical Provider, MD   b complex vitamins capsule Take 1 capsule by mouth daily    Historical Provider, MD   ferrous sulfate 325 (65 Fe) MG tablet Take 325 mg by mouth daily (with breakfast)    Historical Provider, MD   Cyanocobalamin (VITAMIN B 12 PO) Take by mouth every other day     Historical Provider, MD   pregabalin (LYRICA) 75 MG capsule Take 75 mg by mouth 3 times daily. Historical Provider, MD   aspirin 81 MG tablet Take 81 mg by mouth daily     Historical Provider, MD   atorvastatin (LIPITOR) 10 MG tablet Take 10 mg by mouth daily. Historical Provider, MD       Physical Exam: Need 8 Elements   Physical Exam  Constitutional:       Appearance: Normal appearance. HENT:      Head: Normocephalic and atraumatic. Right Ear: Tympanic membrane normal.      Left Ear: Tympanic membrane normal.      Mouth/Throat:      Mouth: Mucous membranes are dry.    Eyes:      Extraocular Movements: Extraocular movements intact. Pupils: Pupils are equal, round, and reactive to light. Cardiovascular:      Rate and Rhythm: Normal rate. Pulmonary:      Effort: Pulmonary effort is normal.      Breath sounds: Normal breath sounds. Abdominal:      General: Bowel sounds are normal.      Hernia: A hernia is present. Musculoskeletal:         General: Normal range of motion. Cervical back: Normal range of motion and neck supple. Skin:     General: Skin is warm and dry. Neurological:      Mental Status: He is alert and oriented to person, place, and time. Psychiatric:         Mood and Affect: Mood normal.         Behavior: Behavior normal.         Thought Content: Thought content normal.         Judgment: Judgment normal.          Past Medical History:   PMHx   Past Medical History:   Diagnosis Date    Abdominal Doppler 07/06/2021    Normal study    Acute MI ([facility] [address]     1993 - angioplasty, no stent, Dr. Sherlyn Patricio CAD (coronary artery disease)     Diabetes mellitus ([facility] [address]     Essential hypertension 08/18/2020    H/O cardiovascular stress test 04/09/2019    Normal study.  H/O echocardiogram 04/18/2019    EF 55-60%, Normal study.  H/O echocardiogram 12/21/2020    EF 55-60%, Mild MR & LVH. PSHX:  has a past surgical history that includes Toe Surgery; Cataract removal; Balloon angioplasty, artery (02/1994); and Cardiac catheterization. Allergies: No Known Allergies  Fam HX:  family history includes Heart Attack in his father; Heart Disease in his father.  Diabetes in his father and siblings  Soc HX:   Social History     Socioeconomic History    Marital status:      Spouse name: None    Number of children: None    Years of education: None    Highest education level: None   Occupational History    None   Tobacco Use    Smoking status: Former Smoker    Smokeless tobacco: Never Used   Substance and Sexual Activity    Alcohol use: No     Comment: Caffeine: Drinks 10 cups caffeinated coffee daily.  Drug use: No    Sexual activity: None   Other Topics Concern    None   Social History Narrative    None     Social Determinants of Health     Financial Resource Strain:     Difficulty of Paying Living Expenses: Not on file   Food Insecurity:     Worried About Running Out of Food in the Last Year: Not on file    Radha of Food in the Last Year: Not on file   Transportation Needs:     Lack of Transportation (Medical): Not on file    Lack of Transportation (Non-Medical):  Not on file   Physical Activity:     Days of Exercise per Week: Not on file    Minutes of Exercise per Session: Not on file   Stress:     Feeling of Stress : Not on file   Social Connections:     Frequency of Communication with Friends and Family: Not on file    Frequency of Social Gatherings with Friends and Family: Not on file    Attends Restoration Services: Not on file    Active Member of [address] [place] [address] or Organizations: Not on file    Attends Club or Organization Meetings: Not on file    Marital Status: Not on file   Intimate Partner Violence:     Fear of Current or Ex-Partner: Not on file    Emotionally Abused: Not on file    Physically Abused: Not on file    Sexually Abused: Not on file   Housing Stability:     Unable to Pay for Housing in the Last Year: Not on file    Number of [place] in the Last Year: Not on file    Unstable Housing in the Last Year: Not on file       Medications:   Medications:    Infusions:    sodium chloride       PRN Meds:      Labs      CBC:   Recent Labs     06/26/22  1225   WBC 5.7   HGB 10.7*        BMP:    Recent Labs     06/26/22  1225      K 4.1      CO2 26   BUN 21   CREATININE 1.4*   GLUCOSE 172*     Hepatic:   Recent Labs     06/26/22  1225   AST 18   ALT 11   BILITOT 0.4   ALKPHOS 52     Lipids:   Lab Results   Component Value Date    CHOL 110 03/17/2014    HDL 35 03/17/2014    TRIG 73 03/17/2014     Hemoglobin A1C: Lab Results   Component Value Date    LABA1C 6.6 03/17/2014     TSH: No results found for: TSH  Troponin:   Lab Results   Component Value Date    TROPONINT <0.010 06/26/2022     Lactic Acid: No results for input(s): LACTA in the last 72 hours. BNP:   Recent Labs     06/26/22  1225   PROBNP 1,317*     UA:No results found for: Umer Gross, PHUR, LABCAST, WBCUA, RBCUA, MUCUS, TRICHOMONAS, YEAST, BACTERIA, CLARITYU, SPECGRAV, LEUKOCYTESUR, UROBILINOGEN, BILIRUBINUR, BLOODU, GLUCOSEU, KETUA, AMORPHOUS  Urine Cultures: No results found for: Queen Bijou  Blood Cultures: No results found for: BC  No results found for: BLOODCULT2  Organism: No results found for: ORG    Imaging/Diagnostics Last 24 Hours   CT HEAD WO CONTRAST    Result Date: 6/26/2022  EXAMINATION: CT OF THE HEAD WITHOUT CONTRAST  6/26/2022 1:32 pm TECHNIQUE: CT of the head was performed without the administration of intravenous contrast. Automated exposure control, iterative reconstruction, and/or weight based adjustment of the mA/kV was utilized to reduce the radiation dose to as low as reasonably achievable. COMPARISON: None. HISTORY: ORDERING SYSTEM PROVIDED HISTORY: intermittent dizziness, syncope TECHNOLOGIST PROVIDED HISTORY: Has a \"code stroke\" or \"stroke alert\" been called? ->No Reason for exam:->intermittent dizziness, syncope Decision Support Exception - unselect if not a suspected or confirmed emergency medical condition->Emergency Medical Condition (MA) Reason for Exam: DIZZINESS FINDINGS: BRAIN/VENTRICLES: There is no acute intracranial hemorrhage, mass effect or midline shift. No abnormal extra-axial fluid collection. The gray-white differentiation is maintained without evidence of an acute infarct. There is no evidence of hydrocephalus. ORBITS: The visualized portion of the orbits demonstrate no acute abnormality. SINUSES: The visualized paranasal sinuses and mastoid air cells demonstrate no acute abnormality.  SOFT TISSUES/SKULL:  No acute abnormality of the visualized skull or soft tissues. No acute intracranial abnormality. XR CHEST PORTABLE    Result Date: 6/26/2022  EXAMINATION: ONE XRAY VIEW OF THE CHEST 6/26/2022 12:58 pm COMPARISON: October 14, 2021 HISTORY: ORDERING SYSTEM PROVIDED HISTORY: chest pain TECHNOLOGIST PROVIDED HISTORY: Reason for exam:->chest pain Reason for Exam: pt stated he got dizzy, Initial evaluation for acute chest pain FINDINGS: The cardiomediastinal silhouette is normal in size. The lungs are clear. No pleural effusion or pneumothorax is present. Stable granulomatous calcification is present on the right. No acute cardiopulmonary process.        Personally reviewed Lab Studies, Imaging, and discussed case with Dr. Lucrecia Thornton    Electronically signed by ANNABELLA Santo CNP on 6/26/2022 at 2:56 PM

## 2022-06-26 NOTE — ACP (ADVANCE CARE PLANNING)
Patient does not have any ACP documents/Medical Power of . LSW notes hospital will follow [place]'s Next of Kin hierarchy in the following descending order for priority:    Guardian  Spouse  [de-identified] of adult Children  Parents  [de-identified] of adult Siblings  Nearest Relative not described above  Per [place]'s Next of Kin hierarchy: Patients' spouse will be Primary Healthcare Decision Maker.

## 2022-06-26 NOTE — CONSULTS
CARDIOLOGY CONSULT NOTE   Reason for consultation: Syncope atrial fibrillation    Referring physician:  No admitting provider for patient encounter. Primary care physician: Ankush Ly MD      Dear   Thanks for the consult. History of present illness:Cricket is a 78 y. o.year old who  presents with presyncope in charge and found to be in A. fib in the ER, patient was standing in the [employer] and started to feel cold and clammy and dizzy and he sat down and then he almost passed out he was brought here by EMS patient denies any chest pain shortness of breath in the ER he was found to be in A. fib which is new for him  Chief Complaint   Patient presents with    Atrial Fibrillation     near syncope at work      Blood pressure, cholesterol, blood glucose and weight are well controlled. Past medical history:    has a past medical history of Abdominal Doppler, Acute MI ([facility] [address] Arthritis, CAD (coronary artery disease), Diabetes mellitus ([facility] [address] Essential hypertension, H/O cardiovascular stress test, H/O echocardiogram, and H/O echocardiogram.  Past surgical history:   has a past surgical history that includes Toe Surgery; Cataract removal; Balloon angioplasty, artery (02/1994); and Cardiac catheterization. Social History:   reports that he has quit smoking. He has never used smokeless tobacco. He reports that he does not drink alcohol and does not use drugs. Family history:   no family history of CAD, STROKE of DM    No Known Allergies    No current facility-administered medications for this encounter. No current facility-administered medications for this encounter.      Current Outpatient Medications   Medication Sig Dispense Refill    loperamide (IMODIUM A-D) 2 MG tablet Take 1 tablet by mouth 2 times daily      bismuth subsalicylate (PEPTO BISMOL) 262 MG chewable tablet Take 262 mg by mouth 2 times daily 2 tablets twice daily      Lancets (ONETOUCH DELICA PLUS [id number]) MISC       ONETOUCH VERIO strip  lisinopril (PRINIVIL;ZESTRIL) 5 MG tablet Take 1 tablet by mouth daily 30 tablet 2    Coenzyme Q10 (COQ-10) 30 MG CAPS Take 30 mg by mouth daily      fenofibrate (TRICOR) 145 MG tablet Take 145 mg by mouth daily      budesonide (ENTOCORT EC) 3 MG extended release capsule Take 3 mg by mouth daily      metFORMIN (GLUCOPHAGE-XR) 500 MG extended release tablet Take 500 mg by mouth 2 times daily (with meals)       b complex vitamins capsule Take 1 capsule by mouth daily      ferrous sulfate 325 (65 Fe) MG tablet Take 325 mg by mouth daily (with breakfast)      Cyanocobalamin (VITAMIN B 12 PO) Take by mouth every other day       pregabalin (LYRICA) 75 MG capsule Take 75 mg by mouth 3 times daily.  aspirin 81 MG tablet Take 81 mg by mouth daily       atorvastatin (LIPITOR) 10 MG tablet Take 10 mg by mouth daily. Review of Systems:   · Constitutional: No Fever or Weight Loss   · Eyes: No Decreased Vision  · ENT: No Headaches, Hearing Loss or Vertigo  · Cardiovascular: No chest pain, dyspnea on exertion, palpitations or loss of consciousness  · Respiratory: No cough or wheezing    · Gastrointestinal: No abdominal pain, appetite loss, blood in stools, constipation, diarrhea or heartburn  · Genitourinary: No dysuria, trouble voiding, or hematuria  · Musculoskeletal:  No gait disturbance, weakness or joint complaints  · Integumentary: No rash or pruritis  · Neurological: No TIA or stroke symptoms  · Psychiatric: No anxiety or depression  · Endocrine: No malaise, fatigue or temperature intolerance  · Hematologic/Lymphatic: No bleeding problems, blood clots or swollen lymph nodes  · Allergic/Immunologic: No nasal congestion or hives  All systems negative except as marked.      ·   ·      Physical Examination:    Vitals:    06/26/22 1236   BP: 108/45   Pulse: 67   Resp: 18   Temp: afebrile   SpO2:       Wt Readings from Last 3 Encounters:   06/26/22 156 lb (70.8 kg)   06/10/22 157 lb 12.8 oz (71.6 kg) 05/09/22 160 lb 6.4 oz (72.8 kg)     Body mass index is 23.72 kg/m². General Appearance:  No distress, conversant    Constitutional:  Well developed, Well nourished, No acute distress, Non-toxic appearance. HENT:  Normocephalic, Atraumatic, Bilateral external ears normal, Oropharynx moist, No oral exudates, Nose normal. Neck- Normal range of motion, No tenderness, Supple, No stridor,no apical-carotid delay, no carotid bruit  Eyes:  PERRL, EOMI, Conjunctiva normal, No discharge. Respiratory:  Normal breath sounds, No respiratory distress, No wheezing, No chest tenderness. ,no use of accessory muscles, diaphragm movement is normal  Cardiovascular: (PMI) apex non displaced,no lifts no thrills, no s3,no s4, Normal heart rate, Normal rhythm, No murmurs, No rubs, No gallops. Carotid arteries pulse and amplitude are normal no bruit, no abdominal bruit noted ( normal abdominal aorta ausculation), femoral arteries pulse and amplitude are normal no bruit, pedal pulses are normal  GI:  Bowel sounds normal, Soft, No tenderness, No masses, No pulsatile masses, no hepatosplenomegally, no bruits  : External genitalia appear normal, No masses or lesions. No discharge. No CVA tenderness. Musculoskeletal:  Intact distal pulses, No edema, No tenderness, No cyanosis, No clubbing. Good range of motion in all major joints. No tenderness to palpation or major deformities noted. Back- No tenderness. Integument:  Warm, Dry, No erythema, No rash. Skin: no rash, no ulcers  Lymphatic:  No lymphadenopathy noted. Neurologic:  Alert & oriented x 3, Normal motor function, Normal sensory function, No focal deficits noted.    Psychiatric:  Affect normal, Judgment normal, Mood normal.   Lab Review   Recent Labs     06/26/22  1225   WBC 5.7   HGB 10.7*   HCT 33.4*         Recent Labs     06/26/22  1225      K 4.1      CO2 26   BUN 21   CREATININE 1.4*     Recent Labs     06/26/22  1225   AST 18   ALT 11   BILITOT 0. 4   ALKPHOS 52     No results for input(s): TROPONINI in the last 72 hours. No results found for: BNP  Lab Results   Component Value Date    INR 1.09 12/20/2013    PROTIME 11.8 12/20/2013         EKG: A. fib at 71 bpm    Chest Xray:    ECHO: Normal LVEF  Labs, echo, meds reviewed  Assessment: 78 y. o.year old with PMH of  has a past medical history of Abdominal Doppler, Acute MI ([facility] [address] Arthritis, CAD (coronary artery disease), Diabetes mellitus ([facility] [address] Essential hypertension, H/O cardiovascular stress test, H/O echocardiogram, and H/O echocardiogram.      Recommendations:    1. Presyncope could be combination of A. fib and orthostatic hypotension, will admit the patient to the ED to the hospital will get tilt able test tomorrow echo and discuss for cardioversion his chads vascular score is 3 based on history of CAD hypertension and age if fall is not a risk then will start full dose anticoagulation  2. CAD he has history of PTCA in 1994 and an L3 recommend he continue aspirin and statins and metoprolol  3. Diabetes controlled continue metformin lipidemia continue statins  4. Hypertension, he used to be on Coreg also which is listed in the medication list at this time will hold off his lisinopril and will discuss about his medication adjustment based on the table test results. All labs, medications and tests reviewed, continue all other medications of all above medical condition listed as is.          Blanca Scherer MD, 6/26/2022 1:33 PM

## 2022-06-26 NOTE — ED PROVIDER NOTES
EMERGENCY DEPARTMENT ENCOUNTER    [facility] EMERGENCY DEPARTMENT        TRIAGE CHIEF COMPLAINT:   Atrial Fibrillation (near syncope at work )      [place]:  Kamron Casas is a 78 y.o. male that presents with family with concern for lightheadedness and near syncope. Onset was at 9 AM this morning. Context is patient was at [facility], [facility] and was standing when he suddenly began feeling lightheaded and like \"everything was going black I felt like I was going to pass out. \"  Had initially endorsed some dizziness but is adamant denying any spinning sensation or rocking boat sensation. He states it feels like \"I was about to float away. \"  Per family, he appeared very pale, sweating and was not responding to questions so EMS were called. At this time, he does state that his symptoms have resolved he feels back to normal.  He does state that he ate breakfast this morning. Had no associated chest pain palpitations, headache, vision loss, nausea or vomiting or palpitations. Denying any facial droop, difficulty with speech or numbness/tingling the upper or lower extremities. Wife/family bedside does state he had similar episodes several months ago while at [facility] and they have noted that he has been getting very easily dizzy and lightheaded with position changes at home especially when leaning forward during gardening. Does have a history of A. fib, is on aspirin only. Follows with Dr. Constantine Hyman at the [facility]. No recent URI cough or cold complaints, fever or chills, changes in appetite, vomiting or diarrhea. Family states patient is very hard headed and has been refusing to be evaluated for these ongoing complaints. No previous history of TIA/CVA. Medical history includes hypertension, coronary artery disease, diabetes. Patient had balloon cardiac angioplasty 1994. He is a former smoker.     ROS:  General:  No fevers, no chills, +generalized weakness  Eyes:  No recent vison changes, no discharge  ENT:  No sore throat, no nasal congestion, no hearing changes  Cardiovascular:  No chest pain, no palpitations  Respiratory:  No shortness of breath, no cough, no wheezing  Gastrointestinal:  No pain, no nausea, no vomiting, no diarrhea  Musculoskeletal:  No muscle pain, no joint pain  Skin:  No rash, no pruritis, no easy bruising  Neurologic:  See HPI  Psychiatric:  No anxiety  Genitourinary:  No dysuria, no hematuria   Extremities:  No edema    Past Medical History:   Diagnosis Date    Abdominal Doppler 07/06/2021    Normal study    [facility]     1993 - angioplasty, no stent, Dr. Otoniel Pathak CAD (coronary artery disease)     Diabetes mellitus ([facility]     Essential hypertension 08/18/2020    H/O cardiovascular stress test 04/09/2019    Normal study.  H/O echocardiogram 04/18/2019    EF 55-60%, Normal study.  H/O echocardiogram 12/21/2020    EF 55-60%, Mild MR & LVH. Past Surgical History:   Procedure Laterality Date   Candance Due, ARTERY  02/1994    CARDIAC CATHETERIZATION      CATARACT REMOVAL      TOE SURGERY       Family History   Problem Relation Age of Onset    Heart Attack Father     Heart Disease Father      Social History     Socioeconomic History    Marital status:      Spouse name: Not on file    Number of children: Not on file    Years of education: Not on file    Highest education level: Not on file   Occupational History    Not on file   Tobacco Use    Smoking status: Former Smoker    Smokeless tobacco: Never Used   Substance and Sexual Activity    Alcohol use: No     Comment: Caffeine: Drinks 10 cups caffeinated coffee daily.      Drug use: No    Sexual activity: Not on file   Other Topics Concern    Not on file   Social History Narrative    Not on file     Social Determinants of Health     Financial Resource Strain:     Difficulty of Paying Living Expenses: Not on file   Food Insecurity:     Worried About Running Out of Food in the Last Year: Not on file    Ran Out of Food in the Last Year: Not on file   Transportation Needs:     Lack of Transportation (Medical): Not on file    Lack of Transportation (Non-Medical):  Not on file   Physical Activity:     Days of Exercise per Week: Not on file    Minutes of Exercise per Session: Not on file   Stress:     Feeling of Stress : Not on file   Social Connections:     Frequency of Communication with Friends and Family: Not on file    Frequency of Social Gatherings with Friends and Family: Not on file    Attends Bahai Services: Not on file    Active Member of [employer] Group or Organizations: Not on file    Attends Club or Organization Meetings: Not on file    Marital Status: Not on file   Intimate Partner Violence:     Fear of Current or Ex-Partner: Not on file    Emotionally Abused: Not on file    Physically Abused: Not on file    Sexually Abused: Not on file   Housing Stability:     Unable to Pay for Housing in the Last Year: Not on file    Number of [place] in the Last Year: Not on file    Unstable Housing in the Last Year: Not on file     Current Facility-Administered Medications   Medication Dose Route Frequency Provider Last Rate Last Admin    0.9 % sodium chloride infusion   IntraVENous Continuous Kiko Dunaway PA-C         Current Outpatient Medications   Medication Sig Dispense Refill    loperamide (IMODIUM A-D) 2 MG tablet Take 1 tablet by mouth 2 times daily      bismuth subsalicylate (PEPTO BISMOL) 262 MG chewable tablet Take 262 mg by mouth 2 times daily 2 tablets twice daily      Lancets (ONETOUCH DELICA PLUS [id number]) MISC       ONETOUCH VERIO strip       lisinopril (PRINIVIL;ZESTRIL) 5 MG tablet Take 1 tablet by mouth daily 30 tablet 2    Coenzyme Q10 (COQ-10) 30 MG CAPS Take 30 mg by mouth daily      fenofibrate (TRICOR) 145 MG tablet Take 145 mg by mouth daily      budesonide (ENTOCORT EC) 3 MG extended release capsule Take 3 mg by mouth daily      metFORMIN (GLUCOPHAGE-XR) 500 MG extended release tablet Take 500 mg by mouth 2 times daily (with meals)       b complex vitamins capsule Take 1 capsule by mouth daily      ferrous sulfate 325 (65 Fe) MG tablet Take 325 mg by mouth daily (with breakfast)      Cyanocobalamin (VITAMIN B 12 PO) Take by mouth every other day       pregabalin (LYRICA) 75 MG capsule Take 75 mg by mouth 3 times daily.  aspirin 81 MG tablet Take 81 mg by mouth daily       atorvastatin (LIPITOR) 10 MG tablet Take 10 mg by mouth daily. No Known Allergies    Nursing Notes Reviewed  PHYSICAL EXAM    VITAL SIGNS: BP (!) 108/45   Pulse 67   Temp 98.5 °F (36.9 °C) (Oral)   Resp 12   Wt 156 lb (70.8 kg)   SpO2 96%   BMI 23.72 kg/m²    Constitutional:  Well developed, Well nourished, In no acute distress  Head:  Normocephalic, Atraumatic  Eyes: PERRL. EOMI. No nystagmus. Sclera clear. Conjunctiva normal, No discharge. Neck/Lymphatics: Supple, no JVD, No lymphadenopathy  Cardiovascular:  RRR, Normal S1 & S2    Peripheral Vascular: Distal pulses 2+, Capillary refill <2seconds  Respiratory:  Respirations nonnlabored, Clear to auscultation bilaterally, No retractions  Abdomen: Bowel sounds normal in all quadrants, Soft, Non tender/Nondistended, No palpable abdominal masses. Musculoskeletal: BUE/BLE symmetrical without atrophy or deformities. Calves supple, no pretibial pitting edema  Integument:  Warm, Dry, Intact, Skin turgor and texture normal  Neurologic:  Alert & oriented x3 , No focal deficits noted. Cranial nerves II through XII grossly intact. No slurred speech. No facial droop. Finger to nose intact, rapid alternating movements intact. Normal gross motor coordination & motor strength bilateral upper and lower extremities. Upper and Lower extremities DTRs intact. Sensation intact. Negative Rombergs. No pronator drift.  No trunkal ataxia  Psychiatric:  Affect appropriate      I have reviewed and interpreted all of the currently available lab results from this visit (if applicable):  Results for orders placed or performed during the hospital encounter of 06/26/22   CBC with Auto Differential   Result Value Ref Range    WBC 5.7 4.0 - 10.5 K/CU MM    RBC 3.49 (L) 4.6 - 6.2 M/CU MM    Hemoglobin 10.7 (L) 13.5 - 18.0 GM/DL    Hematocrit 33.4 (L) 42 - 52 %    MCV 95.7 78 - 100 FL    MCH 30.7 27 - 31 PG    MCHC 32.0 32.0 - 36.0 %    RDW 13.9 11.7 - 14.9 %    Platelets 146 081 - 746 K/CU MM    MPV 10.3 7.5 - 11.1 FL    Differential Type AUTOMATED DIFFERENTIAL     Segs Relative 63.9 36 - 66 %    Lymphocytes % 22.5 (L) 24 - 44 %    Monocytes % 12.0 (H) 0 - 4 %    Eosinophils % 0.7 0 - 3 %    Basophils % 0.7 0 - 1 %    Segs Absolute 3.6 K/CU MM    Lymphocytes Absolute 1.3 K/CU MM    Monocytes Absolute 0.7 K/CU MM    Eosinophils Absolute 0.0 K/CU MM    Basophils Absolute 0.0 K/CU MM    Nucleated RBC % 0.0 %    Total Nucleated RBC 0.0 K/CU MM    Total Immature Neutrophil 0.01 K/CU MM    Immature Neutrophil % 0.2 0 - 0.43 %   Comprehensive Metabolic Panel   Result Value Ref Range    Sodium 139 135 - 145 MMOL/L    Potassium 4.1 3.5 - 5.1 MMOL/L    Chloride 104 99 - 110 mMol/L    CO2 26 21 - 32 MMOL/L    BUN 21 6 - 23 MG/DL    CREATININE 1.4 (H) 0.9 - 1.3 MG/DL    Glucose 172 (H) 70 - 99 MG/DL    Calcium 9.2 8.3 - 10.6 MG/DL    Albumin 4.1 3.4 - 5.0 GM/DL    Total Protein 6.9 6.4 - 8.2 GM/DL    Total Bilirubin 0.4 0.0 - 1.0 MG/DL    ALT 11 10 - 40 U/L    AST 18 15 - 37 IU/L    Alkaline Phosphatase 52 40 - 129 IU/L    GFR Non- 50 (L) >60 mL/min/1.73m2    GFR  60 (L) >60 mL/min/1.73m2    Anion Gap 9 4 - 16   Troponin   Result Value Ref Range    Troponin T <0.010 <0.01 NG/ML   Brain Natriuretic Peptide   Result Value Ref Range    Pro-BNP 1,317 (H) <300 PG/ML   Magnesium   Result Value Ref Range    Magnesium 1.8 1.8 - 2.4 mg/dl   CK   Result Value Ref Range    Total CK 79 38 - 174 IU/L   EKG 12 Lead   Result Value Ref Range    Ventricular Rate 71 BPM    Atrial Rate 357 BPM    QRS Duration 90 ms    Q-T Interval 398 ms    QTc Calculation (Bazett) 432 ms    R Axis 36 degrees    T Axis 22 degrees    Diagnosis       Atrial fibrillation  Nonspecific ST and T wave abnormality  Abnormal ECG  No previous ECGs available          Radiographs (if obtained):  [] The following radiograph was interpreted by myself in the absence of a radiologist:   [] Radiologist's Report Reviewed:  CT HEAD WO CONTRAST   Final Result   No acute intracranial abnormality. XR CHEST PORTABLE   Preliminary Result   No acute cardiopulmonary process.                     CT HEAD WO CONTRAST (Final result)  Result time 06/26/22 14:09:27  Final result by Merlinda Jaeger, MD (06/26/22 14:09:27)                Impression:    No acute intracranial abnormality. Narrative:    EXAMINATION:   CT OF THE HEAD WITHOUT CONTRAST  6/26/2022 1:32 pm     TECHNIQUE:   CT of the head was performed without the administration of intravenous   contrast. Automated exposure control, iterative reconstruction, and/or weight   based adjustment of the mA/kV was utilized to reduce the radiation dose to as   low as reasonably achievable. COMPARISON:   None. HISTORY:   ORDERING SYSTEM PROVIDED HISTORY: intermittent dizziness, syncope   TECHNOLOGIST PROVIDED HISTORY:   Has a \"code stroke\" or \"stroke alert\" been called? ->No   Reason for exam:->intermittent dizziness, syncope   Decision Support Exception - unselect if not a suspected or confirmed   emergency medical condition->Emergency Medical Condition (MA)   Reason for Exam: DIZZINESS     FINDINGS:   BRAIN/VENTRICLES: There is no acute intracranial hemorrhage, mass effect or   midline shift.  No abnormal extra-axial fluid collection.  The gray-white   differentiation is maintained without evidence of an acute infarct.  There is   no evidence of hydrocephalus.      ORBITS: The visualized portion of the orbits demonstrate no acute abnormality. SINUSES: The visualized paranasal sinuses and mastoid air cells demonstrate   no acute abnormality. SOFT TISSUES/SKULL:  No acute abnormality of the visualized skull or soft   tissues.                       XR CHEST PORTABLE (Preliminary result)  Result time 06/26/22 14:02:40  Preliminary result by Ethel Fernández MD (06/26/22 14:02:40)                Impression:    No acute cardiopulmonary process. Narrative:    EXAMINATION:   ONE XRAY VIEW OF THE CHEST     6/26/2022 12:58 pm     COMPARISON:   October 14, 2021     HISTORY:   ORDERING SYSTEM PROVIDED HISTORY: chest pain   TECHNOLOGIST PROVIDED HISTORY:   Reason for exam:->chest pain   Reason for Exam: pt stated he got dizzy,     Initial evaluation for acute chest pain     FINDINGS:   The cardiomediastinal silhouette is normal in size. The lungs are clear. No pleural effusion or pneumothorax is present.  Stable granulomatous   calcification is present on the right.                 Preliminary result by Ethel Fernández MD (06/26/22 13:44:48)                Impression:    No acute cardiopulmonary process                 EKG Interpretation  Please see ED physician's note - Dr. Ronan Cortez - for EKG interpretation      Chart review shows recent radiographs:  No results found. ED COURSE & MEDICAL DECISION MAKING       Vital signs and nursing notes reviewed during ED course. I have independently evaluated this patient . Supervising physician - Dr. Juan Ramon Fish - was present in ED and available for consultation throughout entirety of patient's care. All pertinent Lab data and radiographic results reviewed with patient at bedside. The patient and/or the family were informed of the results of any tests/labs/imaging, the treatment plan, and time was allotted to answer questions. Clinical Impression:  1. Near syncope    2. Lightheaded    3.  KASSY (acute kidney injury) [facility])        Patient presents with family via EMS for lightheadedness and near syncope. On exam, well-appearing nontoxic 45-year-old male, afebrile and in no acute respiratory distress. Vitals are stable, not tachycardic hypoxic tachypneic. He is awake and alert x3, following all commands and answer questions appropriately. Equal strength DTRs and sensation in the bilateral upper and lower extremities. Unremarkable cardiopulmonary exam.  Abdomen soft nontender. Symmetrical pulses in the upper or lower extremities. Patient placed on telemetry and continuous pulse ox monitoring. CT shows normal white count. Hemoglobin is 10.7. CMP mildly elevated creatinine 1.4, BUN is 21. Normal troponin. BNP is elevated at 1317 the patient does not appear clinically fluid overloaded. Normal magnesium and CK. CT head shows no evidence of acute intracranial process. Chest x-ray also negative for evidence of acute cardiopulmonary process or vascular congestion. Did order orthostatic vitals which are pending at time of this dictation. EKG shows rate controlled A. fib at this time, no other acute ischemic features. At this time, unclear exact etiology for patient's frequent episodes of near syncope and lightheadedness however given his age, cardiovascular risk factors, plan to consult with cardiology for further recommendations. I did consult with Dr. Dalton Small - cardiology - and discussed patient's history, ED presentation/course including any pertinent laboratory findings and imaging study findings. He/she did evaluate patient in the ED, does agree with plan for admission for near syncope work-up and possible tilt table test tomorrow. Plan for admission was discussed with patient who verbalized understanding agreement. We will start IV fluids given the noted KASSY. I then spoke with hospitalist, Triston ARRIETA, who agrees to hospital admission. Patient is admitted to the hospital in stable condition.     In consideration of current COVID19 pandemic, with effort to minimize unnecessary provider exposure, this patient was seen at bedside by me independently. However, in compliance with current hospital FIORELLA/ED protocol, prior to admission I did discuss this patient case with emergency department physician, Dr. Enmanuel Chin, who did agree with ED workup/evaluation and plan for admission however but ED attending physician did not independently evaluate the patient. Of note, this Pt was NOT admitted to the ICU. Diagnosis and plan discussed in detail with patient who understands and agrees. Disposition referral (if applicable):  No follow-up provider specified.     Disposition medications (if applicable):  New Prescriptions    No medications on file         (Please note that portions of this note may have been completed with a voice recognition program. Efforts were made to edit the dictations but occasionally words are mis-transcribed.)          Saqib Richardson PA-C  06/26/22 [id number]

## 2022-06-27 LAB
ANION GAP SERPL CALCULATED.3IONS-SCNC: 9 MMOL/L (ref 4–16)
BASOPHILS ABSOLUTE: 0 K/CU MM
BASOPHILS RELATIVE PERCENT: 0.6 % (ref 0–1)
BUN BLDV-MCNC: 16 MG/DL (ref 6–23)
CALCIUM SERPL-MCNC: 8.4 MG/DL (ref 8.3–10.6)
CHLORIDE BLD-SCNC: 107 MMOL/L (ref 99–110)
CO2: 26 MMOL/L (ref 21–32)
CREAT SERPL-MCNC: 1.1 MG/DL (ref 0.9–1.3)
DIFFERENTIAL TYPE: ABNORMAL
EOSINOPHILS ABSOLUTE: 0.1 K/CU MM
EOSINOPHILS RELATIVE PERCENT: 1.9 % (ref 0–3)
GFR AFRICAN AMERICAN: >60 ML/MIN/1.73M2
GFR NON-AFRICAN AMERICAN: >60 ML/MIN/1.73M2
GLUCOSE BLD-MCNC: 89 MG/DL (ref 70–99)
GLUCOSE BLD-MCNC: 97 MG/DL (ref 70–99)
HCT VFR BLD CALC: 32.1 % (ref 42–52)
HEMOGLOBIN: 10.3 GM/DL (ref 13.5–18)
IMMATURE NEUTROPHIL %: 0.2 % (ref 0–0.43)
LV EF: 58 %
LVEF MODALITY: NORMAL
LYMPHOCYTES ABSOLUTE: 1.4 K/CU MM
LYMPHOCYTES RELATIVE PERCENT: 28.2 % (ref 24–44)
MCH RBC QN AUTO: 30.5 PG (ref 27–31)
MCHC RBC AUTO-ENTMCNC: 32.1 % (ref 32–36)
MCV RBC AUTO: 95 FL (ref 78–100)
MONOCYTES ABSOLUTE: 0.5 K/CU MM
MONOCYTES RELATIVE PERCENT: 10.5 % (ref 0–4)
NUCLEATED RBC %: 0 %
PDW BLD-RTO: 13.6 % (ref 11.7–14.9)
PLATELET # BLD: 186 K/CU MM (ref 140–440)
PMV BLD AUTO: 10.3 FL (ref 7.5–11.1)
POTASSIUM SERPL-SCNC: 4.1 MMOL/L (ref 3.5–5.1)
RBC # BLD: 3.38 M/CU MM (ref 4.6–6.2)
SEGMENTED NEUTROPHILS ABSOLUTE COUNT: 2.8 K/CU MM
SEGMENTED NEUTROPHILS RELATIVE PERCENT: 58.6 % (ref 36–66)
SODIUM BLD-SCNC: 142 MMOL/L (ref 135–145)
TOTAL IMMATURE NEUTOROPHIL: 0.01 K/CU MM
TOTAL NUCLEATED RBC: 0 K/CU MM
TROPONIN T: <0.01 NG/ML
TSH HIGH SENSITIVITY: 1 UIU/ML (ref 0.27–4.2)
WBC # BLD: 4.8 K/CU MM (ref 4–10.5)

## 2022-06-27 PROCEDURE — 7100000000 HC PACU RECOVERY - FIRST 15 MIN

## 2022-06-27 PROCEDURE — 99233 SBSQ HOSP IP/OBS HIGH 50: CPT | Performed by: INTERNAL MEDICINE

## 2022-06-27 PROCEDURE — 82962 GLUCOSE BLOOD TEST: CPT

## 2022-06-27 PROCEDURE — 99222 1ST HOSP IP/OBS MODERATE 55: CPT | Performed by: INTERNAL MEDICINE

## 2022-06-27 PROCEDURE — 2140000000 HC CCU INTERMEDIATE R&B

## 2022-06-27 PROCEDURE — 84484 ASSAY OF TROPONIN QUANT: CPT

## 2022-06-27 PROCEDURE — 85025 COMPLETE CBC W/AUTO DIFF WBC: CPT

## 2022-06-27 PROCEDURE — 80048 BASIC METABOLIC PNL TOTAL CA: CPT

## 2022-06-27 PROCEDURE — 94761 N-INVAS EAR/PLS OXIMETRY MLT: CPT

## 2022-06-27 PROCEDURE — APPNB60 APP NON BILLABLE TIME 46-60 MINS: Performed by: NURSE PRACTITIONER

## 2022-06-27 PROCEDURE — 93660 TILT TABLE EVALUATION: CPT | Performed by: INTERNAL MEDICINE

## 2022-06-27 PROCEDURE — 84443 ASSAY THYROID STIM HORMONE: CPT

## 2022-06-27 PROCEDURE — 6370000000 HC RX 637 (ALT 250 FOR IP): Performed by: NURSE PRACTITIONER

## 2022-06-27 PROCEDURE — 2580000003 HC RX 258: Performed by: NURSE PRACTITIONER

## 2022-06-27 PROCEDURE — 93306 TTE W/DOPPLER COMPLETE: CPT

## 2022-06-27 PROCEDURE — 36415 COLL VENOUS BLD VENIPUNCTURE: CPT

## 2022-06-27 PROCEDURE — 6370000000 HC RX 637 (ALT 250 FOR IP): Performed by: INTERNAL MEDICINE

## 2022-06-27 PROCEDURE — 93660 TILT TABLE EVALUATION: CPT

## 2022-06-27 PROCEDURE — 7100000001 HC PACU RECOVERY - ADDTL 15 MIN

## 2022-06-27 RX ORDER — INSULIN LISPRO 100 [IU]/ML
0-3 INJECTION, SOLUTION INTRAVENOUS; SUBCUTANEOUS NIGHTLY
Status: DISCONTINUED | OUTPATIENT
Start: 2022-06-27 | End: 2022-06-28 | Stop reason: HOSPADM

## 2022-06-27 RX ORDER — DOCUSATE SODIUM 100 MG/1
100 CAPSULE, LIQUID FILLED ORAL DAILY
Status: DISCONTINUED | OUTPATIENT
Start: 2022-06-27 | End: 2022-06-28 | Stop reason: HOSPADM

## 2022-06-27 RX ORDER — INSULIN LISPRO 100 [IU]/ML
0-6 INJECTION, SOLUTION INTRAVENOUS; SUBCUTANEOUS
Status: DISCONTINUED | OUTPATIENT
Start: 2022-06-27 | End: 2022-06-28 | Stop reason: HOSPADM

## 2022-06-27 RX ORDER — AMIODARONE HYDROCHLORIDE 200 MG/1
200 TABLET ORAL 2 TIMES DAILY
Status: DISCONTINUED | OUTPATIENT
Start: 2022-06-27 | End: 2022-06-28 | Stop reason: HOSPADM

## 2022-06-27 RX ORDER — DEXTROSE MONOHYDRATE 50 MG/ML
100 INJECTION, SOLUTION INTRAVENOUS PRN
Status: DISCONTINUED | OUTPATIENT
Start: 2022-06-27 | End: 2022-06-28 | Stop reason: HOSPADM

## 2022-06-27 RX ADMIN — AMIODARONE HYDROCHLORIDE 200 MG: 200 TABLET ORAL at 20:52

## 2022-06-27 RX ADMIN — METOPROLOL TARTRATE 25 MG: 25 TABLET, FILM COATED ORAL at 20:52

## 2022-06-27 RX ADMIN — SODIUM CHLORIDE, PRESERVATIVE FREE 10 ML: 5 INJECTION INTRAVENOUS at 20:52

## 2022-06-27 ASSESSMENT — PAIN SCALES - WONG BAKER
WONGBAKER_NUMERICALRESPONSE: 0

## 2022-06-27 ASSESSMENT — PAIN SCALES - GENERAL
PAINLEVEL_OUTOF10: 0

## 2022-06-27 NOTE — PROGRESS NOTES
1. PAF /PAT  2. Near syncope    Patient has atrial fibrillation and there is also concern of atrial tachycardia/flutter. Patient is having intermittent tachycardia episodes and multiple PACs are noted. Patient had previous bradycardia too. Etiology of near syncope is unclear. Given his PAT and PAF episodes I am concerned about sick sinus syndrome so I will recommend putting event monitor at the time of discharge. Please check TSH if the TSH is normal I will start him on amiodarone. Will do like to avoid increased doses of AV sydnie blocking agents as I am concerned about bradycardia in this patient given his previous EKGs.     Will start on amiodarone and assess on the event monitor if TSH is normal.    Full note to follow

## 2022-06-27 NOTE — PROGRESS NOTES
V2.0  [facility] Hospitalist Progress Note      Name:  Kalia Keen /Age/Sex: 1945  (78 y.o. male)   MRN & CSN:  [id number] & [id number] Encounter Date/Time: 2022 6:57 PM EDT    Location:  [facility]/1753-A PCP: Brandi Brennan, [address] Day: 2    Assessment and Plan:   Kalia Taylorr is a 78 y.o. male who presents with Near syncope    Near Syncope likely cardiac related  Recently dx with A-fib. EKG consistent with stable A-fib. AAMIR score: 3.   -Continuous telemetry monitoring  -Fall precautions  - continue to hold Lyrica  -Serial troponin's negative  -Echo shows normal EF 55%, mild LVH, Sclerotic aortic valve, & grade 2 diastolic dysfunction. -EKG consistent with A fib. -Tilt table test normal.  - continue Lovenox 1mg/kg BID and BB  - Orthostatic positive but pt asymptomatic. EP consult recommended per cardiology. - EP concerned for sick sinus syndrome. Amiodarone per EP. Mild KASSY, resolving  Peak Creat 1.4 with eGFR 50  Now near baseline.  -continue to encourage PO Hydration  -continue to hold Lisinopril and Metformin  -Avoid nephrotoxins agents  -Monitor BMP  - monitor I/Os     CAD s/p PTCA ()  -Continue ASA and Lipitor  -Was on Coreg in the past   -Continue Metoprolol as tolerated  - Amiodarone started per EP     DMII without chronic complications   Last W7A 7.4 ( 3/2022)  -Hold Metformin  -SSI and hypoglycemia protocol     Essential HTN  -Hold BP medications per cardiology recommendation     Iron deficiency anemia  Stable at 10.7. PT sees Dr. Santa Richardson  -Continue iron tablet     HLD  -Continue Fenofibrate and Lipitor      Diet ADULT DIET; Regular;  Low Sodium (2 gm)   DVT Prophylaxis [x] Lovenox, []  Heparin, [] SCDs, [] Ambulation,  [] Eliquis, [] Xarelto  [] Coumadin   GI Prophylaxis [] PPI,  [] H2 Blocker,  [] Carafate,  [] Diet,  [x] No GI prophylaxis, N/A: patient is not under significant medical stress, non-ICU or is receiving a diet/tube feeds   Code Status Full Code   Disposition Mild MR & LVH. PSHX:  has a past surgical history that includes Toe Surgery; Cataract removal; Balloon angioplasty, artery (02/1994); and Cardiac catheterization. Allergies: No Known Allergies    FAM HX: family history includes Heart Attack in his father; Heart Disease in his father. Soc HX:   Social History     Socioeconomic History    Marital status:      Spouse name: None    Number of children: None    Years of education: None    Highest education level: None   Occupational History    None   Tobacco Use    Smoking status: Former Smoker    Smokeless tobacco: Never Used   Substance and Sexual Activity    Alcohol use: No     Comment: Caffeine: Drinks 10 cups caffeinated coffee daily.  Drug use: No    Sexual activity: None   Other Topics Concern    None   Social History Narrative    None     Social Determinants of Health     Financial Resource Strain:     Difficulty of Paying Living Expenses: Not on file   Food Insecurity:     Worried About Running Out of Food in the Last Year: Not on file    Radha of Food in the Last Year: Not on file   Transportation Needs:     Lack of Transportation (Medical): Not on file    Lack of Transportation (Non-Medical):  Not on file   Physical Activity:     Days of Exercise per Week: Not on file    Minutes of Exercise per Session: Not on file   Stress:     Feeling of Stress : Not on file   Social Connections:     Frequency of Communication with Friends and Family: Not on file    Frequency of Social Gatherings with Friends and Family: Not on file    Attends Latter-day Services: Not on file    Active Member of Clubs or Organizations: Not on file    Attends Club or Organization Meetings: Not on file    Marital Status: Not on file   Intimate Partner Violence:     Fear of Current or Ex-Partner: Not on file    Emotionally Abused: Not on file    Physically Abused: Not on file    Sexually Abused: Not on file   Housing Stability:     Unable to Pay for Housing in the Last Year: Not on file    Number of Places Lived in the Last Year: Not on file    Unstable Housing in the Last Year: Not on file       Medications:   Medications:    docusate sodium  100 mg Oral Daily    [Held by provider] rivaroxaban  20 mg Oral Daily    insulin lispro  0-6 Units SubCUTAneous TID WC    insulin lispro  0-3 Units SubCUTAneous Nightly    aspirin  81 mg Oral Daily    atorvastatin  10 mg Oral Daily    fenofibrate  135 mg Oral Daily    ferrous sulfate  325 mg Oral Daily with breakfast    sodium chloride flush  5-40 mL IntraVENous 2 times per day    metoprolol tartrate  25 mg Oral BID      Infusions:    dextrose      sodium chloride       PRN Meds: glucose, 4 tablet, PRN  dextrose bolus, 125 mL, PRN   Or  dextrose bolus, 250 mL, PRN  glucagon (rDNA), 1 mg, PRN  dextrose, 100 mL/hr, PRN  sodium chloride flush, 5-40 mL, PRN  sodium chloride, , PRN  ondansetron, 4 mg, Q8H PRN   Or  ondansetron, 4 mg, Q6H PRN  polyethylene glycol, 17 g, Daily PRN  acetaminophen, 650 mg, Q6H PRN   Or  acetaminophen, 650 mg, Q6H PRN          Labs      Recent Results (from the past 24 hour(s))   Troponin    Collection Time: 06/26/22  8:27 PM   Result Value Ref Range    Troponin T <0.010 <0.01 NG/ML   Basic Metabolic Panel w/ Reflex to MG    Collection Time: 06/27/22  6:17 AM   Result Value Ref Range    Sodium 142 135 - 145 MMOL/L    Potassium 4.1 3.5 - 5.1 MMOL/L    Chloride 107 99 - 110 mMol/L    CO2 26 21 - 32 MMOL/L    Anion Gap 9 4 - 16    BUN 16 6 - 23 MG/DL    CREATININE 1.1 0.9 - 1.3 MG/DL    Glucose 97 70 - 99 MG/DL    Calcium 8.4 8.3 - 10.6 MG/DL    GFR Non-African American >60 >60 mL/min/1.73m2    GFR African American >60 >60 mL/min/1.73m2   CBC with Auto Differential    Collection Time: 06/27/22  6:17 AM   Result Value Ref Range    WBC 4.8 4.0 - 10.5 K/CU MM    RBC 3.38 (L) 4.6 - 6.2 M/CU MM    Hemoglobin 10.3 (L) 13.5 - 18.0 GM/DL    Hematocrit 32.1 (L) 42 - 52 %    MCV 95.0 78 - 100 FL    MCH 30.5 27 - 31 PG    MCHC 32.1 32.0 - 36.0 %    RDW 13.6 11.7 - 14.9 %    Platelets 781 828 - 490 K/CU MM    MPV 10.3 7.5 - 11.1 FL    Differential Type AUTOMATED DIFFERENTIAL     Segs Relative 58.6 36 - 66 %    Lymphocytes % 28.2 24 - 44 %    Monocytes % 10.5 (H) 0 - 4 %    Eosinophils % 1.9 0 - 3 %    Basophils % 0.6 0 - 1 %    Segs Absolute 2.8 K/CU MM    Lymphocytes Absolute 1.4 K/CU MM    Monocytes Absolute 0.5 K/CU MM    Eosinophils Absolute 0.1 K/CU MM    Basophils Absolute 0.0 K/CU MM    Nucleated RBC % 0.0 %    Total Nucleated RBC 0.0 K/CU MM    Total Immature Neutrophil 0.01 K/CU MM    Immature Neutrophil % 0.2 0 - 0.43 %   Troponin    Collection Time: 06/27/22  6:17 AM   Result Value Ref Range    Troponin T <0.010 <0.01 NG/ML   TSH    Collection Time: 06/27/22  6:17 AM   Result Value Ref Range    TSH, High Sensitivity 0.998 0.270 - 4.20 uIu/ml        Imaging/Diagnostics Last 24 Hours   Echocardiogram complete 2D with doppler with color    Result Date: 6/27/2022  Transthoracic Echocardiography Report (TTE)  Demographics   Patient Name       Hailey Kang      Date of Study       06/27/2022   Date of Birth      1945         Gender              Male   Age                78 year(s)         Race                   Patient Number     [id number]         Room Number         7579   Visit Number       [id number]   Corporate ID       [id number]   Accession Number   [id number]         Arcelia Gustafson,                                                            JUAN   Ordering Physician Sweetie Tierney                     CNP                Physician           Judy Martin MD  Procedure Type of Study   TTE procedure:ECHOCARDIOGRAM COMPLETE 2D W DOPPLER W COLOR. Procedure Date Date: 06/27/2022 Start: 08:04 AM Study Location: Portable Technical Quality: Fair visualization Indications:Syncope.  Patient Status: Routine Height: 68 inches Weight: 156 pounds BSA: 1.84 m2 BMI: 23.72 kg/m2 HR: 64 bpm BP: 125/77 mmHg  Conclusions   Summary  Left ventricular systolic function is normal.  Ejection fraction is visually estimated at 55-60%. Mild left ventricular hypertrophy. Grade II diastolic dysfunction. Sclerotic, but non-stenotic aortic valve. No evidence of any pericardial effusion. Signature   ------------------------------------------------------------------  Electronically signed by Juan Manuel Rodríguez MD  (Interpreting physician) on 06/27/2022 at 01:59 PM  ------------------------------------------------------------------   Findings   Left Ventricle  Left ventricular systolic function is normal.  Ejection fraction is visually estimated at 55-60%. Mild left ventricular hypertrophy. Grade II diastolic dysfunction. Left ventricle size is normal.  No regional wall motion abnormalites. Left Atrium  Mildly dilated left atrium. Right Atrium  Essentially normal right atrium. Right Ventricle  Essentially normal right ventricle. Aortic Valve  Sclerotic, but non-stenotic aortic valve. Mitral Valve  Trace mitral regurgitation. Tricuspid Valve  Trace tricuspid regurgitation; normal RVSP. Pulmonic Valve  The pulmonic valve was not well visualized. Pericardial Effusion  No evidence of any pericardial effusion. Pleural Effusion  No evidence of pleural effusion. Miscellaneous  IVC and abdominal aorta are within normal limits.   M-Mode/2D Measurements & Calculations   LV Diastolic Dimension:  LV Systolic Dimension:  LA Dimension: 3.4 cmAO Root  2.96 cm                  2.21 cm                 Dimension: 3.1 cmLA Area:  LV FS:25.3 %             LV Volume Diastolic:    77.9 cm2  LV PW Diastolic: 1.99 cm 500 ml  Septum Diastolic: 7.42   LV Volume Systolic: 53  cm                       ml  CO: 7.41 l/min           LV EDV/LV EDV Index:    RV Diastolic Dimension:  CI: 9.38 l/m*m2          121 ml/66 m2LV ESV/LV   2.82 cm                           ESV Index: 53 ml/29 m2  LV Area Diastolic: 84.6  EF Calculated (A4C):    LA/Aorta: 1.1  cm2                      56.2 %  LV Area Systolic: 64.7   EF Calculated (2D):     LA volume/Index: 82 ml  cm2                      51.6 %                  /45m2                            LV Length: 8.85 cm                            LVOT: 2.3 cm  Doppler Measurements & Calculations   MV Peak E-Wave: 99.7  AV Peak Velocity: 152 cm/s   LVOT Peak Velocity: 122  cm/s                  AV Peak Gradient: 9.24 mmHg  cm/s  MV Peak A-Wave: 51.3  AV Mean Velocity: 101 cm/s   LVOT Mean Velocity: 73.5  cm/s                  AV Mean Gradient: 5 mmHg     cm/s  MV E/A Ratio: 1.94    AV VTI: 32.3 cm              LVOT Peak Gradient: 6  MV Peak Gradient:     AV Area (Continuity):3.59    mmHgLVOT Mean Gradient: 3  3.98 mmHg             cm2                          mmHg   MV P1/2t: 68 msec     LVOT VTI: 27.9 cm  MVA by PHT:3.24 cm2                                                     TR Velocity:143 cm/s  MV E' Septal                                       TR Gradient:8.18 mmHg  Velocity: 8.44 cm/s                                PV Peak Velocity: 107  MV E' Lateral                                      cm/s  Velocity: 3.62 cm/s                                PV Peak Gradient: 4.58  MV E/E' septal: 11.81                              mmHg  MV E/E' lateral:  27.54                                                     MA ED Velocity: 59.7 cm/s      CT HEAD WO CONTRAST    Result Date: 6/26/2022  EXAMINATION: CT OF THE HEAD WITHOUT CONTRAST  6/26/2022 1:32 pm TECHNIQUE: CT of the head was performed without the administration of intravenous contrast. Automated exposure control, iterative reconstruction, and/or weight based adjustment of the mA/kV was utilized to reduce the radiation dose to as low as reasonably achievable. COMPARISON: None.  HISTORY: ORDERING SYSTEM PROVIDED HISTORY: intermittent dizziness, syncope TECHNOLOGIST PROVIDED HISTORY: Has a \"code stroke\" or \"stroke alert\" been called? ->No Reason for exam:->intermittent dizziness, syncope Decision Support Exception - unselect if not a suspected or confirmed emergency medical condition->Emergency Medical Condition (MA) Reason for Exam: DIZZINESS FINDINGS: BRAIN/VENTRICLES: There is no acute intracranial hemorrhage, mass effect or midline shift. No abnormal extra-axial fluid collection. The gray-white differentiation is maintained without evidence of an acute infarct. There is no evidence of hydrocephalus. ORBITS: The visualized portion of the orbits demonstrate no acute abnormality. SINUSES: The visualized paranasal sinuses and mastoid air cells demonstrate no acute abnormality. SOFT TISSUES/SKULL:  No acute abnormality of the visualized skull or soft tissues. No acute intracranial abnormality. XR CHEST PORTABLE    Result Date: 6/26/2022  EXAMINATION: ONE XRAY VIEW OF THE CHEST 6/26/2022 12:58 pm COMPARISON: October 14, 2021 HISTORY: ORDERING SYSTEM PROVIDED HISTORY: chest pain TECHNOLOGIST PROVIDED HISTORY: Reason for exam:->chest pain Reason for Exam: pt stated he got dizzy, Initial evaluation for acute chest pain FINDINGS: The cardiomediastinal silhouette is normal in size. The lungs are clear. No pleural effusion or pneumothorax is present. Stable granulomatous calcification is present on the right. No acute cardiopulmonary process.          Electronically signed by John Richardson DO on 6/27/2022 at 6:57 PM

## 2022-06-27 NOTE — PROCEDURES
Tilt table test       INDICATION:   SYNCOPE        After obtaining the informed consent pt brought to lab.     (please see the hemodynamic sheet for BP, HR and timings)  STAGE 1; (70 degree tilt)   Normal hemodynamic response. STAGE 2:   CSM bilaterally done, no change in hemodynamics. STAGE 3:   NTG given S/L. Normal hemodynamic response drop in blood pressure noted    TIME BP HR RR SPO2 COMMENTS   1400 160/73 94 22 97 SUPINE DENIES DIZZINESS   1404 145/89 99 20 98 SUPINE   1405   90 26 97 TABLE UP DENIES DIZZINESS   1407 139/79 79 24 97 NO CHANGES   1409 145/76 84 28 96 NO CHANGES   1411 142/76 90 29 96 DENIES DIZZINESS   1413 148/76 76 22 96 NO CHANGES   1415 174/79 101 18 97 DENIES DIZZINESS   1417 163/77 80 19 97 NO CHANGES   1419 145/64 82 17 96 DENIES DIZZINESS   1421 144/78 83 18 97 NO CHANGES   1422 144/78 82 19 97 LEFT AND RIGHT CAROTID MASSAGE   1423 153/71 76 17 97 NITRO SPRAY SUBLINGUIL   1424 135/76 78 22 96 DENIES DIZZINESS   1425 129/76 76 28 97 NO CHANGES   1426 117/65 82 20 96 DENIES DIZZINESS   1427 111/60 84 26 95 NO CHANGES   1428 117/79 82 17 95 DENIES DIZZINESS   1429 108/78 85 19 98 NO CHANGES   1430 100/66 85 22 96 DENIES DIZZINESS   1431 112/71 81 18 95 NO CHANGES   1432 103/67 84 22 93 DENIES DIZZINESS   1433 99/64 80 25 94 NO CHANGES   1435 105/70 81 19 96 DENIES DIZZINESS TABLE DOWN       Impression:   Normal response. Plan:   Nonvascular workup.

## 2022-06-27 NOTE — PROGRESS NOTES
Today's plan: Tilt table test performed patient did not have any vagal syncope, he did have orthostatic drop in blood pressure however without any symptoms, will discuss with EP consult for cardioversion in the meanwhile continue full dose anticoagulation and rate control today      Admit Date:  6/26/2022    Subjective: Better      Chief complaints on admission  Chief Complaint   Patient presents with    Atrial Fibrillation     near syncope at work          History of present illness:Cricket is a 78 y. o.year old who  presents with had concerns including Atrial Fibrillation (near syncope at work ). Past medical history:    has a past medical history of Abdominal Doppler, Acute MI ([facility] [address] Arthritis, CAD (coronary artery disease), Diabetes mellitus ([facility] [address] Essential hypertension, H/O cardiovascular stress test, H/O echocardiogram, and H/O echocardiogram.  Past surgical history:   has a past surgical history that includes Toe Surgery; Cataract removal; Balloon angioplasty, artery (02/1994); and Cardiac catheterization. Social History:   reports that he has quit smoking. He has never used smokeless tobacco. He reports that he does not drink alcohol and does not use drugs. Family history:  family history includes Heart Attack in his father; Heart Disease in his father. No Known Allergies      Objective:   /77   Pulse 76   Temp 98.1 °F (36.7 °C) (Oral)   Resp 14   Ht 5' 8\" (1.727 m)   Wt 156 lb (70.8 kg)   SpO2 95%   BMI 23.72 kg/m²   No intake or output data in the 24 hours ending 06/27/22 1439    TELEMETRY: AFIB    Physical Exam:  Constitutional:  Well developed, Well nourished, No acute distress, Non-toxic appearance. HENT:  Normocephalic, Atraumatic, Bilateral external ears normal, Oropharynx moist, No oral exudates, Nose normal. Neck- Normal range of motion, No tenderness, Supple, No stridor. Eyes:  PERRL, EOMI, Conjunctiva normal, No discharge.    Respiratory:  Normal breath sounds, No respiratory distress, No wheezing, No chest tenderness. ,no use of accessory muscles, diaphragm movement is normal  Cardiovascular: (PMI) apex non displaced,no lifts no thrills, no s3,no s4, Normal heart rate, Normal rhythm, No murmurs, No rubs, No gallops. Carotid arteries pulse and amplitude are normal no bruit, no abdominal bruit noted ( normal abdominal aorta ausculation), femoral arteries pulse and amplitude are normal no bruit, pedal pulses are normal  GI:  Bowel sounds normal, Soft, No tenderness, No masses, No pulsatile masses. : External genitalia appear normal, No masses or lesions. No discharge. No CVA tenderness. Musculoskeletal:  Intact distal pulses, No edema, No tenderness, No cyanosis, No clubbing. Good range of motion in all major joints. No tenderness to palpation or major deformities noted. Back- No tenderness. Integument:  Warm, Dry, No erythema, No rash. Lymphatic:  No lymphadenopathy noted. Neurologic:  Alert & oriented x 3, Normal motor function, Normal sensory function, No focal deficits noted.    Psychiatric:  Affect normal, Judgment normal, Mood normal.     Medications:    docusate sodium  100 mg Oral Daily    aspirin  81 mg Oral Daily    atorvastatin  10 mg Oral Daily    fenofibrate  135 mg Oral Daily    ferrous sulfate  325 mg Oral Daily with breakfast    sodium chloride flush  5-40 mL IntraVENous 2 times per day    enoxaparin  1 mg/kg SubCUTAneous BID    metoprolol tartrate  25 mg Oral BID      sodium chloride 125 mL/hr at 06/26/22 1742    sodium chloride       sodium chloride flush, sodium chloride, ondansetron **OR** ondansetron, polyethylene glycol, acetaminophen **OR** acetaminophen    Lab Data:  CBC:   Recent Labs     06/26/22  1225 06/27/22  0617   WBC 5.7 4.8   HGB 10.7* 10.3*   HCT 33.4* 32.1*   MCV 95.7 95.0    186     BMP:   Recent Labs     06/26/22  1225 06/27/22  0617    142   K 4.1 4.1    107   CO2 26 26   BUN 21 16   CREATININE 1.4* 1. 1     LIVER PROFILE:   Recent Labs     06/26/22  1225   AST 18   ALT 11   BILITOT 0.4   ALKPHOS 52     PT/INR: No results for input(s): PROTIME, INR in the last 72 hours. APTT: No results for input(s): APTT in the last 72 hours. BNP:  No results for input(s): BNP in the last 72 hours. TROPONIN: @TROPONINI:3@      Assessment:  78 y. o.year old who is admitted for          Plan:  1. Presyncope could be combination of A. fib and orthostatic hypotension, will admit the patient to the ED to the hospital tilt able test is negative for vasovagal syncope however he did have orthostatic drop in blood pressure without any symptoms, will get EP consult for A. fib management   2. CAD he has history of PTCA in 1994 and an L3 recommend he continue aspirin and statins and metoprolol  3. Diabetes controlled continue metformin lipidemia continue statins  4. Hypertension, he used to be on Coreg also which is listed in the medication list at this time will hold off his lisinopril and will discuss about his medication adjustment based on the table test results. All labs, medications and tests reviewed, continue all other medications of all above medical condition listed as is.       Chente Reyes MD, MD 6/27/2022 2:39 PM

## 2022-06-27 NOTE — CONSULTS
Electrophysiology Consult Note      Reason for consultation: Atrial fibrillation    Chief complaint : Dizziness, near syncope    Referring physician: Dr. Constantine Hyman      Primary care physician: Eliana Ramírez MD      History of Present Illness:     Kamron Casas is a 78year old male with a history of CAD, HTN, Diabetes type 2, dyslipidemia, and iron deficiency anemia presents with complaints of dizziness and near syncope. Patient reports that yesterday he was at [employer] when while standing he began to feel dizzy. He states that he made his way to a seat and still felt dizzy. He listening to the [employer] service but then asked for prayer because he wasn't feeling well. The patient ambulated to a pew where he laid down until the EMS arrived. Patient states that he doesn't think he passed out completely. He states that he was dizzy, his vision became dark but here could here everything around him. He states that after he laid down he felt better. Patient states he was told by EMS that he had atrial fibrillation. Patient states he has never been told he has afib. He denies chest pain, palpitations, shortness of breath, or edema. He states that in May he had a similar episode however he did lose consciousness for a brief period of time. He did not go to the hospital for this episode. 6/27/2022 ECHO EF 55-60%  4/9/2019 stress test reveals normal myocardial perfusion. On admission potassium is 4.1 and creatinine 1.4. Magnesium 1.8. Troponin <0.010 x3. Pastmedical history:   Past Medical History:   Diagnosis Date    Abdominal Doppler 07/06/2021    Normal study    Acute MI ([facility] [address]     1993 - angioplasty, no stent, Dr. Nik Hernandez CAD (coronary artery disease)     Diabetes mellitus ([facility] [address]     Essential hypertension 08/18/2020    H/O cardiovascular stress test 04/09/2019    Normal study.  H/O echocardiogram 04/18/2019    EF 55-60%, Normal study.     H/O echocardiogram 12/21/2020    EF 55-60%, Mild MR & LVH. Surgical history :   Past Surgical History:   Procedure Laterality Date    BALLOON ANGIOPLASTY, ARTERY  02/1994    CARDIAC CATHETERIZATION      CATARACT REMOVAL      TOE SURGERY         Family history:   Family History   Problem Relation Age of Onset    Heart Attack Father     Heart Disease Father        Social history :  reports that he has quit smoking. He has never used smokeless tobacco. He reports that he does not drink alcohol and does not use drugs. No Known Allergies    No current facility-administered medications on file prior to encounter. Current Outpatient Medications on File Prior to Encounter   Medication Sig Dispense Refill    loperamide (IMODIUM A-D) 2 MG tablet Take 1 tablet by mouth 2 times daily      bismuth subsalicylate (PEPTO BISMOL) 262 MG chewable tablet Take 262 mg by mouth 2 times daily 2 tablets twice daily      Lancets (ONETOUCH DELICA PLUS [id number]) MISC       ONETOUCH VERIO strip       lisinopril (PRINIVIL;ZESTRIL) 5 MG tablet Take 1 tablet by mouth daily 30 tablet 2    Coenzyme Q10 (COQ-10) 30 MG CAPS Take 30 mg by mouth daily      fenofibrate (TRICOR) 145 MG tablet Take 145 mg by mouth daily      budesonide (ENTOCORT EC) 3 MG extended release capsule Take 3 mg by mouth daily      metFORMIN (GLUCOPHAGE-XR) 500 MG extended release tablet Take 500 mg by mouth 2 times daily (with meals)       b complex vitamins capsule Take 1 capsule by mouth daily      ferrous sulfate 325 (65 Fe) MG tablet Take 325 mg by mouth daily (with breakfast)      Cyanocobalamin (VITAMIN B 12 PO) Take by mouth every other day       pregabalin (LYRICA) 75 MG capsule Take 75 mg by mouth 3 times daily.  aspirin 81 MG tablet Take 81 mg by mouth daily       atorvastatin (LIPITOR) 10 MG tablet Take 10 mg by mouth daily.          Review of Systems:   Review of Systems   Constitutional: Negative for activity change, chills, fatigue and fever. HENT: Negative for congestion, ear pain and tinnitus. Eyes: Negative for photophobia, pain and visual disturbance. Respiratory: Negative for cough, chest tightness, shortness of breath and wheezing. Cardiovascular: Negative for chest pain, palpitations and leg swelling. Gastrointestinal: Negative for abdominal pain, blood in stool, constipation, diarrhea, nausea and vomiting. Endocrine: Negative for cold intolerance and heat intolerance. Genitourinary: Negative for dysuria, flank pain and hematuria. Musculoskeletal: Positive for arthralgias. Negative for back pain, myalgias and neck stiffness. Skin: Negative for color change and rash. Allergic/Immunologic: Negative for food allergies. Neurological: Positive for dizziness. Negative for light-headedness, numbness and headaches. Hematological: Does not bruise/bleed easily. Psychiatric/Behavioral: Negative for agitation, behavioral problems and confusion. Examination:      Vitals:    06/27/22 0600 06/27/22 0700 06/27/22 0900 06/27/22 1142   BP: 125/84 (!) 101/50 125/77    Pulse: 84 62 92 76   Resp: 13 14 16 14   Temp:   98.1 °F (36.7 °C)    TempSrc:   Oral    SpO2:   97% 95%   Weight:       Height:            Body mass index is 23.72 kg/m². Physical Exam  Constitutional:       General: He is not in acute distress. Appearance: He is not ill-appearing or diaphoretic. HENT:      Head: Normocephalic and atraumatic. Right Ear: External ear normal.      Left Ear: External ear normal.      Nose: No congestion. Mouth/Throat:      Mouth: Mucous membranes are moist.   Eyes:      Extraocular Movements: Extraocular movements intact. Conjunctiva/sclera: Conjunctivae normal.      Pupils: Pupils are equal, round, and reactive to light. Cardiovascular:      Rate and Rhythm: Normal rate. Rhythm irregular. Heart sounds: No murmur heard.        Comments: Frequent PAC  Pulmonary:      Effort: Pulmonary intermittent tachycardia episodes and multiple PACs are noted. Patient had previous bradycardia too.     Etiology of near syncope is unclear. Given his PAT and PAF episodes I am concerned about sick sinus syndrome so I will recommend putting event monitor at the time of discharge. Please check TSH if the TSH is normal I will start him on amiodarone.     Will do like to avoid increased doses of AV sydnie blocking agents as I am concerned about bradycardia in this patient given his previous EKGs.     Will start on amiodarone and assess on the event monitor if TSH is normal.     Recommend anticoagulation - can start on xarelto    Thanks again for allowing me to participate in care of this patient. Please call me if you have any questions. With best regards. Sha Amezcua MD, 6/27/2022 4:22 PM     Please note this report has been partially produced using speech recognition software and may contain errors related to that system including errors in grammar, punctuation, and spelling, as well as words and phrases that may be inappropriate. If there are any questions or concerns please feel free to contact the dictating provider for clarification.

## 2022-06-28 VITALS
SYSTOLIC BLOOD PRESSURE: 117 MMHG | DIASTOLIC BLOOD PRESSURE: 67 MMHG | TEMPERATURE: 98 F | OXYGEN SATURATION: 96 % | HEART RATE: 58 BPM | RESPIRATION RATE: 14 BRPM | WEIGHT: 156 LBS | BODY MASS INDEX: 23.64 KG/M2 | HEIGHT: 68 IN

## 2022-06-28 DIAGNOSIS — I48.19 PERSISTENT ATRIAL FIBRILLATION (HCC): Primary | ICD-10-CM

## 2022-06-28 DIAGNOSIS — I48.0 PAROXYSMAL ATRIAL FIBRILLATION (HCC): ICD-10-CM

## 2022-06-28 LAB
ANION GAP SERPL CALCULATED.3IONS-SCNC: 9 MMOL/L (ref 4–16)
BASOPHILS ABSOLUTE: 0 K/CU MM
BASOPHILS RELATIVE PERCENT: 0.7 % (ref 0–1)
BUN BLDV-MCNC: 17 MG/DL (ref 6–23)
CALCIUM SERPL-MCNC: 8.9 MG/DL (ref 8.3–10.6)
CHLORIDE BLD-SCNC: 108 MMOL/L (ref 99–110)
CO2: 27 MMOL/L (ref 21–32)
CREAT SERPL-MCNC: 1.1 MG/DL (ref 0.9–1.3)
DIFFERENTIAL TYPE: ABNORMAL
EKG ATRIAL RATE: 357 BPM
EKG DIAGNOSIS: NORMAL
EKG Q-T INTERVAL: 398 MS
EKG QRS DURATION: 90 MS
EKG QTC CALCULATION (BAZETT): 432 MS
EKG R AXIS: 36 DEGREES
EKG T AXIS: 22 DEGREES
EKG VENTRICULAR RATE: 71 BPM
EOSINOPHILS ABSOLUTE: 0.1 K/CU MM
EOSINOPHILS RELATIVE PERCENT: 2.6 % (ref 0–3)
ESTIMATED AVERAGE GLUCOSE: 151 MG/DL
GFR AFRICAN AMERICAN: >60 ML/MIN/1.73M2
GFR NON-AFRICAN AMERICAN: >60 ML/MIN/1.73M2
GLUCOSE BLD-MCNC: 100 MG/DL (ref 70–99)
GLUCOSE BLD-MCNC: 112 MG/DL (ref 70–99)
HBA1C MFR BLD: 6.9 % (ref 4.2–6.3)
HCT VFR BLD CALC: 31.2 % (ref 42–52)
HEMOGLOBIN: 10.1 GM/DL (ref 13.5–18)
IMMATURE NEUTROPHIL %: 0.2 % (ref 0–0.43)
LYMPHOCYTES ABSOLUTE: 1.3 K/CU MM
LYMPHOCYTES RELATIVE PERCENT: 31.3 % (ref 24–44)
MAGNESIUM: 1.7 MG/DL (ref 1.8–2.4)
MCH RBC QN AUTO: 30.7 PG (ref 27–31)
MCHC RBC AUTO-ENTMCNC: 32.4 % (ref 32–36)
MCV RBC AUTO: 94.8 FL (ref 78–100)
MONOCYTES ABSOLUTE: 0.4 K/CU MM
MONOCYTES RELATIVE PERCENT: 9.8 % (ref 0–4)
NUCLEATED RBC %: 0 %
PDW BLD-RTO: 13.3 % (ref 11.7–14.9)
PLATELET # BLD: 176 K/CU MM (ref 140–440)
PMV BLD AUTO: 10.2 FL (ref 7.5–11.1)
POTASSIUM SERPL-SCNC: 4.1 MMOL/L (ref 3.5–5.1)
RBC # BLD: 3.29 M/CU MM (ref 4.6–6.2)
SEGMENTED NEUTROPHILS ABSOLUTE COUNT: 2.3 K/CU MM
SEGMENTED NEUTROPHILS RELATIVE PERCENT: 55.4 % (ref 36–66)
SODIUM BLD-SCNC: 144 MMOL/L (ref 135–145)
TOTAL IMMATURE NEUTOROPHIL: 0.01 K/CU MM
TOTAL NUCLEATED RBC: 0 K/CU MM
WBC # BLD: 4.2 K/CU MM (ref 4–10.5)

## 2022-06-28 PROCEDURE — 83735 ASSAY OF MAGNESIUM: CPT

## 2022-06-28 PROCEDURE — 6370000000 HC RX 637 (ALT 250 FOR IP): Performed by: FAMILY MEDICINE

## 2022-06-28 PROCEDURE — 2580000003 HC RX 258: Performed by: NURSE PRACTITIONER

## 2022-06-28 PROCEDURE — 6360000002 HC RX W HCPCS: Performed by: INTERNAL MEDICINE

## 2022-06-28 PROCEDURE — 85025 COMPLETE CBC W/AUTO DIFF WBC: CPT

## 2022-06-28 PROCEDURE — 36415 COLL VENOUS BLD VENIPUNCTURE: CPT

## 2022-06-28 PROCEDURE — 83036 HEMOGLOBIN GLYCOSYLATED A1C: CPT

## 2022-06-28 PROCEDURE — 99232 SBSQ HOSP IP/OBS MODERATE 35: CPT | Performed by: NURSE PRACTITIONER

## 2022-06-28 PROCEDURE — 99223 1ST HOSP IP/OBS HIGH 75: CPT | Performed by: INTERNAL MEDICINE

## 2022-06-28 PROCEDURE — 6370000000 HC RX 637 (ALT 250 FOR IP): Performed by: NURSE PRACTITIONER

## 2022-06-28 PROCEDURE — 94761 N-INVAS EAR/PLS OXIMETRY MLT: CPT

## 2022-06-28 PROCEDURE — 6370000000 HC RX 637 (ALT 250 FOR IP): Performed by: INTERNAL MEDICINE

## 2022-06-28 PROCEDURE — 80048 BASIC METABOLIC PNL TOTAL CA: CPT

## 2022-06-28 PROCEDURE — 82962 GLUCOSE BLOOD TEST: CPT

## 2022-06-28 PROCEDURE — 93010 ELECTROCARDIOGRAM REPORT: CPT | Performed by: INTERNAL MEDICINE

## 2022-06-28 RX ORDER — LANOLIN ALCOHOL/MO/W.PET/CERES
400 CREAM (GRAM) TOPICAL DAILY
Qty: 30 TABLET | Refills: 0 | Status: SHIPPED | OUTPATIENT
Start: 2022-06-28 | End: 2022-07-20 | Stop reason: SDUPTHER

## 2022-06-28 RX ORDER — MAGNESIUM SULFATE IN WATER 40 MG/ML
2000 INJECTION, SOLUTION INTRAVENOUS ONCE
Status: COMPLETED | OUTPATIENT
Start: 2022-06-28 | End: 2022-06-28

## 2022-06-28 RX ORDER — LANOLIN ALCOHOL/MO/W.PET/CERES
400 CREAM (GRAM) TOPICAL DAILY
Status: DISCONTINUED | OUTPATIENT
Start: 2022-06-28 | End: 2022-06-28 | Stop reason: HOSPADM

## 2022-06-28 RX ORDER — AMIODARONE HYDROCHLORIDE 200 MG/1
200 TABLET ORAL 2 TIMES DAILY
Qty: 42 TABLET | Refills: 0 | Status: SHIPPED | OUTPATIENT
Start: 2022-06-28 | End: 2022-07-20 | Stop reason: SDUPTHER

## 2022-06-28 RX ORDER — FAMOTIDINE 20 MG/1
20 TABLET, FILM COATED ORAL DAILY
Qty: 30 TABLET | Refills: 0 | Status: SHIPPED | OUTPATIENT
Start: 2022-06-28 | End: 2022-08-05 | Stop reason: ALTCHOICE

## 2022-06-28 RX ADMIN — SODIUM CHLORIDE, PRESERVATIVE FREE 10 ML: 5 INJECTION INTRAVENOUS at 08:54

## 2022-06-28 RX ADMIN — METOPROLOL TARTRATE 25 MG: 25 TABLET, FILM COATED ORAL at 08:56

## 2022-06-28 RX ADMIN — ASPIRIN 81 MG: 81 TABLET, COATED ORAL at 08:54

## 2022-06-28 RX ADMIN — FENOFIBRATE 135 MG: 54 TABLET ORAL at 08:58

## 2022-06-28 RX ADMIN — AMIODARONE HYDROCHLORIDE 200 MG: 200 TABLET ORAL at 08:56

## 2022-06-28 RX ADMIN — RIVAROXABAN 20 MG: 20 TABLET, FILM COATED ORAL at 11:00

## 2022-06-28 RX ADMIN — MAGNESIUM SULFATE HEPTAHYDRATE 2000 MG: 40 INJECTION, SOLUTION INTRAVENOUS at 02:37

## 2022-06-28 RX ADMIN — Medication 400 MG: at 11:00

## 2022-06-28 RX ADMIN — FERROUS SULFATE TAB 325 MG (65 MG ELEMENTAL FE) 325 MG: 325 (65 FE) TAB at 08:54

## 2022-06-28 RX ADMIN — DOCUSATE SODIUM 100 MG: 100 CAPSULE, LIQUID FILLED ORAL at 08:54

## 2022-06-28 RX ADMIN — ATORVASTATIN CALCIUM 10 MG: 10 TABLET, FILM COATED ORAL at 08:54

## 2022-06-28 NOTE — PLAN OF CARE
Problem: Discharge Planning  Goal: Discharge to home or other facility with appropriate resources  6/28/2022 1044 by Russ Arciniega RN  Outcome: Completed  6/28/2022 0136 by Santi Calderon RN  Outcome: Progressing     Problem: Pain  Goal: Verbalizes/displays adequate comfort level or baseline comfort level  6/28/2022 1044 by Russ Arciniega RN  Outcome: Completed  Flowsheets (Taken 6/28/2022 0854)  Verbalizes/displays adequate comfort level or baseline comfort level: Encourage patient to monitor pain and request assistance  6/28/2022 0136 by Santi Calderon RN  Outcome: Progressing     Problem: ABCDS Injury Assessment  Goal: Absence of physical injury  Outcome: Completed Pt remains withdrawn to self, but visible in the milieu. Pt still believes that he has snakes in his body. Pt hasn't raised concerns to this writer about the snakes but he still believes they are there. PT eats his meals well and returns to his room to rest in his bed. Pt denies feelings of HI/SI. PT is given encouragement and emotional support. Pt is encouraged to seek the help[ of the staff.

## 2022-06-28 NOTE — PROGRESS NOTES
Physician Progress Note      PATIENT:               Griffin Connell  CSN #:                  [id number]  :                       1945  ADMIT DATE:       2022 11:49 AM  DISCH DATE:        2022 11:20 AM  RESPONDING  PROVIDER #:        Tessa Garrett TEXT:    Patient admitted with syncope. noted to have orthostatic hypotension, A Fib . If possible, please document in progress notes and discharge summary if you   are evaluating and/or treating any of the following: The medical record reflects the following:  Risk Factors: age, Hx CAD, DMII  Clinical Indicators: Pt standing at [facility] began to feel light headed, pale   and diaphoretic, A Fib, tilt table test negative positive for orthostatic   hypotension, CT head negative, ECHO unremarkable  Treatment: tilt table test, ECHO, labs, imaging    Thank you,  Sally QUINTANILLA,RN,[facility] ([phone])  Options provided:  -- Syncope possibly due to A Fib  -- Syncope possibly due to orthostatic hypotension  -- Other - I will add my own diagnosis  -- Disagree - Not applicable / Not valid  -- Disagree - Clinically unable to determine / Unknown  -- Refer to Clinical Documentation Reviewer    PROVIDER RESPONSE TEXT:    The syncope is possibly due to A Fib.     Query created by: Cheryle Salk on 2022 10:57 AM      Electronically signed by:  Rupali Hebert 2022 3:05 PM

## 2022-06-28 NOTE — PROGRESS NOTES
Peter Sims paged with following message. pt heart rate 40's - 50's after am meds, diiping as low as 30's briefly with irreg heart rate, pt asymptomatic, is it still ok to DC? She stated as long as he was asymptomatic after ambulating, pt ambulated 100 yards without difficulty or complaint will proceed with discharge.

## 2022-06-28 NOTE — CONSULTS
ONCOLOGY HEMATOLOGY CARE ([facility])  CONSULTATION REPORT    REASON FOR CONSULT    History of anemia, new onset afib needing AC    CHIEF COMPLAINT    Chief Complaint   Patient presents with    Atrial Fibrillation     near syncope at work        HISTORY OF PRESENT ILLNESS   José Miguel Gonzalez is a 78 y.o. male with past medical history significant for DMII, CAD, Arrhythmia, HTN, HLD, chronic diarrhea, and ANGLE who presents with near syncope while at [employer]. Reports he has had several episodes of near syncope. Feels dizzy at times. Has awareness he is about to pass out. Reports his heart races at times. EKG revealed afib. Echo 6/26/22  Left ventricular systolic function is normal.   Ejection fraction is visually estimated at 55-60%. Mild left ventricular hypertrophy. Grade II diastolic dysfunction. Sclerotic, but non-stenotic aortic valve. No evidence of any pericardial effusion. Patient is known to Dr. Shiva Adams having been seen for ANGLE and low grade MDS. He has  Bone marrow study in August of 2009 showed trilineage hematopoietic marrow, which appeared normocellular for his age. Iron stains were adequate. FISH for MDS was negative. Flow cytometry showed no monoclonality. He was found to have mild leukopenia. Due to history of ANGLE, MDS and need for [facility] we were consulted. PAST MEDICAL HISTORY    Past Medical History:   Diagnosis Date    Abdominal Doppler 07/06/2021    Normal study    Acute MI [facility])     1993 - angioplasty, no stent, Dr. John Rao     CAD (coronary artery disease)     Diabetes mellitus ([facility] [address]     Essential hypertension 08/18/2020    H/O cardiovascular stress test 04/09/2019    Normal study. H/O echocardiogram 04/18/2019    EF 55-60%, Normal study. H/O echocardiogram 12/21/2020    EF 55-60%, Mild MR & LVH.        SURGICAL HISTORY    Past Surgical History:   Procedure Laterality Date    BALLOON ANGIOPLASTY, ARTERY  02/1994    CARDIAC CATHETERIZATION      CATARACT REMOVAL TOE SURGERY         FAMILY HISTORY    Family History   Problem Relation Age of Onset    Heart Attack Father     Heart Disease Father        SOCIAL HISTORY    Social History     Socioeconomic History    Marital status:      Spouse name: None    Number of children: None    Years of education: None    Highest education level: None   Occupational History    None   Tobacco Use    Smoking status: Former Smoker    Smokeless tobacco: Never Used   Substance and Sexual Activity    Alcohol use: No     Comment: Caffeine: Drinks 10 cups caffeinated coffee daily. Drug use: No    Sexual activity: None   Other Topics Concern    None   Social History Narrative    None     Social Determinants of Health     Financial Resource Strain:     Difficulty of Paying Living Expenses: Not on file   Food Insecurity:     Worried About [address] in the Last Year: Not on file    Radha of Food in the Last Year: Not on file   Transportation Needs:     Lack of Transportation (Medical): Not on file    Lack of Transportation (Non-Medical):  Not on file   Physical Activity:     Days of Exercise per Week: Not on file    Minutes of Exercise per Session: Not on file   Stress:     Feeling of Stress : Not on file   Social Connections:     Frequency of Communication with Friends and Family: Not on file    Frequency of Social Gatherings with Friends and Family: Not on file    Attends Evangelical Services: Not on file    Active Member of Clubs or Organizations: Not on file    Attends Club or Organization Meetings: Not on file    Marital Status: Not on file   Intimate Partner Violence:     Fear of Current or Ex-Partner: Not on file    Emotionally Abused: Not on file    Physically Abused: Not on file    Sexually Abused: Not on file   Housing Stability:     Unable to Pay for Housing in the Last Year: Not on file    Number of [place] in the Last Year: Not on file    Unstable Housing in the Last Year: Not on file       REVIEW OF SYSTEMS Constitutional:  Denies fever, chills, loss of appetite, weight loss, tiredness, fatigue or weakness   HEENT:  Denies swelling of neck glands  Respiratory:  Denies cough, shortness of breath or hemoptysis  Cardiovascular:  Denies chest pain, palpitations or swelling   GI:  Denies abdominal pain, nausea, vomiting, constipation or diarrhea   Musculoskeletal:  Denies back pain   Skin:  Denies rash   Neurologic:  Denies headache, focal weakness or sensory changes   All systems negative except as marked. PHYSICAL EXAM    Vitals: /67   Pulse 58   Temp 98 °F (36.7 °C) (Oral)   Resp 14   Ht 5' 8\" (1.727 m)   Wt 156 lb (70.8 kg)   SpO2 96%   BMI 23.72 kg/m²   CONSTITUTIONAL: awake, alert, cooperative, no apparent distress   EYES:sclera clear and conjunctiva normal  ENT: Normocephalic, without obvious abnormality, atraumatic  NECK: supple, symmetrical  HEMATOLOGIC/LYMPHATIC: no cervical, supraclavicular or axillary lymphadenopathy   LUNGS: no increased work of breathing and clear to auscultation   CARDIOVASCULAR: irregular rate and rhythm, normal S1 and S2, no murmur noted  ABDOMEN: normal bowel sounds x 4, soft, non-distended, non-tender, no masses palpated, no hepatosplenomgaly   MUSCULOSKELETAL: full range of motion noted, tone is normal  NEUROLOGIC: awake, alert, oriented to name, place and time. Motor skills grossly intact. SKIN: Normal skin color, texture, turgor and no jaundice.  Skin appears intact   EXTREMITIES: no LE edema, no cyanosis, no leg swelling, no clubbing    LABORATORY RESULTS  CBC:   Recent Labs     06/26/22  1225 06/27/22  0617 06/28/22  0018   WBC 5.7 4.8 4.2   HGB 10.7* 10.3* 10.1*    186 176     BMP:    Recent Labs     06/26/22  1225 06/27/22  0617 06/28/22  0018    142 144   K 4.1 4.1 4.1    107 108   CO2 26 26 27   BUN 21 16 17   CREATININE 1.4* 1.1 1.1   GLUCOSE 172* 97 112*     Hepatic:   Recent Labs     06/26/22  1225   AST 18   ALT 11   BILITOT 0.4

## 2022-06-28 NOTE — PROGRESS NOTES
Discharge education completed with pt, pt demonstrated appropriate teach back, IV's removed, pt has prescription called in to pharmacy, medication's and side effect education completed, pt has discharge paperwork, AVS signed, pt denies any needs or questions at this time.  Pt wheeled to car by this nurse

## 2022-06-28 NOTE — CARE COORDINATION
.CM met with pt and spouse for d/c planning. Introduced self and updated white board. Pt lives with spouse and is independent with ADL's. Pt drives, has a PCP, has insurance, and is able to afford his medication. Pt denies having any DME or services. [facility] offered and pt declined. Pt denies any d/c needs at this time. D/c plan is home with spouse, no needs. Notify CM if any d/c needs arise.   TE

## 2022-06-28 NOTE — PROGRESS NOTES
Admit Date:  6/26/2022    Admission diagnosis / Complaint:  Atrial fibrillation      Subjective:  Mr. Jaciel Mao is resting in bed. Denies cardiac complaints. In Sinus rhythm on telemetry      Objective:   /67   Pulse 58   Temp 98 °F (36.7 °C) (Oral)   Resp 14   Ht 5' 8\" (1.727 m)   Wt 156 lb (70.8 kg)   SpO2 96%   BMI 23.72 kg/m²     Intake/Output Summary (Last 24 hours) at 6/28/2022 486  Last data filed at 6/27/2022 2052  Gross per 24 hour   Intake 10 ml   Output --   Net 10 ml       TELEMETRY: Sinus    has a past medical history of Abdominal Doppler, Acute MI ([facility] [address] Arthritis, CAD (coronary artery disease), Diabetes mellitus ([facility] [address] Essential hypertension, H/O cardiovascular stress test, H/O echocardiogram, and H/O echocardiogram.   has a past surgical history that includes Toe Surgery; Cataract removal; Balloon angioplasty, artery (02/1994); and Cardiac catheterization.   Physical Exam:  General:  Awake, alert, NAD  Skin:  Warm and dry  Neck:  JVD not appreicated  Chest:  Clear to auscultation, respiration easy  Cardiovascular:  Irregular rate and rhythm, frequent atrial runs, S1S2  Abdomen:  Soft nontender  Extremities:  no edema    Medications:    rivaroxaban  20 mg Oral Daily    docusate sodium  100 mg Oral Daily    insulin lispro  0-6 Units SubCUTAneous TID WC    insulin lispro  0-3 Units SubCUTAneous Nightly    amiodarone  200 mg Oral BID    aspirin  81 mg Oral Daily    atorvastatin  10 mg Oral Daily    fenofibrate  135 mg Oral Daily    ferrous sulfate  325 mg Oral Daily with breakfast    sodium chloride flush  5-40 mL IntraVENous 2 times per day    metoprolol tartrate  25 mg Oral BID      dextrose      sodium chloride       glucose, dextrose bolus **OR** dextrose bolus, glucagon (rDNA), dextrose, sodium chloride flush, sodium chloride, ondansetron **OR** ondansetron, polyethylene glycol, acetaminophen **OR** acetaminophen    Lab Data:  CBC:   Recent Labs     06/26/22  1225 06/27/22  0617 06/28/22  0018   WBC 5.7 4.8 4.2   HGB 10.7* 10.3* 10.1*   HCT 33.4* 32.1* 31.2*   MCV 95.7 95.0 94.8    186 176     BMP:   Recent Labs     06/26/22  1225 06/27/22  0617 06/28/22  0018    142 144   K 4.1 4.1 4.1    107 108   CO2 26 26 27   BUN 21 16 17   CREATININE 1.4* 1.1 1.1     LIVER PROFILE:   Recent Labs     06/26/22  1225   AST 18   ALT 11   BILITOT 0.4   ALKPHOS 52     PT/INR: No results for input(s): PROTIME, INR in the last 72 hours. APTT: No results for input(s): APTT in the last 72 hours. BNP:  No results for input(s): BNP in the last 72 hours. TROPONIN: @TROPONINI:3@      Assessment and Plan    PAF/PAT  Hypomagnesemia  Near Syncope  Anemia  Diabetes type 2  CAD      Per tele review patient in SR with PAC's and some PAT. Heart rate is controlled  Continue amiodarone 200 mg BID for 14 days, then 200 mg daily  Continue lopressor 25 mg BID  Will get outpatient event monitor to assess for further arrhythmias  Patient has followup tomorrow morning for event monitor placement. Hypomagnesemia- IV repletion received yesterday. Will start Magnesium 400 mg daily. Please continue at discharge    chads score:  HF/LV dysfunction No (0)   HTN   Yes (1)  Age greater /equal 75 No (0)   DM  Yes (1)  Stroke/TIA/TE No (0)   Vascular Disease Yes (1)  Age 74-69  Yes (1)  Sex Male  (0)     Chads score of 4  Appreciated Heme/Onc input. Ok to start [facility]. Will start xarelto 20 mg daily. Discussed with patient who agrees to starting [facility]. BP is stable. From EP standpoint patient is ok for discharge. ANNABELLA Caceres - CNP, 6/28/2022 9:07 AM     Please note this report has been partially produced using speech recognition software and may contain errors related to that system including errors in grammar, punctuation, and spelling, as well as words and phrases that may be inappropriate.  If there are any questions or concerns please feel free to contact the dictating provider for clarification.

## 2022-06-28 NOTE — DISCHARGE SUMMARY
V2.0  Discharge Summary    Name:  Rakesh Rivas /Age/Sex: 1945 (86 y.o. male)   Admit Date: 2022  Discharge Date: 22    MRN & CSN:  [id number] & [id number] Encounter Date and Time 22 9:41 AM EDT    Attending:  Tammy Harley DO Discharging Provider: Tammy Harley DO       Hospital Course:     Brief HPI: Rakesh Rivas is a 78 y.o. male who presents with Near syncope    Brief Problem Based Course:     Cardiogenic Near Syncope  Recently dx with A-fib. EKG consistent with stable A-fib. AAMIR score: 3.   -Serial troponin's negative  -Echo shows normal EF 55%, mild LVH, Sclerotic aortic valve, & grade 2 diastolic dysfunction. -EKG consistent with A fib. -Tilt table test normal.  -Heme Onc consulted and recommending to start Xarelto  - Orthostatic positive but pt asymptomatic. EP consulted. Plan for event monitor on discharge. - EP concerned for sick sinus syndrome. Amiodarone 200mg BID x 14 days then 200mg daily & Lopressor 25mg BID per EP.  -Optimize electrolytes. Had 2g of Mg IV. Started on 400mg PO daily.       Patient [facility]. ID home. Patient having sinus bradycardia. Asymptomatic. EP aware. Patient ambulatory. Heart rate increased with ambulation and standing. Mild KASSY, resolved  Peak Creat 1.4 with eGFR 50  Now near baseline.  -continue to encourage PO Hydration  -continue to hold Lisinopril and Metformin  -Avoid nephrotoxins agents  -Monitor BMP  - monitor I/Os     CAD s/p PTCA ()  -Continue ASA and Lipitor  -Was on Coreg in the past   -Continue Metoprolol as tolerated  - Amiodarone started per EP     DMII without chronic complications   Last E2S 7.4 ( 3/2022)  -Hold Metformin  -SSI and hypoglycemia protocol     Essential HTN  -Hold BP medications per cardiology recommendation     Iron deficiency anemia  Stable at 10.7. PT sees Dr. Celeste   -Continue iron tablet     HLD  -Continue Fenofibrate and Lipitor    Hypomagnesemia  Treated.   Continue oral Mg    Hx of colitis  Was on PO budesonide. Has not been taking recently and doing well off. Taking pepto bismol  Adivised to monitor for dark stools or bloody stools  Patient is started on Xarelto & takes 81mg asprin. Advised to reducce pepto as tolered. The patient expressed appropriate understanding of, and agreement with the discharge recommendations, medications, and plan. Consults this admission:  IP CONSULT TO CARDIOLOGY  IP CONSULT TO HOSPITALIST  IP CONSULT TO ELECTROPHYSIOLOGY  IP CONSULT TO HEM/ONC    Discharge Diagnosis:   Near syncope    Patient Active Problem List   Diagnosis    Cellulitis    Edema    CAD (coronary artery disease)    Diabetes mellitus ([facility] [address]    Right inguinal hernia    Anemia, unspecified    Iron deficiency anemia secondary to blood loss (chronic)    Anemia of chronic disorder    Dyslipidemia    Essential hypertension    Dizziness    Bruit of left carotid artery    Near syncope    Paroxysmal atrial fibrillation [facility])       Discharge Instruction:   Follow up appointments:   Sunitha Mendez MD  In 1 week  Post acute care  [facility]   Afshan Swift MD  In 2 weeks  A fib, Event monitor, Syncope  [address]   Fely Valentine MD  In 2 weeks  A Fib, Event monitor, Syncope  [address] [id number]  [phone]   Primary care physician: Sunitha Mendez MD within 2 weeks  Diet:  ADULT DIET; Regular;  Low Sodium (2 gm)  Activity: activity as tolerated  Disposition: Discharged to:   [x]Home, []C, []SNF, []Acute Rehab, []Hospice   Condition on discharge: Stable  Labs and Tests to be Followed up as an outpatient by PCP or Specialist: Event monitor arranged by EP    Discharge Medications:        Medication List      START taking these medications    amiodarone 200 MG tablet  Commonly known as: CORDARONE  Take 1 tablet by mouth 2 times daily Take 1 tab twice daily for 24 more doses. Then 1 tab daily.      famotidine 20 MG tablet  Commonly known as: PEPCID  Take 1 tablet by mouth daily     magnesium oxide 400 (240 Mg) MG tablet  Commonly known as: MAG-OX  Take 1 tablet by mouth daily     metoprolol tartrate 25 MG tablet  Commonly known as: LOPRESSOR  Take 1 tablet by mouth 2 times daily     rivaroxaban 20 MG Tabs tablet  Commonly known as: XARELTO  Take 1 tablet by mouth daily        CONTINUE taking these medications    aspirin 81 MG tablet     b complex vitamins capsule     bismuth subsalicylate 009 MG chewable tablet  Commonly known as: PEPTO BISMOL     CoQ-10 30 MG Caps     fenofibrate 145 MG tablet  Commonly known as: TRICOR     ferrous sulfate 325 (65 Fe) MG tablet  Commonly known as: IRON 325     Lipitor 10 MG tablet  Generic drug: atorvastatin     loperamide 2 MG tablet  Commonly known as: IMODIUM A-D     metFORMIN 500 MG extended release tablet  Commonly known as: GLUCOPHAGE-XR     OneTouch Delica Plus BLLLVH26A Misc     OneTouch Verio strip  Generic drug: blood glucose test strips     VITAMIN B 12 PO        STOP taking these medications    budesonide 3 MG extended release capsule  Commonly known as: ENTOCORT EC     lisinopril 5 MG tablet  Commonly known as: PRINIVIL;ZESTRIL     pregabalin 75 MG capsule  Commonly known as: LYRICA           Where to Get Your Medications      These medications were sent to [facility] [place], [place] - [address] [place] [address] -  [phone] - F [phone]  [address] [place] [id number]    Phone: [phone]   · amiodarone 200 MG tablet  · famotidine 20 MG tablet  · magnesium oxide 400 (240 Mg) MG tablet  · metoprolol tartrate 25 MG tablet  · rivaroxaban 20 MG Tabs tablet        Objective Findings at Discharge:   /67   Pulse 58   Temp 98 °F (36.7 °C) (Oral)   Resp 14   Ht 5' 8\" (1.727 m)   Wt 156 lb (70.8 kg)   SpO2 96%   BMI 23.72 kg/m²       Physical Exam:   General: NAD  Eyes: EOMI  ENT: neck supple  Cardiovascular: Regular rate. Respiratory: Clear to auscultation  Gastrointestinal: Soft, non tender  Genitourinary: no suprapubic tenderness  Musculoskeletal: No edema  Skin: warm, dry  Neuro: Alert. Psych: Mood appropriate. Labs and Imaging   Echocardiogram complete 2D with doppler with color    Result Date: 6/27/2022  Transthoracic Echocardiography Report (TTE)  Demographics   Patient Name       Boris Simmons      Date of Study       06/27/2022   Date of Birth      1945         Gender              Male   Age                78 year(s)         Race                   Patient Number     [id number]         Room Number         3108   Visit Number       [id number]   Corporate ID       [id number]   Accession Number   [id number]         Arcelia Barron 25,                                                            [facility]   Ordering Physician Heraclio Hair                     CNP                Physician           Braulio Armendariz MD  Procedure Type of Study   TTE procedure:ECHOCARDIOGRAM COMPLETE 2D W DOPPLER W COLOR. Procedure Date Date: 06/27/2022 Start: 08:04 AM Study Location: Portable Technical Quality: Fair visualization Indications:Syncope. Patient Status: Routine Height: 68 inches Weight: 156 pounds BSA: 1.84 m2 BMI: 23.72 kg/m2 HR: 64 bpm BP: 125/77 mmHg  Conclusions   Summary  Left ventricular systolic function is normal.  Ejection fraction is visually estimated at 55-60%. Mild left ventricular hypertrophy. Grade II diastolic dysfunction. Sclerotic, but non-stenotic aortic valve. No evidence of any pericardial effusion.    Signature   ------------------------------------------------------------------  Electronically signed by Johnson Diaz MD  (Interpreting physician) on 06/27/2022 at 01:59 PM  ------------------------------------------------------------------   Findings   Left Ventricle  Left ventricular systolic function is normal.  Ejection fraction is visually estimated at 55-60%. Mild left ventricular hypertrophy. Grade II diastolic dysfunction. Left ventricle size is normal.  No regional wall motion abnormalites. Left Atrium  Mildly dilated left atrium. Right Atrium  Essentially normal right atrium. Right Ventricle  Essentially normal right ventricle. Aortic Valve  Sclerotic, but non-stenotic aortic valve. Mitral Valve  Trace mitral regurgitation. Tricuspid Valve  Trace tricuspid regurgitation; normal RVSP. Pulmonic Valve  The pulmonic valve was not well visualized. Pericardial Effusion  No evidence of any pericardial effusion. Pleural Effusion  No evidence of pleural effusion. Miscellaneous  IVC and abdominal aorta are within normal limits.   M-Mode/2D Measurements & Calculations   LV Diastolic Dimension:  LV Systolic Dimension:  LA Dimension: 3.4 cmAO Root  2.96 cm                  2.21 cm                 Dimension: 3.1 cmLA Area:  LV FS:25.3 %             LV Volume Diastolic:    38.4 cm2  LV PW Diastolic: 4.19 cm 365 ml  Septum Diastolic: 8.43   LV Volume Systolic: 53  cm                       ml  CO: 7.41 l/min           LV EDV/LV EDV Index:    RV Diastolic Dimension:  CI: 0.78 l/m*m2          121 ml/66 m2LV ESV/LV   2.82 cm                           ESV Index: 53 ml/29 m2  LV Area Diastolic: 56.0  EF Calculated (A4C):    LA/Aorta: 1.1  cm2                      56.2 %  LV Area Systolic: 70.2   EF Calculated (2D):     LA volume/Index: 82 ml  cm2                      51.6 %                  /45m2                            LV Length: 8.85 cm                            LVOT: 2.3 cm  Doppler Measurements & Calculations   MV Peak E-Wave: 99.7  AV Peak Velocity: 152 cm/s   LVOT Peak Velocity: 122  cm/s                  AV Peak Gradient: 9.24 mmHg  cm/s  MV Peak A-Wave: 51.3  AV Mean Velocity: 101 cm/s   LVOT Mean Velocity: 73.5  cm/s                  AV Mean Gradient: 5 mmHg     cm/s  MV E/A Ratio: 1. 94    AV VTI: 32.3 cm              LVOT Peak Gradient: 6  MV Peak Gradient:     AV Area (Continuity):3.59    mmHgLVOT Mean Gradient: 3  3.98 mmHg             cm2                          mmHg   MV P1/2t: 68 msec     LVOT VTI: 27.9 cm  MVA by PHT:3.24 cm2                                                     TR Velocity:143 cm/s  MV E' Septal                                       TR Gradient:8.18 mmHg  Velocity: 8.44 cm/s                                PV Peak Velocity: 107  MV E' Lateral                                      cm/s  Velocity: 3.62 cm/s                                PV Peak Gradient: 4.58  MV E/E' septal: 11.81                              mmHg  MV E/E' lateral:  27.54                                                     FL ED Velocity: 59.7 cm/s      CT HEAD WO CONTRAST    Result Date: 6/26/2022  EXAMINATION: CT OF THE HEAD WITHOUT CONTRAST  6/26/2022 1:32 pm TECHNIQUE: CT of the head was performed without the administration of intravenous contrast. Automated exposure control, iterative reconstruction, and/or weight based adjustment of the mA/kV was utilized to reduce the radiation dose to as low as reasonably achievable. COMPARISON: None. HISTORY: ORDERING SYSTEM PROVIDED HISTORY: intermittent dizziness, syncope TECHNOLOGIST PROVIDED HISTORY: Has a \"code stroke\" or \"stroke alert\" been called? ->No Reason for exam:->intermittent dizziness, syncope Decision Support Exception - unselect if not a suspected or confirmed emergency medical condition->Emergency Medical Condition (MA) Reason for Exam: DIZZINESS FINDINGS: BRAIN/VENTRICLES: There is no acute intracranial hemorrhage, mass effect or midline shift. No abnormal extra-axial fluid collection. The gray-white differentiation is maintained without evidence of an acute infarct. There is no evidence of hydrocephalus. ORBITS: The visualized portion of the orbits demonstrate no acute abnormality.  SINUSES: The visualized paranasal sinuses and mastoid air cells demonstrate no acute abnormality. SOFT TISSUES/SKULL:  No acute abnormality of the visualized skull or soft tissues. No acute intracranial abnormality. XR CHEST PORTABLE    Result Date: 6/26/2022  EXAMINATION: ONE XRAY VIEW OF THE CHEST 6/26/2022 12:58 pm COMPARISON: October 14, 2021 HISTORY: ORDERING SYSTEM PROVIDED HISTORY: chest pain TECHNOLOGIST PROVIDED HISTORY: Reason for exam:->chest pain Reason for Exam: pt stated he got dizzy, Initial evaluation for acute chest pain FINDINGS: The cardiomediastinal silhouette is normal in size. The lungs are clear. No pleural effusion or pneumothorax is present. Stable granulomatous calcification is present on the right. No acute cardiopulmonary process. CBC:   Recent Labs     06/26/22  1225 06/27/22  0617 06/28/22  0018   WBC 5.7 4.8 4.2   HGB 10.7* 10.3* 10.1*    186 176     BMP:    Recent Labs     06/26/22  1225 06/27/22  0617 06/28/22  0018    142 144   K 4.1 4.1 4.1    107 108   CO2 26 26 27   BUN 21 16 17   CREATININE 1.4* 1.1 1.1   GLUCOSE 172* 97 112*     Hepatic:   Recent Labs     06/26/22  1225   AST 18   ALT 11   BILITOT 0.4   ALKPHOS 52     Lipids:   Lab Results   Component Value Date    CHOL 110 03/17/2014    HDL 35 03/17/2014    TRIG 73 03/17/2014     Hemoglobin A1C:   Lab Results   Component Value Date    LABA1C 6.9 06/28/2022     TSH: No results found for: TSH  Troponin:   Lab Results   Component Value Date    TROPONINT <0.010 06/27/2022    TROPONINT <0.010 06/26/2022    TROPONINT <0.010 06/26/2022     Lactic Acid: No results for input(s): LACTA in the last 72 hours.   BNP:   Recent Labs     06/26/22  1225   PROBNP 1,317*     UA:No results found for: [place], PHUR, LABCAST, WBCUA, RBCUA, MUCUS, TRICHOMONAS, YEAST, BACTERIA, CLARITYU, SPECGRAV, LEUKOCYTESUR, UROBILINOGEN, BILIRUBINUR, BLOODU, GLUCOSEU, KETUA, AMORPHOUS  Urine Cultures: No results found for: LABURIN  Blood Cultures: No results found for: BC  No results found for: BLOODCULT2  Organism: No results found for: ORG    Time Spent Discharging patient 34 minutes    Electronically signed by Valdemar Hutchins DO on 6/28/2022 at 10:33 AM

## 2022-06-28 NOTE — PROGRESS NOTES
Pt HR dipping down to 36, but will return back to 50-70s. MD notified about pt receiving first dose of oral amio and oral lopressor tonight.

## 2022-06-29 ENCOUNTER — NURSE ONLY (OUTPATIENT)
Dept: CARDIOLOGY CLINIC | Age: 77
End: 2022-06-29
Payer: MEDICARE

## 2022-06-29 DIAGNOSIS — I48.0 PAROXYSMAL ATRIAL FIBRILLATION (HCC): Primary | ICD-10-CM

## 2022-06-29 PROCEDURE — 93228 REMOTE 30 DAY ECG REV/REPORT: CPT | Performed by: INTERNAL MEDICINE

## 2022-06-29 ASSESSMENT — ENCOUNTER SYMPTOMS
PHOTOPHOBIA: 0
BACK PAIN: 0
DIARRHEA: 0
VOMITING: 0
BLOOD IN STOOL: 0
NAUSEA: 0
ABDOMINAL PAIN: 0
CONSTIPATION: 0
COLOR CHANGE: 0
SHORTNESS OF BREATH: 0
COUGH: 0
EYE PAIN: 0
WHEEZING: 0
CHEST TIGHTNESS: 0

## 2022-06-29 NOTE — PROGRESS NOTES
30 days e-cardio monitor placed.  # [id number]. Instructed patient on how to record the event and to call monitoring center at [phone] if any problems arise. Instructed patient to disconnect the lead wires from the electrodes before bathing or showering and reattach them afterwards. Instructed patient that the electrodes should be changed every 3 days or if they no longer adhere to the skin. Patient to mail package after the monitor has ended. Patient verbalized understanding.

## 2022-07-18 ENCOUNTER — TELEPHONE (OUTPATIENT)
Dept: CARDIOLOGY CLINIC | Age: 77
End: 2022-07-18

## 2022-07-19 NOTE — TELEPHONE ENCOUNTER
Returned phone call to patient. Patient is currently wearing event monitor until 7/28/2022 so follow up scheduled 8/5/2022. Patient also reports after discharge had an upset stomach to the point he wasn't eating much. States his stomach issues and eating has improved over the last 2 days. He is also now down to 1 tablet amiodarone after the 14 day loading dose of 2 tablets daily. Did offer to bring patient in for office visit sooner to evaluate, but patient states he will see if stomach issue improves. Also advised patient to be sure patient taking medication with food. Also asked patient to reach out to the office with any further questions or concerns between now and scheduled follow up. Patient voiced understanding. Patient is requesting refill for Amiodarone, Magnesium and Metoprolol and [facility]

## 2022-07-20 RX ORDER — LANOLIN ALCOHOL/MO/W.PET/CERES
400 CREAM (GRAM) TOPICAL DAILY
Qty: 90 TABLET | Refills: 1 | Status: SHIPPED | OUTPATIENT
Start: 2022-07-20 | End: 2022-10-26

## 2022-07-20 RX ORDER — AMIODARONE HYDROCHLORIDE 200 MG/1
200 TABLET ORAL DAILY
Qty: 90 TABLET | Refills: 1 | Status: SHIPPED | OUTPATIENT
Start: 2022-07-20 | End: 2022-07-28 | Stop reason: SDUPTHER

## 2022-07-28 RX ORDER — AMIODARONE HYDROCHLORIDE 200 MG/1
200 TABLET ORAL DAILY
Qty: 90 TABLET | Refills: 1 | Status: SHIPPED | OUTPATIENT
Start: 2022-07-28 | End: 2022-10-26

## 2022-07-28 RX ORDER — AMIODARONE HYDROCHLORIDE 200 MG/1
200 TABLET ORAL DAILY
Qty: 14 TABLET | Refills: 0 | Status: ON HOLD | OUTPATIENT
Start: 2022-07-28 | End: 2022-08-23 | Stop reason: SDUPTHER

## 2022-07-28 NOTE — TELEPHONE ENCOUNTER
Prescription request to be forwarded to BERNY Lainez, to review and approve. Did request 90 day with [facility] and [facility] as requested by patient.

## 2022-08-05 ENCOUNTER — OFFICE VISIT (OUTPATIENT)
Dept: CARDIOLOGY CLINIC | Age: 77
End: 2022-08-05
Payer: MEDICARE

## 2022-08-05 VITALS
DIASTOLIC BLOOD PRESSURE: 54 MMHG | SYSTOLIC BLOOD PRESSURE: 122 MMHG | HEART RATE: 52 BPM | WEIGHT: 154.6 LBS | BODY MASS INDEX: 23.51 KG/M2

## 2022-08-05 DIAGNOSIS — I49.5 SICK SINUS SYNDROME (HCC): Primary | ICD-10-CM

## 2022-08-05 PROCEDURE — 1036F TOBACCO NON-USER: CPT | Performed by: INTERNAL MEDICINE

## 2022-08-05 PROCEDURE — 99214 OFFICE O/P EST MOD 30 MIN: CPT | Performed by: INTERNAL MEDICINE

## 2022-08-05 PROCEDURE — G8420 CALC BMI NORM PARAMETERS: HCPCS | Performed by: INTERNAL MEDICINE

## 2022-08-05 PROCEDURE — G8427 DOCREV CUR MEDS BY ELIG CLIN: HCPCS | Performed by: INTERNAL MEDICINE

## 2022-08-05 PROCEDURE — 3017F COLORECTAL CA SCREEN DOC REV: CPT | Performed by: INTERNAL MEDICINE

## 2022-08-05 PROCEDURE — 1123F ACP DISCUSS/DSCN MKR DOCD: CPT | Performed by: INTERNAL MEDICINE

## 2022-08-05 ASSESSMENT — ENCOUNTER SYMPTOMS
CONSTIPATION: 0
ABDOMINAL PAIN: 0
NAUSEA: 0
SHORTNESS OF BREATH: 0
COLOR CHANGE: 0
BACK PAIN: 0
DIARRHEA: 0
PHOTOPHOBIA: 0
BLOOD IN STOOL: 0
VOMITING: 0
COUGH: 0
WHEEZING: 0
CHEST TIGHTNESS: 0
EYE PAIN: 0

## 2022-08-05 NOTE — PROGRESS NOTES
Electrophysiology fu Note      Reason for consultation: Atrial fibrillation    Chief complaint : Dizziness    Referring physician: Dr. Caitlin Medina      Primary care physician: Joseline Orr MD      History of Present Illness: This visit (8/5/2022)    Patient is having extreme fatigue. Patient also reports dizziness but did not pass out. Patient had event monitor and he was told it was abnormal and here for further assessment. Patient denies any chest pain, shortness of breath palpitations. He was recently in the hospital with dizziness and near syncope      Previous visit:     Nidia King is a 78year old male with a history of CAD, HTN, Diabetes type 2, dyslipidemia, and iron deficiency anemia presents with complaints of dizziness and near syncope. Patient reports that yesterday he was at [employer] when while standing he began to feel dizzy. He states that he made his way to a seat and still felt dizzy. He listening to the [employer] service but then asked for prayer because he wasn't feeling well. The patient ambulated to a pew where he laid down until the EMS arrived. Patient states that he doesn't think he passed out completely. He states that he was dizzy, his vision became dark but here could here everything around him. He states that after he laid down he felt better. Patient states he was told by EMS that he had atrial fibrillation. Patient states he has never been told he has afib. He denies chest pain, palpitations, shortness of breath, or edema. He states that in May he had a similar episode however he did lose consciousness for a brief period of time. He did not go to the hospital for this episode. 6/27/2022 ECHO EF 55-60%  4/9/2019 stress test reveals normal myocardial perfusion. On admission potassium is 4.1 and creatinine 1.4. Magnesium 1.8. Troponin <0.010 x3.      Pastmedical history:   Past Medical History:   Diagnosis Date    Abdominal Doppler 07/06/2021    Normal study    Acute MI [facility])     1993 - angioplasty, no stent, Dr. Maggie Kruse     CAD (coronary artery disease)     Diabetes mellitus ([facility] [address]     Essential hypertension 08/18/2020    H/O cardiovascular stress test 04/09/2019    Normal study. H/O echocardiogram 04/18/2019    EF 55-60%, Normal study. H/O echocardiogram 12/21/2020    EF 55-60%, Mild MR & LVH. Surgical history :   Past Surgical History:   Procedure Laterality Date    BALLOON ANGIOPLASTY, ARTERY  02/1994    CARDIAC CATHETERIZATION      CATARACT REMOVAL      TOE SURGERY         Family history:   Family History   Problem Relation Age of Onset    Heart Attack Father     Heart Disease Father        Social history :  reports that he has quit smoking. He has never used smokeless tobacco. He reports that he does not drink alcohol and does not use drugs. No Known Allergies    Current Outpatient Medications on File Prior to Visit   Medication Sig Dispense Refill    amiodarone (CORDARONE) 200 MG tablet Take 1 tablet by mouth in the morning. 90 tablet 1    metoprolol tartrate (LOPRESSOR) 25 MG tablet Take 1 tablet by mouth in the morning and 1 tablet before bedtime. 180 tablet 1    rivaroxaban (XARELTO) 20 MG TABS tablet Take 1 tablet by mouth in the morning. 90 tablet 1    rivaroxaban (XARELTO) 20 MG TABS tablet Take 1 tablet by mouth daily (with breakfast) (Patient not taking: Reported on 8/5/2022) 14 tablet 0    metoprolol tartrate (LOPRESSOR) 25 MG tablet Take 1 tablet by mouth in the morning and 1 tablet before bedtime. (Patient not taking: Reported on 8/5/2022) 21 tablet 0    amiodarone (CORDARONE) 200 MG tablet Take 1 tablet by mouth in the morning. (Patient not taking: Reported on 8/5/2022) 14 tablet 0    magnesium oxide (MAG-OX) 400 (240 Mg) MG tablet Take 1 tablet by mouth in the morning.  90 tablet 1    loperamide (IMODIUM A-D) 2 MG tablet Take 1 tablet by mouth 2 times daily      Lancets ([facility] [id number]) MISC       ONETOUCH VERIO strip       Coenzyme Q10 (COQ-10) 30 MG CAPS Take 30 mg by mouth daily      fenofibrate (TRICOR) 145 MG tablet Take 145 mg by mouth daily      metFORMIN (GLUCOPHAGE-XR) 500 MG extended release tablet Take 500 mg by mouth 2 times daily (with meals)       b complex vitamins capsule Take 1 capsule by mouth daily      ferrous sulfate 325 (65 Fe) MG tablet Take 325 mg by mouth daily (with breakfast)      Cyanocobalamin (VITAMIN B 12 PO) Take by mouth every other day       aspirin 81 MG tablet Take 81 mg by mouth daily       atorvastatin (LIPITOR) 10 MG tablet Take 10 mg by mouth daily. No current facility-administered medications on file prior to visit. Review of Systems:   Review of Systems   Constitutional:  Negative for activity change, chills, fatigue and fever. HENT:  Negative for congestion, ear pain and tinnitus. Eyes:  Negative for photophobia, pain and visual disturbance. Respiratory:  Negative for cough, chest tightness, shortness of breath and wheezing. Cardiovascular:  Negative for chest pain, palpitations and leg swelling. Gastrointestinal:  Negative for abdominal pain, blood in stool, constipation, diarrhea, nausea and vomiting. Endocrine: Negative for cold intolerance and heat intolerance. Genitourinary:  Negative for dysuria, flank pain and hematuria. Musculoskeletal:  Positive for arthralgias. Negative for back pain, myalgias and neck stiffness. Skin:  Negative for color change and rash. Allergic/Immunologic: Negative for food allergies. Neurological:  Positive for dizziness. Negative for light-headedness, numbness and headaches. Hematological:  Does not bruise/bleed easily. Psychiatric/Behavioral:  Negative for agitation, behavioral problems and confusion. Examination:      Vitals:    08/05/22 1057   BP: (!) 122/54   Pulse: 52   Weight: 154 lb 9.6 oz (70.1 kg)        Body mass index is 23.51 kg/m².       Physical Exam  Constitutional:       General: He is not in acute distress. Appearance: He is not ill-appearing or diaphoretic. HENT:      Head: Normocephalic and atraumatic. Right Ear: External ear normal.      Left Ear: External ear normal.      Nose: No congestion. Mouth/Throat:      Mouth: Mucous membranes are moist.   Eyes:      Extraocular Movements: Extraocular movements intact. Conjunctiva/sclera: Conjunctivae normal.      Pupils: Pupils are equal, round, and reactive to light. Cardiovascular:      Rate and Rhythm: Normal rate. Rhythm irregular. Heart sounds: No murmur heard. Comments: Frequent PAC  Pulmonary:      Effort: Pulmonary effort is normal. No respiratory distress. Breath sounds: Normal breath sounds. No wheezing or rhonchi. Abdominal:      General: Abdomen is flat. Bowel sounds are normal. There is no distension. Palpations: Abdomen is soft. Tenderness: There is no abdominal tenderness. Musculoskeletal:         General: No tenderness. Cervical back: Normal range of motion. No tenderness. Right lower leg: No edema. Left lower leg: No edema. Skin:     General: Skin is warm. Neurological:      General: No focal deficit present. Mental Status: He is alert and oriented to person, place, and time. Cranial Nerves: No cranial nerve deficit.    Psychiatric:         Mood and Affect: Mood normal.             CBC:   Lab Results   Component Value Date/Time    WBC 4.2 06/28/2022 12:18 AM    HGB 10.1 06/28/2022 12:18 AM    HCT 31.2 06/28/2022 12:18 AM     06/28/2022 12:18 AM     Lipids:  Lab Results   Component Value Date    CHOL 110 03/17/2014    TRIG 73 03/17/2014    HDL 35 (L) 03/17/2014    LDLDIRECT 61 03/17/2014     PT/INR:   Lab Results   Component Value Date/Time    INR 1.09 12/20/2013 05:42 PM        BMP:    Lab Results   Component Value Date     06/28/2022    K 4.1 06/28/2022     06/28/2022    CO2 27 06/28/2022 BUN 17 06/28/2022     CMP:   Lab Results   Component Value Date    AST 18 06/26/2022    PROT 6.9 06/26/2022    BILITOT 0.4 06/26/2022    ALKPHOS 52 06/26/2022     TSH:  No results found for: TSH, T4    EKGINTERPRETATION - EKG Interpretation: Sinus bradycardia    Echo 6/2022     Left ventricular systolic function is normal.   Ejection fraction is visually estimated at 55-60%. Mild left ventricular hypertrophy. Grade II diastolic dysfunction. Sclerotic, but non-stenotic aortic valve. No evidence of any pericardial effusion. IMPRESSION / RECOMMENDATIONS:     Sick sinus syndrome  Near syncope  Symptomatic bradycardia    1. PAF /PAT  2. Near syncope  3. CAD  4. DM-2    Patient has significant pauses of more than 3 seconds with a recent near syncopal history. He is also having symptomatic bradycardia and heart rate of 30s and 40s. Patient is extremely tired and weak. Patient also having PAF episodes intermittently with RVR. Event monitor confirms a sick sinus syndrome so given these findings pacemaker is recommended on the patient. The risks including, but not limited to, the risks of vascular injury, bleeding, infection, device malfunction, lead dislodgement, radiation exposure, injury to cardiac and surrounding structures (including pneumothorax), stroke, myocardial infarction and death were discussed in detail. The patient opted to proceed with the device implantation. Thanks again for allowing me to participate in care of this patient. Please call me if you have any questions. With best regards. Ambrosio Noel MD, 8/5/2022 11:17 AM     Please note this report has been partially produced using speech recognition software and may contain errors related to that system including errors in grammar, punctuation, and spelling, as well as words and phrases that may be inappropriate.  If there are any questions or concerns please feel free to contact the dictating provider for clarification.

## 2022-08-10 DIAGNOSIS — I25.10 CORONARY ARTERY DISEASE INVOLVING NATIVE CORONARY ARTERY OF NATIVE HEART WITHOUT ANGINA PECTORIS: Primary | ICD-10-CM

## 2022-08-18 ENCOUNTER — HOSPITAL ENCOUNTER (OUTPATIENT)
Age: 77
Discharge: HOME | End: 2022-08-18
Payer: MEDICARE

## 2022-08-18 ENCOUNTER — HOSPITAL ENCOUNTER (OUTPATIENT)
Age: 77
Setting detail: SPECIMEN
Discharge: HOME | End: 2022-08-18
Payer: MEDICARE

## 2022-08-18 ENCOUNTER — NURSE ONLY (OUTPATIENT)
Dept: CARDIOLOGY CLINIC | Age: 77
End: 2022-08-18

## 2022-08-18 ENCOUNTER — HOSPITAL ENCOUNTER (OUTPATIENT)
Dept: GENERAL RADIOLOGY | Age: 77
Discharge: HOME | End: 2022-08-18
Payer: MEDICARE

## 2022-08-18 DIAGNOSIS — Z01.810 PRE-OPERATIVE CARDIOVASCULAR EXAMINATION: ICD-10-CM

## 2022-08-18 DIAGNOSIS — Z79.01 LONG TERM (CURRENT) USE OF ANTICOAGULANTS: ICD-10-CM

## 2022-08-18 LAB
ANION GAP SERPL CALCULATED.3IONS-SCNC: 11 MMOL/L (ref 4–16)
APTT: 61.2 SECONDS (ref 25.1–37.1)
BASOPHILS ABSOLUTE: 0.1 K/CU MM
BASOPHILS RELATIVE PERCENT: 0.7 % (ref 0–1)
BUN BLDV-MCNC: 20 MG/DL (ref 6–23)
CALCIUM SERPL-MCNC: 8.6 MG/DL (ref 8.3–10.6)
CHLORIDE BLD-SCNC: 104 MMOL/L (ref 99–110)
CO2: 25 MMOL/L (ref 21–32)
CREAT SERPL-MCNC: 1.4 MG/DL (ref 0.9–1.3)
DIFFERENTIAL TYPE: ABNORMAL
EOSINOPHILS ABSOLUTE: 0.3 K/CU MM
EOSINOPHILS RELATIVE PERCENT: 4.2 % (ref 0–3)
GFR AFRICAN AMERICAN: 60 ML/MIN/1.73M2
GFR NON-AFRICAN AMERICAN: 49 ML/MIN/1.73M2
GLUCOSE BLD-MCNC: 126 MG/DL (ref 70–99)
HCT VFR BLD CALC: 28 % (ref 42–52)
HEMOGLOBIN: 8.6 GM/DL (ref 13.5–18)
IMMATURE NEUTROPHIL %: 0.4 % (ref 0–0.43)
INR BLD: 2.63 INDEX
LYMPHOCYTES ABSOLUTE: 1.2 K/CU MM
LYMPHOCYTES RELATIVE PERCENT: 17 % (ref 24–44)
MAGNESIUM: 1.8 MG/DL (ref 1.8–2.4)
MCH RBC QN AUTO: 29.7 PG (ref 27–31)
MCHC RBC AUTO-ENTMCNC: 30.7 % (ref 32–36)
MCV RBC AUTO: 96.6 FL (ref 78–100)
MONOCYTES ABSOLUTE: 0.5 K/CU MM
MONOCYTES RELATIVE PERCENT: 7.2 % (ref 0–4)
NUCLEATED RBC %: 0 %
PDW BLD-RTO: 14.1 % (ref 11.7–14.9)
PHOSPHORUS: 3.7 MG/DL (ref 2.5–4.9)
PLATELET # BLD: 352 K/CU MM (ref 140–440)
PMV BLD AUTO: 10.2 FL (ref 7.5–11.1)
POTASSIUM SERPL-SCNC: 4.6 MMOL/L (ref 3.5–5.1)
PROTHROMBIN TIME: 34.3 SECONDS (ref 11.7–14.5)
RBC # BLD: 2.9 M/CU MM (ref 4.6–6.2)
SEGMENTED NEUTROPHILS ABSOLUTE COUNT: 5 K/CU MM
SEGMENTED NEUTROPHILS RELATIVE PERCENT: 70.5 % (ref 36–66)
SODIUM BLD-SCNC: 140 MMOL/L (ref 135–145)
TOTAL IMMATURE NEUTOROPHIL: 0.03 K/CU MM
TOTAL NUCLEATED RBC: 0 K/CU MM
WBC # BLD: 7.1 K/CU MM (ref 4–10.5)

## 2022-08-18 PROCEDURE — 85610 PROTHROMBIN TIME: CPT

## 2022-08-18 PROCEDURE — 80048 BASIC METABOLIC PNL TOTAL CA: CPT

## 2022-08-18 PROCEDURE — 83735 ASSAY OF MAGNESIUM: CPT

## 2022-08-18 PROCEDURE — 36415 COLL VENOUS BLD VENIPUNCTURE: CPT

## 2022-08-18 PROCEDURE — 84100 ASSAY OF PHOSPHORUS: CPT

## 2022-08-18 PROCEDURE — 85025 COMPLETE CBC W/AUTO DIFF WBC: CPT

## 2022-08-18 PROCEDURE — U0005 INFEC AGEN DETEC AMPLI PROBE: HCPCS

## 2022-08-18 PROCEDURE — 71046 X-RAY EXAM CHEST 2 VIEWS: CPT

## 2022-08-18 PROCEDURE — U0003 INFECTIOUS AGENT DETECTION BY NUCLEIC ACID (DNA OR RNA); SEVERE ACUTE RESPIRATORY SYNDROME CORONAVIRUS 2 (SARS-COV-2) (CORONAVIRUS DISEASE [COVID-19]), AMPLIFIED PROBE TECHNIQUE, MAKING USE OF HIGH THROUGHPUT TECHNOLOGIES AS DESCRIBED BY CMS-2020-01-R: HCPCS

## 2022-08-18 PROCEDURE — 85730 THROMBOPLASTIN TIME PARTIAL: CPT

## 2022-08-19 LAB
SARS-COV-2: NOT DETECTED
SOURCE: NORMAL

## 2022-08-23 ENCOUNTER — HOSPITAL ENCOUNTER (OUTPATIENT)
Dept: CARDIAC CATH/INVASIVE PROCEDURES | Age: 77
Setting detail: OBSERVATION
Discharge: HOME | End: 2022-08-24
Attending: INTERNAL MEDICINE | Admitting: INTERNAL MEDICINE
Payer: MEDICARE

## 2022-08-23 ENCOUNTER — APPOINTMENT (OUTPATIENT)
Dept: GENERAL RADIOLOGY | Age: 77
End: 2022-08-23
Attending: INTERNAL MEDICINE
Payer: MEDICARE

## 2022-08-23 DIAGNOSIS — Z95.0 S/P PLACEMENT OF CARDIAC PACEMAKER: Primary | ICD-10-CM

## 2022-08-23 LAB
ABO/RH: NORMAL
ANTIBODY SCREEN: NEGATIVE
GLUCOSE BLD-MCNC: 125 MG/DL (ref 70–99)

## 2022-08-23 PROCEDURE — 2709999900 HC NON-CHARGEABLE SUPPLY

## 2022-08-23 PROCEDURE — 86900 BLOOD TYPING SEROLOGIC ABO: CPT

## 2022-08-23 PROCEDURE — C1785 PMKR, DUAL, RATE-RESP: HCPCS

## 2022-08-23 PROCEDURE — G0378 HOSPITAL OBSERVATION PER HR: HCPCS

## 2022-08-23 PROCEDURE — 6360000002 HC RX W HCPCS

## 2022-08-23 PROCEDURE — 33208 INSRT HEART PM ATRIAL & VENT: CPT

## 2022-08-23 PROCEDURE — 33208 INSRT HEART PM ATRIAL & VENT: CPT | Performed by: INTERNAL MEDICINE

## 2022-08-23 PROCEDURE — 2580000003 HC RX 258: Performed by: NURSE PRACTITIONER

## 2022-08-23 PROCEDURE — 82962 GLUCOSE BLOOD TEST: CPT

## 2022-08-23 PROCEDURE — 86850 RBC ANTIBODY SCREEN: CPT

## 2022-08-23 PROCEDURE — 71045 X-RAY EXAM CHEST 1 VIEW: CPT

## 2022-08-23 PROCEDURE — 6360000002 HC RX W HCPCS: Performed by: INTERNAL MEDICINE

## 2022-08-23 PROCEDURE — 2580000003 HC RX 258

## 2022-08-23 PROCEDURE — 2500000003 HC RX 250 WO HCPCS

## 2022-08-23 PROCEDURE — C1898 LEAD, PMKR, OTHER THAN TRANS: HCPCS

## 2022-08-23 PROCEDURE — 6370000000 HC RX 637 (ALT 250 FOR IP): Performed by: NURSE PRACTITIONER

## 2022-08-23 PROCEDURE — 86901 BLOOD TYPING SEROLOGIC RH(D): CPT

## 2022-08-23 PROCEDURE — 2580000003 HC RX 258: Performed by: INTERNAL MEDICINE

## 2022-08-23 RX ORDER — LOPERAMIDE HYDROCHLORIDE 2 MG/1
2 CAPSULE ORAL 2 TIMES DAILY
Status: DISCONTINUED | OUTPATIENT
Start: 2022-08-23 | End: 2022-08-24 | Stop reason: HOSPADM

## 2022-08-23 RX ORDER — SODIUM CHLORIDE 9 MG/ML
INJECTION, SOLUTION INTRAVENOUS PRN
Status: DISCONTINUED | OUTPATIENT
Start: 2022-08-23 | End: 2022-08-24 | Stop reason: HOSPADM

## 2022-08-23 RX ORDER — SODIUM CHLORIDE 0.9 % (FLUSH) 0.9 %
5-40 SYRINGE (ML) INJECTION PRN
Status: DISCONTINUED | OUTPATIENT
Start: 2022-08-23 | End: 2022-08-24 | Stop reason: HOSPADM

## 2022-08-23 RX ORDER — FENOFIBRATE 160 MG/1
160 TABLET ORAL DAILY
Status: DISCONTINUED | OUTPATIENT
Start: 2022-08-24 | End: 2022-08-24 | Stop reason: HOSPADM

## 2022-08-23 RX ORDER — METFORMIN HYDROCHLORIDE 500 MG/1
500 TABLET, EXTENDED RELEASE ORAL 2 TIMES DAILY WITH MEALS
Status: DISCONTINUED | OUTPATIENT
Start: 2022-08-23 | End: 2022-08-24 | Stop reason: HOSPADM

## 2022-08-23 RX ORDER — LANOLIN ALCOHOL/MO/W.PET/CERES
400 CREAM (GRAM) TOPICAL DAILY
Status: DISCONTINUED | OUTPATIENT
Start: 2022-08-23 | End: 2022-08-24 | Stop reason: HOSPADM

## 2022-08-23 RX ORDER — ACETAMINOPHEN 325 MG/1
650 TABLET ORAL EVERY 4 HOURS PRN
Status: DISCONTINUED | OUTPATIENT
Start: 2022-08-23 | End: 2022-08-24 | Stop reason: HOSPADM

## 2022-08-23 RX ORDER — FENOFIBRATE 54 MG/1
145 TABLET ORAL DAILY
Status: CANCELLED | OUTPATIENT
Start: 2022-08-24

## 2022-08-23 RX ORDER — ASPIRIN 81 MG/1
81 TABLET, CHEWABLE ORAL DAILY
Status: DISCONTINUED | OUTPATIENT
Start: 2022-08-24 | End: 2022-08-24 | Stop reason: HOSPADM

## 2022-08-23 RX ORDER — AMIODARONE HYDROCHLORIDE 200 MG/1
200 TABLET ORAL DAILY
Status: DISCONTINUED | OUTPATIENT
Start: 2022-08-24 | End: 2022-08-24 | Stop reason: HOSPADM

## 2022-08-23 RX ORDER — ATORVASTATIN CALCIUM 10 MG/1
10 TABLET, FILM COATED ORAL NIGHTLY
Status: DISCONTINUED | OUTPATIENT
Start: 2022-08-23 | End: 2022-08-24 | Stop reason: HOSPADM

## 2022-08-23 RX ORDER — FERROUS SULFATE 325(65) MG
325 TABLET ORAL
Status: DISCONTINUED | OUTPATIENT
Start: 2022-08-24 | End: 2022-08-24 | Stop reason: HOSPADM

## 2022-08-23 RX ORDER — SODIUM CHLORIDE 0.9 % (FLUSH) 0.9 %
5-40 SYRINGE (ML) INJECTION EVERY 12 HOURS SCHEDULED
Status: DISCONTINUED | OUTPATIENT
Start: 2022-08-23 | End: 2022-08-24 | Stop reason: HOSPADM

## 2022-08-23 RX ORDER — FENOFIBRATE 54 MG/1
145 TABLET ORAL DAILY
Status: DISCONTINUED | OUTPATIENT
Start: 2022-08-24 | End: 2022-08-23 | Stop reason: CLARIF

## 2022-08-23 RX ADMIN — METOPROLOL TARTRATE 25 MG: 25 TABLET, FILM COATED ORAL at 21:14

## 2022-08-23 RX ADMIN — ATORVASTATIN CALCIUM 10 MG: 10 TABLET, FILM COATED ORAL at 21:15

## 2022-08-23 RX ADMIN — Medication 10 ML: at 21:13

## 2022-08-23 RX ADMIN — LOPERAMIDE HYDROCHLORIDE 2 MG: 2 CAPSULE ORAL at 21:14

## 2022-08-23 RX ADMIN — CEFAZOLIN 2000 MG: 2 INJECTION, POWDER, FOR SOLUTION INTRAMUSCULAR; INTRAVENOUS at 21:13

## 2022-08-23 ASSESSMENT — ENCOUNTER SYMPTOMS
SHORTNESS OF BREATH: 0
CONSTIPATION: 0
NAUSEA: 0
DIARRHEA: 0
COUGH: 0
BLOOD IN STOOL: 0
ABDOMINAL PAIN: 0
WHEEZING: 0
COLOR CHANGE: 0
PHOTOPHOBIA: 0
VOMITING: 0
BACK PAIN: 0
EYE PAIN: 0
CHEST TIGHTNESS: 0

## 2022-08-23 NOTE — OP NOTE
[facility]     Electrophysiology Procedure Note       Date of Procedure: 8/23/2022  Patient's Name: Johnson Nunes  YOB: 1945   Medical Record Number: [id number]    Procedure Performed by: Papito Valdivia MD    Procedures performed:  Insertion of right ventricular pacing lead under fluoroscopy. Insertion of right atrial lead under fluoroscopy  Insertion of a Dual chamber Pacemaker. Electronic analysis of lead and device. IV sedation. Selective venography of left upper extremity. Sedation : Versed, Fentanyl    Blood loss : 20 cc    Indication of the procedure: Sick sinus syndrome    Brief History:      Johnson Nunes is a 78 y.o. male with symptomatic bradycardia and sick sinus syndrome here for pacemaker implantation      Details of procedure: The patient was brought to the electrophysiology laboratory in stable condition. The patient was in a fasting and non-sedated state. The risks, benefits and alternatives of the procedure were discussed with the patient. The risks including, but not limited to, the risks of vascular injury, bleeding, infection, device malfunction, lead dislodgement, radiation exposure, injury to cardiac and surrounding structures (including pneumothorax), stroke, myocardial infarction and death were discussed in detail. The patient opted to proceed with the device implantation. Written informed consent was signed and placed in the chart. Prophylactic antibiotic was given. The patient was prepped and draped in a sterile fashion. A timeout protocol was completed to identify the patient and the procedure being performed. IV sedation was provided with IV Versed, Fentanyl. Left upper extremity venogram was obtained to assess the patency of the subclavian vein and for access under fluoroscopic guidance. An incision was made in the left pectoral area after administration of lidocaine.  Using electrocautery and blunt dissection, a pocket was created. Central venous access into the left subclavian vein was obtained using the modified Seldinger technique. After central venous access was obtained, a sheath was placed in the vein. A right ventricular lead was advanced into position in the RV apex under fluoroscopic guidance and using a series of curved stylets. The lead was actively fixated. After confirming appropriate function, the sheath was split and removed. The lead was secured to the underlying tissue using suture material. A new sheath was advanced over a second previously placed wire into the vein. The atrial lead was advanced to the right atrial appendage and actively fixated under fluoroscopic guidance. After confirming appropriate function, the sheath was split and removed. The lead was secured to the underlying tissue using suture. The pocket was irrigated with an antibiotic solution. The leads were then connected to the new pulse generator which was then placed into the cleaned pocket. The pocket was then closed in two separate subcutaneous layers using 2-0 & 2-0 Vicryl and subcuticular layer using 4-0 Vicryl. The skin was covered with Steri-Strips and dressing. All sponge and needle counts were reported as correct at the end of the procedure. The patient tolerated the procedure well and there were no complications. Patient was transported to the holding area in stable condition. Lead and device information:           Plan:     The patient will be in observation status and have usual post-implant care, including chest x-ray, and antibiotic therapy and interrogation of the device.       Electronically signed by Garcia Astorga MD on 8/23/2022 at 11:42 AM

## 2022-08-23 NOTE — H&P
Electrophysiology H&P Note      Reason for consultation: Atrial fibrillation    Chief complaint : Dizziness    Referring physician: Dr. Shari Barahona      Primary care physician: Yessy Crowell MD      History of Present Illness: Today visit (08/23/22)    Patient here for PACEMAKER IMPLANTATION. No change in H&P noted from previous clinic visit. Previous visit (8/5/2022)    Patient is having extreme fatigue. Patient also reports dizziness but did not pass out. Patient had event monitor and he was told it was abnormal and here for further assessment. Patient denies any chest pain, shortness of breath palpitations. He was recently in the hospital with dizziness and near syncope      Previous visit:     Mariela Duff is a 78year old male with a history of CAD, HTN, Diabetes type 2, dyslipidemia, and iron deficiency anemia presents with complaints of dizziness and near syncope. Patient reports that yesterday he was at [employer] when while standing he began to feel dizzy. He states that he made his way to a seat and still felt dizzy. He listening to the [employer] service but then asked for prayer because he wasn't feeling well. The patient ambulated to a pew where he laid down until the EMS arrived. Patient states that he doesn't think he passed out completely. He states that he was dizzy, his vision became dark but here could here everything around him. He states that after he laid down he felt better. Patient states he was told by EMS that he had atrial fibrillation. Patient states he has never been told he has afib. He denies chest pain, palpitations, shortness of breath, or edema. He states that in May he had a similar episode however he did lose consciousness for a brief period of time. He did not go to the hospital for this episode. 6/27/2022 ECHO EF 55-60%  4/9/2019 stress test reveals normal myocardial perfusion. On admission potassium is 4.1 and creatinine 1.4. Magnesium 1.8. Troponin <0.010 x3. Pastmedical history:   Past Medical History:   Diagnosis Date    Abdominal Doppler 07/06/2021    Normal study    Acute MI [facility])     1993 - angioplasty, no stent, Dr. Jonas Benjamin     CAD (coronary artery disease)     Diabetes mellitus ([facility] [address]     Essential hypertension 08/18/2020    H/O cardiovascular stress test 04/09/2019    Normal study. H/O echocardiogram 04/18/2019    EF 55-60%, Normal study. H/O echocardiogram 12/21/2020    EF 55-60%, Mild MR & LVH. Surgical history :   Past Surgical History:   Procedure Laterality Date    BALLOON ANGIOPLASTY, ARTERY  02/1994    CARDIAC CATHETERIZATION      CATARACT REMOVAL      TOE SURGERY         Family history:   Family History   Problem Relation Age of Onset    Heart Attack Father     Heart Disease Father        Social history :  reports that he has quit smoking. He has never used smokeless tobacco. He reports that he does not drink alcohol and does not use drugs. No Known Allergies    No current facility-administered medications on file prior to encounter. Current Outpatient Medications on File Prior to Encounter   Medication Sig Dispense Refill    amiodarone (CORDARONE) 200 MG tablet Take 1 tablet by mouth in the morning. 90 tablet 1    metoprolol tartrate (LOPRESSOR) 25 MG tablet Take 1 tablet by mouth in the morning and 1 tablet before bedtime. 180 tablet 1    rivaroxaban (XARELTO) 20 MG TABS tablet Take 1 tablet by mouth in the morning. 90 tablet 1    rivaroxaban (XARELTO) 20 MG TABS tablet Take 1 tablet by mouth daily (with breakfast) (Patient not taking: Reported on 8/5/2022) 14 tablet 0    metoprolol tartrate (LOPRESSOR) 25 MG tablet Take 1 tablet by mouth in the morning and 1 tablet before bedtime. (Patient not taking: Reported on 8/5/2022) 21 tablet 0    amiodarone (CORDARONE) 200 MG tablet Take 1 tablet by mouth in the morning.  (Patient not taking: Reported on 8/5/2022) 14 tablet 0    magnesium oxide (MAG-OX) 400 (240 Mg) MG tablet Take 1 tablet by mouth in the morning. 90 tablet 1    loperamide (IMODIUM A-D) 2 MG tablet Take 1 tablet by mouth 2 times daily      Lancets (ONETOUCH DELICA PLUS [id number]) MISC       ONETOUCH VERIO strip       Coenzyme Q10 (COQ-10) 30 MG CAPS Take 30 mg by mouth daily      fenofibrate (TRICOR) 145 MG tablet Take 145 mg by mouth daily      metFORMIN (GLUCOPHAGE-XR) 500 MG extended release tablet Take 500 mg by mouth 2 times daily (with meals)       b complex vitamins capsule Take 1 capsule by mouth daily      ferrous sulfate 325 (65 Fe) MG tablet Take 325 mg by mouth daily (with breakfast)      Cyanocobalamin (VITAMIN B 12 PO) Take by mouth every other day       aspirin 81 MG tablet Take 81 mg by mouth daily       atorvastatin (LIPITOR) 10 MG tablet Take 10 mg by mouth daily. Review of Systems:   Review of Systems   Constitutional:  Negative for activity change, chills, fatigue and fever. HENT:  Negative for congestion, ear pain and tinnitus. Eyes:  Negative for photophobia, pain and visual disturbance. Respiratory:  Negative for cough, chest tightness, shortness of breath and wheezing. Cardiovascular:  Negative for chest pain, palpitations and leg swelling. Gastrointestinal:  Negative for abdominal pain, blood in stool, constipation, diarrhea, nausea and vomiting. Endocrine: Negative for cold intolerance and heat intolerance. Genitourinary:  Negative for dysuria, flank pain and hematuria. Musculoskeletal:  Positive for arthralgias. Negative for back pain, myalgias and neck stiffness. Skin:  Negative for color change and rash. Allergic/Immunologic: Negative for food allergies. Neurological:  Positive for dizziness. Negative for light-headedness, numbness and headaches. Hematological:  Does not bruise/bleed easily. Psychiatric/Behavioral:  Negative for agitation, behavioral problems and confusion.           Examination:      There were no vitals filed for this visit. There is no height or weight on file to calculate BMI. Physical Exam  Constitutional:       General: He is not in acute distress. Appearance: He is not ill-appearing or diaphoretic. HENT:      Head: Normocephalic and atraumatic. Right Ear: External ear normal.      Left Ear: External ear normal.      Nose: No congestion. Mouth/Throat:      Mouth: Mucous membranes are moist.   Eyes:      Extraocular Movements: Extraocular movements intact. Conjunctiva/sclera: Conjunctivae normal.      Pupils: Pupils are equal, round, and reactive to light. Cardiovascular:      Rate and Rhythm: Normal rate. Rhythm irregular. Heart sounds: No murmur heard. Comments: Frequent PAC  Pulmonary:      Effort: Pulmonary effort is normal. No respiratory distress. Breath sounds: Normal breath sounds. No wheezing or rhonchi. Abdominal:      General: Abdomen is flat. Bowel sounds are normal. There is no distension. Palpations: Abdomen is soft. Tenderness: There is no abdominal tenderness. Musculoskeletal:         General: No tenderness. Cervical back: Normal range of motion. No tenderness. Right lower leg: No edema. Left lower leg: No edema. Skin:     General: Skin is warm. Neurological:      General: No focal deficit present. Mental Status: He is alert and oriented to person, place, and time. Cranial Nerves: No cranial nerve deficit.    Psychiatric:         Mood and Affect: Mood normal.             CBC:   Lab Results   Component Value Date/Time    WBC 7.1 08/18/2022 02:40 PM    HGB 8.6 08/18/2022 02:40 PM    HCT 28.0 08/18/2022 02:40 PM     08/18/2022 02:40 PM     Lipids:  Lab Results   Component Value Date    CHOL 110 03/17/2014    TRIG 73 03/17/2014    HDL 35 (L) 03/17/2014    LDLDIRECT 61 03/17/2014     PT/INR:   Lab Results   Component Value Date/Time    INR 2.63 08/18/2022 02:40 PM        BMP:    Lab Results Component Value Date     08/18/2022    K 4.6 08/18/2022     08/18/2022    CO2 25 08/18/2022    BUN 20 08/18/2022     CMP:   Lab Results   Component Value Date    AST 18 06/26/2022    PROT 6.9 06/26/2022    BILITOT 0.4 06/26/2022    ALKPHOS 52 06/26/2022     TSH:  No results found for: TSH, T4    EKGINTERPRETATION - EKG Interpretation: Sinus bradycardia    Echo 6/2022     Left ventricular systolic function is normal.   Ejection fraction is visually estimated at 55-60%. Mild left ventricular hypertrophy. Grade II diastolic dysfunction. Sclerotic, but non-stenotic aortic valve. No evidence of any pericardial effusion. IMPRESSION / RECOMMENDATIONS:     Sick sinus syndrome  Near syncope  Symptomatic bradycardia    1. PAF /PAT  2. Near syncope  3. CAD  4. DM-2    Patient has significant pauses of more than 3 seconds with a recent near syncopal history. He is also having symptomatic bradycardia and heart rate of 30s and 40s. Patient is extremely tired and weak. Patient also having PAF episodes intermittently with RVR. Event monitor confirms a sick sinus syndrome so given these findings pacemaker is recommended on the patient. The risks including, but not limited to, the risks of vascular injury, bleeding, infection, device malfunction, lead dislodgement, radiation exposure, injury to cardiac and surrounding structures (including pneumothorax), stroke, myocardial infarction and death were discussed in detail. The patient opted to proceed with the device implantation. Thanks again for allowing me to participate in care of this patient. Please call me if you have any questions. With best regards.       Diogenes Vu MD, 8/23/2022 9:15 AM     Please note this report has been partially produced using speech recognition software and may contain errors related to that system including errors in grammar, punctuation, and spelling, as well as words and phrases that may be

## 2022-08-23 NOTE — PROGRESS NOTES
Received report from Bette at [facility], patient arrives at 1230 to unit

## 2022-08-24 ENCOUNTER — TELEPHONE (OUTPATIENT)
Dept: CARDIOLOGY CLINIC | Age: 77
End: 2022-08-24

## 2022-08-24 VITALS
BODY MASS INDEX: 22.96 KG/M2 | TEMPERATURE: 98 F | HEART RATE: 63 BPM | WEIGHT: 155 LBS | RESPIRATION RATE: 18 BRPM | OXYGEN SATURATION: 92 % | SYSTOLIC BLOOD PRESSURE: 119 MMHG | HEIGHT: 69 IN | DIASTOLIC BLOOD PRESSURE: 47 MMHG

## 2022-08-24 PROCEDURE — 2580000003 HC RX 258: Performed by: NURSE PRACTITIONER

## 2022-08-24 PROCEDURE — 6360000002 HC RX W HCPCS: Performed by: INTERNAL MEDICINE

## 2022-08-24 PROCEDURE — 2580000003 HC RX 258: Performed by: INTERNAL MEDICINE

## 2022-08-24 PROCEDURE — 6370000000 HC RX 637 (ALT 250 FOR IP): Performed by: NURSE PRACTITIONER

## 2022-08-24 PROCEDURE — 93280 PM DEVICE PROGR EVAL DUAL: CPT | Performed by: INTERNAL MEDICINE

## 2022-08-24 PROCEDURE — G0378 HOSPITAL OBSERVATION PER HR: HCPCS

## 2022-08-24 RX ORDER — TRAMADOL HYDROCHLORIDE 50 MG/1
50 TABLET ORAL EVERY 8 HOURS PRN
Qty: 9 TABLET | Refills: 0 | Status: SHIPPED | OUTPATIENT
Start: 2022-08-24 | End: 2022-08-27

## 2022-08-24 RX ORDER — DOXYCYCLINE HYCLATE 100 MG
100 TABLET ORAL 2 TIMES DAILY
Qty: 10 TABLET | Refills: 0 | Status: SHIPPED | OUTPATIENT
Start: 2022-08-24 | End: 2022-08-29

## 2022-08-24 RX ADMIN — ASPIRIN 81 MG CHEWABLE TABLET 81 MG: 81 TABLET CHEWABLE at 09:32

## 2022-08-24 RX ADMIN — CEFAZOLIN 2000 MG: 2 INJECTION, POWDER, FOR SOLUTION INTRAMUSCULAR; INTRAVENOUS at 04:51

## 2022-08-24 RX ADMIN — FERROUS SULFATE TAB 325 MG (65 MG ELEMENTAL FE) 325 MG: 325 (65 FE) TAB at 09:31

## 2022-08-24 RX ADMIN — Medication 10 ML: at 09:33

## 2022-08-24 RX ADMIN — AMIODARONE HYDROCHLORIDE 200 MG: 200 TABLET ORAL at 09:32

## 2022-08-24 RX ADMIN — LOPERAMIDE HYDROCHLORIDE 2 MG: 2 CAPSULE ORAL at 09:32

## 2022-08-24 RX ADMIN — METFORMIN HYDROCHLORIDE 500 MG: 500 TABLET, EXTENDED RELEASE ORAL at 09:31

## 2022-08-24 RX ADMIN — FENOFIBRATE 160 MG: 160 TABLET ORAL at 09:32

## 2022-08-24 NOTE — PROGRESS NOTES
Outpatient Pharmacy Progress Note for Meds-to-Beds    Total number of Prescriptions Filled: 2  The following medications were dispensed to the patient during the discharge process:  Tramadol  Doxycycline    Additional Documentation:  Patient's family member picked-up the medication(s) in the OP Pharmacy (wife)      Thank you for letting us serve your patients.   [address] [place]    [address], [address] [facility]    Phone: [phone]    Fax: [phone]

## 2022-08-24 NOTE — TELEPHONE ENCOUNTER
Spoke to patient and advised him he would have to call Dr. Jah Pineda office to set up an appointment since he will be a new patient and they will need some information from him, I gave him the phone number and patient will call to set up the appointment.

## 2022-08-24 NOTE — DISCHARGE SUMMARY
[facility]  Discharge Summary    Clementina Bryant  :  1945  MRN:  [id number]    Admit date:  2022  Discharge date:      Admitting Physician:  Ranulfo Fuentes MD    Discharge Diagnoses:     1. SP Dual Chamber pacemaker  2. Symptomatic Bradycardia  3. Sick Sinus Syndrome      Admission Condition:  fair    Discharged Condition:  good    Hospital Course:   Patient with hx of symptomatic bradycardia and sick sinus syndrome presented for implantation of dual chamber pacemaker. Patient tolerated the procedure well. Observed overnight. Patient device area was clean, no hematoma and no tenderness. Patient device interrogation was stable. CXR confirmed placement of device with no complications. Patient stable for discharge with out patient follow up. Patient note to have rash on lower leg. Will start Doxycylcine 100 mg BID for 5 days. Will get patient scheduled for office follow up with PCP. Current Discharge Medication List             Details   doxycycline hyclate (VIBRA-TABS) 100 MG tablet Take 1 tablet by mouth 2 times daily for 5 days  Qty: 10 tablet, Refills: 0                Details   amiodarone (CORDARONE) 200 MG tablet Take 1 tablet by mouth in the morning. Qty: 90 tablet, Refills: 1      metoprolol tartrate (LOPRESSOR) 25 MG tablet Take 1 tablet by mouth in the morning and 1 tablet before bedtime. Qty: 180 tablet, Refills: 1      rivaroxaban (XARELTO) 20 MG TABS tablet Take 1 tablet by mouth in the morning. Qty: 90 tablet, Refills: 1      magnesium oxide (MAG-OX) 400 (240 Mg) MG tablet Take 1 tablet by mouth in the morning.   Qty: 90 tablet, Refills: 1      loperamide (IMODIUM A-D) 2 MG tablet Take 1 tablet by mouth 2 times daily      Lancets (ONETOUCH DELICA PLUS [id number]) MISC       ONETOUCH VERIO strip       Coenzyme Q10 (COQ-10) 30 MG CAPS Take 30 mg by mouth daily      fenofibrate (TRICOR) 145 MG tablet Take 145 mg by mouth daily      metFORMIN (GLUCOPHAGE-XR) 500 MG extended release tablet Take

## 2022-08-24 NOTE — CARE COORDINATION
Pt lives with his spouse. Pt is independent of his ADLs. Pt has a PCP and has insurance to help with health care cost.  No needs identified at this time. CM available if needed.

## 2022-08-24 NOTE — DISCHARGE INSTRUCTIONS
Restart xarelto tomorrow 8/24/2022    1. Avoid raising the arm above shoulder for 4 weeks  2. No driving for 10 days  3. No lifting more than 10 lbs with the left hand  4. Do not wet the area of surgery for 10 days  5. Follow up appointment with Doctor for wound check in 10 days  6. Notify Doctor if pain, fever, chills, swelling or bleeding in the surgical area  7. Please avoid sleeping on the surgical side. 8. Please do not allow anyone other than Dr Isaac reynoso to remove or redress the surgical site. If the dressing were to fall off or fall partially off before the 10 day follow up appointment, please call the office ASAP.

## 2022-08-24 NOTE — PLAN OF CARE
Problem: Chronic Conditions and Co-morbidities  Goal: Patient's chronic conditions and co-morbidity symptoms are monitored and maintained or improved  Outcome: Completed     Problem: Discharge Planning  Goal: Discharge to home or other facility with appropriate resources  Outcome: Completed

## 2022-09-02 ENCOUNTER — NURSE ONLY (OUTPATIENT)
Dept: CARDIOLOGY CLINIC | Age: 77
End: 2022-09-02

## 2022-09-02 NOTE — PROGRESS NOTES
Patient here for 10-day follow-up status post implantation of pacemaker  Old dressing removed. Site is well approximated. No drainage noted  Site was cleaned in sterile fashion.   Steri-Strips applied to incision site  Patient educated to continue icing site as it is edematous and bruised  There is no hematoma  Discussed with patient that he is able to drive and shower  Voiced understanding  Patient to follow-up in 1 month

## 2022-09-20 ENCOUNTER — TELEPHONE (OUTPATIENT)
Dept: CARDIOLOGY CLINIC | Age: 77
End: 2022-09-20

## 2022-09-20 NOTE — LETTER
Cardiology [address] [facility] [address]  Phone: [phone]  Fax: [phone]    9/20/2022        Yong Robbins  [address] [place] [id number]            Dear Richard Messing: This is your [facility] schedule. You can nubia your calendar with these dates. Remember that your device is wireless and should automatically do these checks while you are sleeping. If for any reason I do not get your transmission then I will call you and ask that you send a manual transmission. If you have any questions or concerns, please call and ask for Amie Mendoza at [phone]. Thank you.

## 2022-09-22 ENCOUNTER — OFFICE (OUTPATIENT)
Dept: URBAN - METROPOLITAN AREA CLINIC 18 | Facility: CLINIC | Age: 77
End: 2022-09-22
Payer: COMMERCIAL

## 2022-09-22 VITALS
HEART RATE: 72 BPM | SYSTOLIC BLOOD PRESSURE: 122 MMHG | HEIGHT: 69 IN | DIASTOLIC BLOOD PRESSURE: 52 MMHG | WEIGHT: 160 LBS

## 2022-09-22 DIAGNOSIS — K52.832 LYMPHOCYTIC COLITIS: ICD-10-CM

## 2022-09-22 PROCEDURE — 99213 OFFICE O/P EST LOW 20 MIN: CPT | Performed by: INTERNAL MEDICINE

## 2022-09-27 ENCOUNTER — OFFICE VISIT (OUTPATIENT)
Dept: CARDIOLOGY CLINIC | Age: 77
End: 2022-09-27

## 2022-09-27 VITALS
WEIGHT: 156.8 LBS | BODY MASS INDEX: 23.22 KG/M2 | SYSTOLIC BLOOD PRESSURE: 128 MMHG | HEART RATE: 76 BPM | DIASTOLIC BLOOD PRESSURE: 60 MMHG | HEIGHT: 69 IN

## 2022-09-27 DIAGNOSIS — Z95.0 S/P PLACEMENT OF CARDIAC PACEMAKER: Primary | ICD-10-CM

## 2022-09-27 DIAGNOSIS — I48.0 PAF (PAROXYSMAL ATRIAL FIBRILLATION) (HCC): ICD-10-CM

## 2022-09-27 PROCEDURE — 99024 POSTOP FOLLOW-UP VISIT: CPT | Performed by: NURSE PRACTITIONER

## 2022-09-27 NOTE — PATIENT INSTRUCTIONS
**It is YOUR responsibilty to bring medication bottles and/or updated medication list to [address] [place] [address]. This will allow us to better serve you and all your healthcare needs**  Please be informed that if you contact our office outside of normal business hours the physician on call cannot help with any scheduling or rescheduling issues, procedure instruction questions or any type of medication issue. We advise you for any urgent/emergency that you go to the nearest emergency room! PLEASE CALL OUR OFFICE DURING NORMAL BUSINESS HOURS    Monday - Friday   8 am to 5 pm    [place]: [address]: [phone]    [place]:  [address] Laboratory Locations - No appointment necessary. Sites open Monday to Friday. Call your preferred location for test preparation, business hours and other information you need. [employer] [facility] [employer]. [facility] [address] Daniel Loyola, [address] [facility]  Phone: [phone] [facility] [address] [facility]  [address].   Anastacia garcia [address]  Phone: [phone]

## 2022-09-27 NOTE — PROGRESS NOTES
Patient here today for 4-week follow-up status post implantation of dual-chamber pacemaker  Patient reports has been feeling well. Patient denies chest pain, palpitations, shortness of breath, lightheadedness, dizziness, edema or syncope. Left upper chest site is well approximated. No redness no swelling no hematoma    Device Assessment:    The device is Medtronic pacemaker - Dual Chamber chamber      MRI Compatible : yes    Device interrogation was performed. Mode: AAIR --- DDDR    Sensing is normal. Impedence is normal.  Threshold is normal.    There has not been interval changes. Estimated battery life is 11.5 years     The underlying rhythm is AP,VS.  96.7 % atrial paced; 2.5 % ventricular paced. Atrial Arrhythmia : 2.3%    Non sustained VT episodes : No    Sustained VT episodes : No    Patient activity reported 2.3 hrs/day    The patient is pacemaker dependent. Patient noted to have 19 minutes of afib  Continue amiodarone 200 mg daily  Continue xarelto 20 mg daily  Continue lopressor 25 mg BID    Patient to follow up in 4 months with EP  Patient to follow up with Cardiology as scheduled. Patient will need amiodarone labs at next office visit.

## 2022-10-01 PROCEDURE — 93296 REM INTERROG EVL PM/IDS: CPT | Performed by: INTERNAL MEDICINE

## 2022-10-01 PROCEDURE — 93294 REM INTERROG EVL PM/LDLS PM: CPT | Performed by: INTERNAL MEDICINE

## 2022-10-05 ENCOUNTER — PROCEDURE VISIT (OUTPATIENT)
Dept: CARDIOLOGY CLINIC | Age: 77
End: 2022-10-05
Payer: MEDICARE

## 2022-10-05 DIAGNOSIS — I44.0 AV BLOCK, 1ST DEGREE: ICD-10-CM

## 2022-10-05 DIAGNOSIS — Z95.0 CARDIAC PACEMAKER IN SITU: Primary | ICD-10-CM

## 2022-10-05 DIAGNOSIS — I49.5 SINUS NODE DYSFUNCTION (HCC): ICD-10-CM

## 2022-10-20 ENCOUNTER — TELEPHONE (OUTPATIENT)
Dept: CARDIOLOGY CLINIC | Age: 77
End: 2022-10-20

## 2022-10-20 NOTE — TELEPHONE ENCOUNTER
Patient called stating that he was seen by his PCP for diabetic check up today and the doctor saw blood in his eyes and said he believes it is due to blood thinner. Please call him back at ph# [phone].

## 2022-10-24 NOTE — PROGRESS NOTES
Patient Name: Hazel Scott  Patient : 1945  Patient MRN: [id number]     Primary Oncologist: Rene Mora MD  Referring Provider: Elva Crawford MD     Date of Service: 10/26/2022      Chief Complaint:   Chief Complaint   Patient presents with    Follow-up     He came in for follow-up visit. Patient Active Problem List:     Edema     CAD (coronary artery disease)     Diabetes mellitus      Right inguinal hernia     Anemia, unspecified     Iron deficiency anemia secondary to blood loss (chronic)     Anemia of chronic disorder     Dyslipidemia     Essential hypertension     HPI:   Sweetie Bonilla is a pleasant 53-year-old male patient whom I have been following for chronic anemia since 2009. He had GI workup, including colonoscopy, EGD, capsule endoscopic study in  by Dr. Melissa Jamil. He was found to have diverticulosis. Bone marrow study in 2009 showed trilineage hematopoietic marrow, which appeared normocellular for his age. Iron stains were adequate. FISH for MDS was negative. Flow cytometry showed no monoclonality. He was found to have mild leukopenia. He had surgery on his right toe in 2013 and was hospitalized in 2013 with cellulitis to right lower extremity. He was treated with antibiotic. MRI of the right lower extremity at the time showed findings compatible with cellulitis. Blood test in 2014 showed white cell count of 5.1, hemoglobin 11.3, platelet 978, iron was 96, TIBC 369, transferrin saturation 26, ferritin 105. He had stool guaiac by Dr. Jeremy Perez in May 2014. He had umbilical hernia surgery in 2014 by Dr. Pedro Brewster. Blood test in 2014 showed white cell count 4.8, hemoglobin 11.4, platelet 235. Iron was 95, ferritin 102, transferrin saturation 31. Thyroid functions were within normal limits. He had colonoscopy in March or 2015. Reportedly, he could have slight inflammation to his colon.   He had his stool checked for C. diff, which was negative. Blood test in August 2015 showed white cell count 4.9, hemoglobin 10.9, platelet 536. Ferritin was 92. Blood test on February 10, 2016, revealed white cell count 4.5, hemoglobin 10.7, hematocrit 33.5, platelet 684, ferritin 96, stable. He stopped taking ASA for three months. Stool guaiac times three in February 2016 were negative. He restarted iron pill on August 8, 2016. Blood test on August 8, 2016, revealed white cell count 4.7, hemoglobin 10.2, platelet 030. Ferritin was 73. From my point of view, he can take his iron pill twice a day. Blood test improved on February 3, 2017, with hemoglobin 11.4. White cell was 4.3, platelet 230. Ferritin was 89, B12 218, with normal methylmalonic acid. I recommended he also take B12 supplements orally. Blood test result was reviewed and he was recommended to take B12 supplements 1,000 mcg a day over the counter. He has been taking this since August 2017. Blood tests in November 2017:  intrinsic factor antibody was negative. Methylmalonic acid, B12 were within normal limits. TSH was within normal limits. CBC   He is a . Blood tests in May 2019 reviewed. Hg dropped slightly. ECHO and stress test in May [address]6635 were OK. Labs in November 2019 were stable and reviewed. I will defer bone marrow study. He takes B-complex, B-12 pill and Iron pill. He takes 81 mg ASA. Blood test in May 2020 was stable to him. CBC on November 13, 2020 showed WBC 5.7, hemoglobin 12.2, platelet 403. He had the Covid vaccine. He had colonoscopy in 2020 and reportedly there was no polyp. He also had capsule endoscopic study. Recent labs in May 2021 were reviewed. Ferritin was 93, WBC 6.0, hemoglobin 10.7, MCV 94.2, platelet 659. Review of labs from December 3, 2021 revealed WBC 5.4, hemoglobin 10.7, hematocrit 32.9, MCV 93.2, platelets 650.  CMP grossly unremarkable, ferritin 106, iron 85 TIBC 340, transferrin percent 25 vitamin B12 608, folate 14.7. He takes iron pill daily as well as baby aspirin. In June 2022 Hg 10.7, MCV 96.3. Ferritin 85, Folate >20 and B-12 750.5. He takes daily Iron pill and ASA. On 10/26/2022 he was referred back for worsening anemia. Hg in October 2022 by PCP was 7.4. He is currently on xarelto for Afib since 7/2022. He is also ASA. 10/26/2022 Hg 6.7, .6. I advise to call for iron study report in AM. If ferritin is low, I will schedule iron infusion. I recommend to discuss with cardiologist about xarelto and ASA. He feels tired easily. He is agreeable to blood  transfusion. No acute pain. Denied any nausea, vomiting or diarrhea. No fever or chills. No chest pain, shortness of breath or palpitation. No headache or dizzy spell. No specific bone pain. No melena or hematochezia. Denied any dysuria or hematuria. PAST MEDICAL HISTORY:  Heart disease, chronic arthritis, diabetes. Chronic anemia. He had surgery on his nose in February 2020. FAMILY HISTORY  No blood disorder in the family. No history of malignancy. SOCIAL HISTORY:   He quit smoking in 1993. No history of alcohol abuse. Denied illicit drug use. LABORATORY STUDIES:   Labs in August 2012 showed white cell count 4.4, hemoglobin 11.4, hematocrit 32.1, platelet 573,510. Ferritin 63, iron 69, TIBC 362, transferrin saturation 19 percent. May 2013:  Ferritin 56, iron 68, iron-binding 361, transferrin saturation 19 percent, B12 324. White cell remains stable at 4.2, hemoglobin 11.8, hematocrit 34.5, platelet 381. Review of Systems: \"Per interval history; otherwise 10 point ROS is negative. \"     Vital Signs:  /60 (Site: Right Upper Arm, Position: Sitting, Cuff Size: Medium Adult)   Pulse 75   Temp 97.7 °F (36.5 °C) (Infrared)   Ht 5' 9\" (1.753 m)   Wt 156 lb 9.6 oz (71 kg)   SpO2 98%   BMI 23.13 kg/m²     Physical Exam:  CONSTITUTIONAL: awake, alert, cooperative, no apparent distress   EYES: pupils equal & round, sclera clear and + pallor   ENT: Normocephalic, without obvious abnormality, atraumatic  NECK: supple, symmetrical, no jugular venous distension and no carotid bruits   HEMATOLOGIC/LYMPHATIC: no cervical, supraclavicular or axillary lymphadenopathy   LUNGS: no increased work of breathing and clear to auscultation   CARDIOVASCULAR: regular rate and rhythm, normal S1 and S2, no murmur noted  ABDOMEN: normal bowel sounds, soft, non-distended, non-tender, no masses palpated, no hepatosplenomegaly   MUSCULOSKELETAL: full range of motion noted, tone is normal  NEUROLOGIC: Motor skills grossly intact. CN II - XII grossly intact  SKIN: No jaundice. Dry skin  EXTREMITIES: no LE edema. No cyanosis. Homans negative.     Labs:  Hematology:  Lab Results   Component Value Date    WBC 7.1 08/18/2022    RBC 2.90 (L) 08/18/2022    HGB 8.6 (L) 08/18/2022    HCT 28.0 (L) 08/18/2022    MCV 96.6 08/18/2022    MCH 29.7 08/18/2022    MCHC 30.7 (L) 08/18/2022    RDW 14.1 08/18/2022     08/18/2022    MPV 10.2 08/18/2022    SEGSPCT 70.5 (H) 08/18/2022    EOSRELPCT 4.2 (H) 08/18/2022    BASOPCT 0.7 08/18/2022    LYMPHOPCT 17.0 (L) 08/18/2022    MONOPCT 7.2 (H) 08/18/2022    SEGSABS 5.0 08/18/2022    EOSABS 0.3 08/18/2022    BASOSABS 0.1 08/18/2022    LYMPHSABS 1.2 08/18/2022    MONOSABS 0.5 08/18/2022    DIFFTYPE AUTOMATED DIFFERENTIAL 08/18/2022     Lab Results   Component Value Date    ESR 26 (H) 12/20/2013     Chemistry:  Lab Results   Component Value Date     08/18/2022    K 4.6 08/18/2022     08/18/2022    CO2 25 08/18/2022    BUN 20 08/18/2022    CREATININE 1.4 (H) 08/18/2022    GLUCOSE 126 (H) 08/18/2022    CALCIUM 8.6 08/18/2022    PROT 6.9 06/26/2022    LABALBU 4.1 06/26/2022    BILITOT 0.4 06/26/2022    ALKPHOS 52 06/26/2022    AST 18 06/26/2022    ALT 11 06/26/2022    LABGLOM 49 (L) 08/18/2022    GFRAA 60 (L) 08/18/2022    PHOS 3.7 08/18/2022    MG 1.8 08/18/2022    POCGLU 125 (H) 08/23/2022 Lab Results   Component Value Date    MMA 0.25 11/09/2017     Lab Results   Component Value Date    TSHHS 0.998 06/27/2022    T4FREE 1.15 11/12/2014     Immunology:  Lab Results   Component Value Date    PROT 6.9 06/26/2022     Coagulation Panel:  Lab Results   Component Value Date    PROTIME 34.3 (H) 08/18/2022    INR 2.63 08/18/2022    APTT 61.2 (H) 08/18/2022     Anemia Panel:  Lab Results   Component Value Date    FVAPOXTW50 750.5 06/03/2022    FOLATE >20.0 (H) 06/03/2022     Tumor Markers:  Lab Results   Component Value Date    PSA 0.67 12/14/2013      Observations:  No data recorded     Assessment & Plan:  1. He has mild anemia. Bone marrow study in 2009 showed normo cellular marrow for his age. He could have a low-grade myelodysplasia and iron deficiency. He had colonoscopy in March or April 2015 by Dr. Dariel Fraire. B-12, methylmalonic acid were within normal limits. Blood test in May was reviewed. Hg dropped slightly. Labs in November 2019 were reviewed and stable. Blood test in May 2020 was stable to him. CBC in November 2020 was stable. Labs in May 2021 were reviewed and stable. Labs in June 2022 were stable for him. He came in 10/2022 due to worsening anemia. He started on xarelto in July 2022 due to A-fib. 10/26/2022 CBC reviewed and I scheduled for one unit PRBC. I advised to call tomorrow for Iron study. If ferritin is low, I will schedule iron infusion. We will continue to monitor CBC and Iron study. 2. I advised him to keep his age-appropriate cancer screening up to date. He had colonoscopy in 2020 which reportedly was negative for polyp. He had colonoscopy in February 2022 with polyp removal. 5 years follow up. He started on Creon by Amy BERNAL. 3. A-fib. S/p Pace maker. On xarelto and ASA. I recommend to discuss with cardiologist about xarelto and ASA since he has worsening Hg. We discussed about healthy diet and lifestyle. Angie Sena   He had both Covid vaccine    RTC in December 2022 or sooner. All of his questions have been answered for today. Recent imaging and labs were reviewed and discussed with the patient.

## 2022-10-26 ENCOUNTER — CLINICAL DOCUMENTATION (OUTPATIENT)
Dept: ONCOLOGY | Age: 77
End: 2022-10-26

## 2022-10-26 ENCOUNTER — TELEPHONE (OUTPATIENT)
Dept: CARDIOLOGY CLINIC | Age: 77
End: 2022-10-26

## 2022-10-26 ENCOUNTER — HOSPITAL ENCOUNTER (OUTPATIENT)
Dept: INFUSION THERAPY | Age: 77
Discharge: HOME | End: 2022-10-26
Payer: MEDICARE

## 2022-10-26 ENCOUNTER — OFFICE VISIT (OUTPATIENT)
Dept: ONCOLOGY | Age: 77
End: 2022-10-26
Payer: MEDICARE

## 2022-10-26 VITALS
HEIGHT: 69 IN | HEART RATE: 75 BPM | WEIGHT: 156.6 LBS | TEMPERATURE: 97.7 F | BODY MASS INDEX: 23.19 KG/M2 | OXYGEN SATURATION: 98 % | DIASTOLIC BLOOD PRESSURE: 60 MMHG | SYSTOLIC BLOOD PRESSURE: 138 MMHG

## 2022-10-26 DIAGNOSIS — D64.9 ANEMIA, UNSPECIFIED TYPE: ICD-10-CM

## 2022-10-26 DIAGNOSIS — D64.9 ANEMIA, UNSPECIFIED TYPE: Primary | ICD-10-CM

## 2022-10-26 LAB
BASOPHILS ABSOLUTE: 0 K/CU MM
BASOPHILS RELATIVE PERCENT: 0.5 % (ref 0–1)
DIFFERENTIAL TYPE: ABNORMAL
EOSINOPHILS ABSOLUTE: 0.2 K/CU MM
EOSINOPHILS RELATIVE PERCENT: 2.6 % (ref 0–3)
FERRITIN: 36 NG/ML (ref 30–400)
HCT VFR BLD CALC: 23 % (ref 42–52)
HEMOGLOBIN: 6.7 GM/DL (ref 13.5–18)
IRON: 130 UG/DL (ref 59–158)
LYMPHOCYTES ABSOLUTE: 1.6 K/CU MM
LYMPHOCYTES RELATIVE PERCENT: 26.1 % (ref 24–44)
MCH RBC QN AUTO: 30.2 PG (ref 27–31)
MCHC RBC AUTO-ENTMCNC: 29.1 % (ref 32–36)
MCV RBC AUTO: 103.6 FL (ref 78–100)
MONOCYTES ABSOLUTE: 0.4 K/CU MM
MONOCYTES RELATIVE PERCENT: 6.9 % (ref 0–4)
PCT TRANSFERRIN: 35 % (ref 10–44)
PDW BLD-RTO: 18.6 % (ref 11.7–14.9)
PLATELET # BLD: 258 K/CU MM (ref 140–440)
PMV BLD AUTO: 9.2 FL (ref 7.5–11.1)
RBC # BLD: 2.22 M/CU MM (ref 4.6–6.2)
SEGMENTED NEUTROPHILS ABSOLUTE COUNT: 3.9 K/CU MM
SEGMENTED NEUTROPHILS RELATIVE PERCENT: 63.9 % (ref 36–66)
TOTAL IRON BINDING CAPACITY: 372 UG/DL (ref 250–450)
UNSATURATED IRON BINDING CAPACITY: 242 UG/DL (ref 110–370)
WBC # BLD: 6.1 K/CU MM (ref 4–10.5)

## 2022-10-26 PROCEDURE — 3074F SYST BP LT 130 MM HG: CPT | Performed by: INTERNAL MEDICINE

## 2022-10-26 PROCEDURE — 83550 IRON BINDING TEST: CPT

## 2022-10-26 PROCEDURE — 86900 BLOOD TYPING SEROLOGIC ABO: CPT

## 2022-10-26 PROCEDURE — 86922 COMPATIBILITY TEST ANTIGLOB: CPT

## 2022-10-26 PROCEDURE — 99214 OFFICE O/P EST MOD 30 MIN: CPT | Performed by: INTERNAL MEDICINE

## 2022-10-26 PROCEDURE — 3017F COLORECTAL CA SCREEN DOC REV: CPT | Performed by: INTERNAL MEDICINE

## 2022-10-26 PROCEDURE — 3078F DIAST BP <80 MM HG: CPT | Performed by: INTERNAL MEDICINE

## 2022-10-26 PROCEDURE — 99211 OFF/OP EST MAY X REQ PHY/QHP: CPT

## 2022-10-26 PROCEDURE — G8427 DOCREV CUR MEDS BY ELIG CLIN: HCPCS | Performed by: INTERNAL MEDICINE

## 2022-10-26 PROCEDURE — 86901 BLOOD TYPING SEROLOGIC RH(D): CPT

## 2022-10-26 PROCEDURE — 83540 ASSAY OF IRON: CPT

## 2022-10-26 PROCEDURE — 1124F ACP DISCUSS-NO DSCNMKR DOCD: CPT | Performed by: INTERNAL MEDICINE

## 2022-10-26 PROCEDURE — G8420 CALC BMI NORM PARAMETERS: HCPCS | Performed by: INTERNAL MEDICINE

## 2022-10-26 PROCEDURE — 1036F TOBACCO NON-USER: CPT | Performed by: INTERNAL MEDICINE

## 2022-10-26 PROCEDURE — 36415 COLL VENOUS BLD VENIPUNCTURE: CPT

## 2022-10-26 PROCEDURE — 86850 RBC ANTIBODY SCREEN: CPT

## 2022-10-26 PROCEDURE — 85025 COMPLETE CBC W/AUTO DIFF WBC: CPT

## 2022-10-26 PROCEDURE — G8484 FLU IMMUNIZE NO ADMIN: HCPCS | Performed by: INTERNAL MEDICINE

## 2022-10-26 PROCEDURE — 82728 ASSAY OF FERRITIN: CPT

## 2022-10-26 RX ORDER — EPINEPHRINE 1 MG/ML
0.3 INJECTION, SOLUTION, CONCENTRATE INTRAVENOUS PRN
Status: CANCELLED | OUTPATIENT
Start: 2022-10-26

## 2022-10-26 RX ORDER — FAMOTIDINE 10 MG/ML
20 INJECTION, SOLUTION INTRAVENOUS
Status: CANCELLED | OUTPATIENT
Start: 2022-10-26

## 2022-10-26 RX ORDER — SODIUM CHLORIDE 0.9 % (FLUSH) 0.9 %
5-40 SYRINGE (ML) INJECTION PRN
Status: CANCELLED | OUTPATIENT
Start: 2022-10-26

## 2022-10-26 RX ORDER — ONDANSETRON 2 MG/ML
8 INJECTION INTRAMUSCULAR; INTRAVENOUS
Status: CANCELLED | OUTPATIENT
Start: 2022-10-26

## 2022-10-26 RX ORDER — ACETAMINOPHEN 325 MG/1
650 TABLET ORAL
Status: CANCELLED | OUTPATIENT
Start: 2022-10-26

## 2022-10-26 RX ORDER — DIPHENHYDRAMINE HCL 25 MG
25 TABLET ORAL ONCE
Status: CANCELLED | OUTPATIENT
Start: 2022-10-26 | End: 2022-10-26

## 2022-10-26 RX ORDER — DIPHENHYDRAMINE HYDROCHLORIDE 50 MG/ML
50 INJECTION INTRAMUSCULAR; INTRAVENOUS
Status: CANCELLED | OUTPATIENT
Start: 2022-10-26

## 2022-10-26 RX ORDER — METHYLPREDNISOLONE SODIUM SUCCINATE 40 MG/ML
20 INJECTION, POWDER, LYOPHILIZED, FOR SOLUTION INTRAMUSCULAR; INTRAVENOUS ONCE
Status: CANCELLED
Start: 2022-10-26 | End: 2022-10-26

## 2022-10-26 RX ORDER — ACETAMINOPHEN 325 MG/1
650 TABLET ORAL ONCE
Status: CANCELLED | OUTPATIENT
Start: 2022-10-26 | End: 2022-10-26

## 2022-10-26 RX ORDER — SODIUM CHLORIDE 9 MG/ML
20 INJECTION, SOLUTION INTRAVENOUS CONTINUOUS
Status: CANCELLED | OUTPATIENT
Start: 2022-10-26

## 2022-10-26 RX ORDER — SODIUM CHLORIDE 9 MG/ML
25 INJECTION, SOLUTION INTRAVENOUS PRN
Status: CANCELLED | OUTPATIENT
Start: 2022-10-26

## 2022-10-26 RX ORDER — SODIUM CHLORIDE 9 MG/ML
INJECTION, SOLUTION INTRAVENOUS CONTINUOUS
Status: CANCELLED | OUTPATIENT
Start: 2022-10-26

## 2022-10-26 RX ORDER — ALBUTEROL SULFATE 90 UG/1
4 AEROSOL, METERED RESPIRATORY (INHALATION) PRN
Status: CANCELLED | OUTPATIENT
Start: 2022-10-26

## 2022-10-26 NOTE — PROGRESS NOTES
Hgb 6.7; per Dr Reina Hines 1 unit of PRBC, called OP Infusion Dept, spoke to Shiva Abbiedeysi, patient scheduled for tomorrow, Thurs. 10/27 @ 1300, patient advised of appointment time and signed consent, blood banded and sent for T&S, band on patient (verifed), also advised to keep band on until they receive their infusion and patient states understanding.

## 2022-10-26 NOTE — PROGRESS NOTES
MA Rooming Questions  Patient: aRe Eubanks  MRN: [id number]    Date: 10/26/2022        1. Do you have any new issues?   no         2. Do you need any refills on medications?    no    3. Have you had any imaging done since your last visit?   no    4. Have you been hospitalized or seen in the emergency room since your last visit here?   yes - [facility] in August 2022    5. Did the patient have a depression screening completed today?  No    No data recorded     PHQ-9 Given to (if applicable):               PHQ-9 Score (if applicable):                     [] Positive     []  Negative              Does question #9 need addressed (if applicable)                     [] Yes    []  No               Rosa Maria Orourke CMA

## 2022-10-26 NOTE — PROGRESS NOTES
Patient scheduled for blood transfusion at [facility] OP infusion on 10/27/2022 @ 1300. Order placed for 1 unit PRBC per physician order. Blood therapy plan added.

## 2022-10-26 NOTE — TELEPHONE ENCOUNTER
Patient called Ino Guzman wants him to stop his Baby Asprin he has a bleed , he is getting a transfusion 10/27

## 2022-10-27 ENCOUNTER — CLINICAL DOCUMENTATION (OUTPATIENT)
Dept: ONCOLOGY | Age: 77
End: 2022-10-27

## 2022-10-27 ENCOUNTER — HOSPITAL ENCOUNTER (OUTPATIENT)
Dept: INFUSION THERAPY | Age: 77
Setting detail: INFUSION SERIES
Discharge: HOME | End: 2022-10-27
Payer: MEDICARE

## 2022-10-27 VITALS
SYSTOLIC BLOOD PRESSURE: 142 MMHG | TEMPERATURE: 97.5 F | RESPIRATION RATE: 14 BRPM | DIASTOLIC BLOOD PRESSURE: 66 MMHG | OXYGEN SATURATION: 98 % | HEART RATE: 66 BPM

## 2022-10-27 DIAGNOSIS — D63.8 ANEMIA OF CHRONIC DISORDER: Primary | ICD-10-CM

## 2022-10-27 PROCEDURE — 6360000002 HC RX W HCPCS: Performed by: INTERNAL MEDICINE

## 2022-10-27 PROCEDURE — 99211 OFF/OP EST MAY X REQ PHY/QHP: CPT

## 2022-10-27 PROCEDURE — 2580000003 HC RX 258: Performed by: INTERNAL MEDICINE

## 2022-10-27 PROCEDURE — P9016 RBC LEUKOCYTES REDUCED: HCPCS

## 2022-10-27 PROCEDURE — 6370000000 HC RX 637 (ALT 250 FOR IP): Performed by: INTERNAL MEDICINE

## 2022-10-27 PROCEDURE — 96374 THER/PROPH/DIAG INJ IV PUSH: CPT

## 2022-10-27 PROCEDURE — 86901 BLOOD TYPING SEROLOGIC RH(D): CPT

## 2022-10-27 PROCEDURE — 36430 TRANSFUSION BLD/BLD COMPNT: CPT

## 2022-10-27 RX ORDER — SODIUM CHLORIDE 9 MG/ML
25 INJECTION, SOLUTION INTRAVENOUS PRN
Status: CANCELLED | OUTPATIENT
Start: 2022-10-27

## 2022-10-27 RX ORDER — FAMOTIDINE 10 MG/ML
20 INJECTION, SOLUTION INTRAVENOUS
Status: CANCELLED | OUTPATIENT
Start: 2022-10-27

## 2022-10-27 RX ORDER — DIPHENHYDRAMINE HCL 25 MG
25 TABLET ORAL ONCE
Status: CANCELLED | OUTPATIENT
Start: 2022-10-27 | End: 2022-10-27

## 2022-10-27 RX ORDER — DIPHENHYDRAMINE HCL 25 MG
25 TABLET ORAL ONCE
Status: COMPLETED | OUTPATIENT
Start: 2022-10-27 | End: 2022-10-27

## 2022-10-27 RX ORDER — ACETAMINOPHEN 325 MG/1
650 TABLET ORAL ONCE
Status: CANCELLED | OUTPATIENT
Start: 2022-10-27 | End: 2022-10-27

## 2022-10-27 RX ORDER — METHYLPREDNISOLONE SODIUM SUCCINATE 40 MG/ML
20 INJECTION, POWDER, LYOPHILIZED, FOR SOLUTION INTRAMUSCULAR; INTRAVENOUS ONCE
Status: CANCELLED
Start: 2022-10-27 | End: 2022-10-27

## 2022-10-27 RX ORDER — ACETAMINOPHEN 325 MG/1
650 TABLET ORAL ONCE
Status: COMPLETED | OUTPATIENT
Start: 2022-10-27 | End: 2022-10-27

## 2022-10-27 RX ORDER — SODIUM CHLORIDE 9 MG/ML
20 INJECTION, SOLUTION INTRAVENOUS CONTINUOUS
Status: CANCELLED | OUTPATIENT
Start: 2022-10-27

## 2022-10-27 RX ORDER — SODIUM CHLORIDE 0.9 % (FLUSH) 0.9 %
5-40 SYRINGE (ML) INJECTION PRN
Status: CANCELLED | OUTPATIENT
Start: 2022-10-27

## 2022-10-27 RX ORDER — EPINEPHRINE 1 MG/ML
0.3 INJECTION, SOLUTION, CONCENTRATE INTRAVENOUS PRN
Status: CANCELLED | OUTPATIENT
Start: 2022-10-27

## 2022-10-27 RX ORDER — METHYLPREDNISOLONE SODIUM SUCCINATE 40 MG/ML
20 INJECTION, POWDER, LYOPHILIZED, FOR SOLUTION INTRAMUSCULAR; INTRAVENOUS ONCE
Status: COMPLETED | OUTPATIENT
Start: 2022-10-27 | End: 2022-10-27

## 2022-10-27 RX ORDER — ALBUTEROL SULFATE 90 UG/1
4 AEROSOL, METERED RESPIRATORY (INHALATION) PRN
Status: CANCELLED | OUTPATIENT
Start: 2022-10-27

## 2022-10-27 RX ORDER — ACETAMINOPHEN 325 MG/1
650 TABLET ORAL
Status: CANCELLED | OUTPATIENT
Start: 2022-10-27

## 2022-10-27 RX ORDER — DIPHENHYDRAMINE HYDROCHLORIDE 50 MG/ML
50 INJECTION INTRAMUSCULAR; INTRAVENOUS
Status: CANCELLED | OUTPATIENT
Start: 2022-10-27

## 2022-10-27 RX ORDER — ONDANSETRON 2 MG/ML
8 INJECTION INTRAMUSCULAR; INTRAVENOUS
Status: CANCELLED | OUTPATIENT
Start: 2022-10-27

## 2022-10-27 RX ORDER — SODIUM CHLORIDE 0.9 % (FLUSH) 0.9 %
5-40 SYRINGE (ML) INJECTION PRN
Status: DISCONTINUED | OUTPATIENT
Start: 2022-10-27 | End: 2022-10-27

## 2022-10-27 RX ORDER — SODIUM CHLORIDE 9 MG/ML
INJECTION, SOLUTION INTRAVENOUS CONTINUOUS
Status: CANCELLED | OUTPATIENT
Start: 2022-10-27

## 2022-10-27 RX ORDER — SODIUM CHLORIDE 9 MG/ML
20 INJECTION, SOLUTION INTRAVENOUS CONTINUOUS
Status: DISCONTINUED | OUTPATIENT
Start: 2022-10-27 | End: 2022-10-27

## 2022-10-27 RX ADMIN — SODIUM CHLORIDE, PRESERVATIVE FREE 10 ML: 5 INJECTION INTRAVENOUS at 15:33

## 2022-10-27 RX ADMIN — SODIUM CHLORIDE 20 ML/HR: 9 INJECTION, SOLUTION INTRAVENOUS at 13:18

## 2022-10-27 RX ADMIN — METHYLPREDNISOLONE SODIUM SUCCINATE 20 MG: 40 INJECTION, POWDER, FOR SOLUTION INTRAMUSCULAR; INTRAVENOUS at 13:19

## 2022-10-27 RX ADMIN — DIPHENHYDRAMINE HYDROCHLORIDE 25 MG: 25 TABLET ORAL at 13:14

## 2022-10-27 RX ADMIN — ACETAMINOPHEN 650 MG: 325 TABLET ORAL at 13:14

## 2022-10-27 NOTE — PROGRESS NOTES
Tolerated transfusion well. Reviewed discharge instruction, voiced understanding. Copies of AVS given. Pt discharged home. Pt to exit via steady gait.     Orders Placed This Encounter   Medications    0.9 % sodium chloride infusion    sodium chloride flush 0.9 % injection 5-40 mL    acetaminophen (TYLENOL) tablet 650 mg    diphenhydrAMINE (BENADRYL) tablet 25 mg    methylPREDNISolone sodium (SOLU-MEDROL) injection 20 mg

## 2022-10-27 NOTE — PROGRESS NOTES
Attempted to call patient @ [phone] to offer iron infusion per physician instruction; however, no answer. VM left with this RN direct number requesting call back.

## 2022-10-28 DIAGNOSIS — D50.0 IRON DEFICIENCY ANEMIA SECONDARY TO BLOOD LOSS (CHRONIC): Primary | ICD-10-CM

## 2022-10-28 DIAGNOSIS — K90.9 INTESTINAL MALABSORPTION, UNSPECIFIED TYPE: ICD-10-CM

## 2022-10-28 LAB
ABO/RH: NORMAL
ANTIBODY SCREEN: NEGATIVE
COMPONENT: NORMAL
CROSSMATCH RESULT: NORMAL
STATUS: NORMAL
TRANSFUSION STATUS: NORMAL
UNIT DIVISION: 0
UNIT NUMBER: NORMAL

## 2022-10-28 RX ORDER — EPINEPHRINE 1 MG/ML
0.3 INJECTION, SOLUTION, CONCENTRATE INTRAVENOUS PRN
Status: CANCELLED | OUTPATIENT
Start: 2022-10-28

## 2022-10-28 RX ORDER — ONDANSETRON 2 MG/ML
8 INJECTION INTRAMUSCULAR; INTRAVENOUS
Status: CANCELLED | OUTPATIENT
Start: 2022-10-28

## 2022-10-28 RX ORDER — DEXAMETHASONE SODIUM PHOSPHATE 10 MG/ML
10 INJECTION INTRAMUSCULAR; INTRAVENOUS ONCE
Status: CANCELLED
Start: 2022-10-28 | End: 2022-10-28

## 2022-10-28 RX ORDER — SODIUM CHLORIDE 9 MG/ML
INJECTION, SOLUTION INTRAVENOUS CONTINUOUS
Status: CANCELLED | OUTPATIENT
Start: 2022-10-28

## 2022-10-28 RX ORDER — ACETAMINOPHEN 325 MG/1
650 TABLET ORAL
Status: CANCELLED | OUTPATIENT
Start: 2022-10-28

## 2022-10-28 RX ORDER — ACETAMINOPHEN 325 MG/1
650 TABLET ORAL ONCE
Status: CANCELLED
Start: 2022-10-28 | End: 2022-10-28

## 2022-10-28 RX ORDER — HEPARIN SODIUM (PORCINE) LOCK FLUSH IV SOLN 100 UNIT/ML 100 UNIT/ML
500 SOLUTION INTRAVENOUS PRN
Status: CANCELLED | OUTPATIENT
Start: 2022-10-28

## 2022-10-28 RX ORDER — FAMOTIDINE 10 MG/ML
20 INJECTION, SOLUTION INTRAVENOUS
Status: CANCELLED | OUTPATIENT
Start: 2022-10-28

## 2022-10-28 RX ORDER — ALBUTEROL SULFATE 90 UG/1
4 AEROSOL, METERED RESPIRATORY (INHALATION) PRN
Status: CANCELLED | OUTPATIENT
Start: 2022-10-28

## 2022-10-28 RX ORDER — DIPHENHYDRAMINE HYDROCHLORIDE 50 MG/ML
50 INJECTION INTRAMUSCULAR; INTRAVENOUS
Status: CANCELLED | OUTPATIENT
Start: 2022-10-28

## 2022-10-28 RX ORDER — SODIUM CHLORIDE 0.9 % (FLUSH) 0.9 %
5-40 SYRINGE (ML) INJECTION PRN
Status: CANCELLED | OUTPATIENT
Start: 2022-10-28

## 2022-10-28 RX ORDER — SODIUM CHLORIDE 9 MG/ML
5-250 INJECTION, SOLUTION INTRAVENOUS PRN
Status: CANCELLED | OUTPATIENT
Start: 2022-10-28

## 2022-10-28 NOTE — PROGRESS NOTES
Patient left VM regarding iron infusion. Patient needs iron infusion for iron deficiency anemia d/t chronic blood loss. Order for infed and NN referral placed. Therapy plan added. Attempted to call back patient @ [phone] to notify; however, no answer.  left with this RN direct number. Explained that once medication approved by insurance, patient will be scheduled for infusion and notified of appointment time.

## 2022-10-31 ENCOUNTER — TELEPHONE (OUTPATIENT)
Dept: ONCOLOGY | Age: 77
End: 2022-10-31

## 2022-10-31 NOTE — TELEPHONE ENCOUNTER
Attempted to call patient @ [phone] to address patient symptoms; however, no answer at this time. VM left with this RN direct number requesting call back.

## 2022-11-02 ENCOUNTER — TELEPHONE (OUTPATIENT)
Dept: ONCOLOGY | Age: 77
End: 2022-11-02

## 2022-11-02 NOTE — TELEPHONE ENCOUNTER
Called patient to schedule iron infusion, patient is scheduled for Thurs.  11/03 @ 0815, advised patient to notify us asap if unable to keep their appt time and if appt is No Showed or No Call, rescheduled appt may take up to 3 months to schedule, also advised that 1 visitor allowed at time of infusion, patient states understanding

## 2022-11-03 ENCOUNTER — HOSPITAL ENCOUNTER (OUTPATIENT)
Dept: INFUSION THERAPY | Age: 77
Discharge: HOME | End: 2022-11-03
Payer: MEDICARE

## 2022-11-03 VITALS
BODY MASS INDEX: 23.19 KG/M2 | SYSTOLIC BLOOD PRESSURE: 139 MMHG | DIASTOLIC BLOOD PRESSURE: 63 MMHG | OXYGEN SATURATION: 100 % | TEMPERATURE: 98.1 F | WEIGHT: 156.6 LBS | HEIGHT: 69 IN

## 2022-11-03 DIAGNOSIS — K90.9 INTESTINAL MALABSORPTION, UNSPECIFIED TYPE: Primary | ICD-10-CM

## 2022-11-03 DIAGNOSIS — D50.0 IRON DEFICIENCY ANEMIA SECONDARY TO BLOOD LOSS (CHRONIC): ICD-10-CM

## 2022-11-03 PROCEDURE — 96365 THER/PROPH/DIAG IV INF INIT: CPT

## 2022-11-03 PROCEDURE — 2580000003 HC RX 258: Performed by: INTERNAL MEDICINE

## 2022-11-03 PROCEDURE — 6370000000 HC RX 637 (ALT 250 FOR IP): Performed by: INTERNAL MEDICINE

## 2022-11-03 PROCEDURE — 96367 TX/PROPH/DG ADDL SEQ IV INF: CPT

## 2022-11-03 PROCEDURE — 6360000002 HC RX W HCPCS: Performed by: INTERNAL MEDICINE

## 2022-11-03 PROCEDURE — 96366 THER/PROPH/DIAG IV INF ADDON: CPT

## 2022-11-03 RX ORDER — SODIUM CHLORIDE 9 MG/ML
INJECTION, SOLUTION INTRAVENOUS CONTINUOUS
OUTPATIENT
Start: 2022-11-08

## 2022-11-03 RX ORDER — SODIUM CHLORIDE 0.9 % (FLUSH) 0.9 %
5-40 SYRINGE (ML) INJECTION PRN
OUTPATIENT
Start: 2022-11-08

## 2022-11-03 RX ORDER — EPINEPHRINE 1 MG/ML
0.3 INJECTION, SOLUTION, CONCENTRATE INTRAVENOUS PRN
OUTPATIENT
Start: 2022-11-08

## 2022-11-03 RX ORDER — ACETAMINOPHEN 325 MG/1
650 TABLET ORAL ONCE
Status: COMPLETED | OUTPATIENT
Start: 2022-11-03 | End: 2022-11-03

## 2022-11-03 RX ORDER — ALBUTEROL SULFATE 90 UG/1
4 AEROSOL, METERED RESPIRATORY (INHALATION) PRN
OUTPATIENT
Start: 2022-11-08

## 2022-11-03 RX ORDER — ONDANSETRON 2 MG/ML
8 INJECTION INTRAMUSCULAR; INTRAVENOUS
OUTPATIENT
Start: 2022-11-08

## 2022-11-03 RX ORDER — ACETAMINOPHEN 325 MG/1
650 TABLET ORAL
OUTPATIENT
Start: 2022-11-08

## 2022-11-03 RX ORDER — SODIUM CHLORIDE 9 MG/ML
INJECTION, SOLUTION INTRAVENOUS ONCE
Status: COMPLETED | OUTPATIENT
Start: 2022-11-03 | End: 2022-11-03

## 2022-11-03 RX ORDER — ACETAMINOPHEN 325 MG/1
650 TABLET ORAL ONCE
Start: 2022-11-08 | End: 2022-11-08

## 2022-11-03 RX ORDER — FAMOTIDINE 10 MG/ML
20 INJECTION, SOLUTION INTRAVENOUS
OUTPATIENT
Start: 2022-11-08

## 2022-11-03 RX ORDER — HEPARIN SODIUM (PORCINE) LOCK FLUSH IV SOLN 100 UNIT/ML 100 UNIT/ML
500 SOLUTION INTRAVENOUS PRN
OUTPATIENT
Start: 2022-11-08

## 2022-11-03 RX ORDER — DIPHENHYDRAMINE HYDROCHLORIDE 50 MG/ML
50 INJECTION INTRAMUSCULAR; INTRAVENOUS
OUTPATIENT
Start: 2022-11-08

## 2022-11-03 RX ORDER — SODIUM CHLORIDE 9 MG/ML
5-250 INJECTION, SOLUTION INTRAVENOUS PRN
OUTPATIENT
Start: 2022-11-08

## 2022-11-03 RX ADMIN — DEXAMETHASONE SODIUM PHOSPHATE 10 MG: 10 INJECTION, SOLUTION INTRAMUSCULAR; INTRAVENOUS at 08:39

## 2022-11-03 RX ADMIN — SODIUM CHLORIDE 25 MG: 9 INJECTION, SOLUTION INTRAVENOUS at 09:01

## 2022-11-03 RX ADMIN — ACETAMINOPHEN 650 MG: 325 TABLET ORAL at 08:36

## 2022-11-03 RX ADMIN — SODIUM CHLORIDE 975 MG: 9 INJECTION, SOLUTION INTRAVENOUS at 10:40

## 2022-11-03 RX ADMIN — SODIUM CHLORIDE: 9 INJECTION, SOLUTION INTRAVENOUS at 08:38

## 2022-11-03 NOTE — PROGRESS NOTES
Pt here for Infed infusion. PIV started blood return  noted. Pt has no complaints. Infed test dose infused pt observed for 1 hour with no complaints. Infed infusion started. Pt  tolerated with no complaints.  Left ambulatory discharge instructions given

## 2022-11-09 NOTE — PROGRESS NOTES
Patient Name: Brennan Taylor  Patient : 1945  Patient MRN: [id number]     Primary Oncologist: Verta Habermann, MD  Referring Provider: Mireille Patel MD     Date of Service: 2022      Chief Complaint:   Chief Complaint   Patient presents with    Follow-up       He came in for follow-up visit. Patient Active Problem List:     Edema     CAD (coronary artery disease)     Diabetes mellitus      Right inguinal hernia     Anemia, unspecified     Iron deficiency anemia secondary to blood loss (chronic)     Anemia of chronic disorder     Dyslipidemia     Essential hypertension     HPI:   Urban Gonzalez is a pleasant 44-year-old male patient whom I have been following for chronic anemia since 2009. He had GI workup, including colonoscopy, EGD, capsule endoscopic study in  by Dr. Yue Saeed. He was found to have diverticulosis. Bone marrow study in 2009 showed trilineage hematopoietic marrow, which appeared normocellular for his age. Iron stains were adequate. FISH for MDS was negative. Flow cytometry showed no monoclonality. He was found to have mild leukopenia. He had surgery on his right toe in 2013 and was hospitalized in 2013 with cellulitis to right lower extremity. He was treated with antibiotic. MRI of the right lower extremity at the time showed findings compatible with cellulitis. Blood test in 2014 showed white cell count of 5.1, hemoglobin 11.3, platelet 681, iron was 96, TIBC 369, transferrin saturation 26, ferritin 105. He had stool guaiac by Dr. Ambar Escoto in May 2014. He had umbilical hernia surgery in 2014 by Dr. Deangelo Willis. Blood test in 2014 showed white cell count 4.8, hemoglobin 11.4, platelet 733. Iron was 95, ferritin 102, transferrin saturation 31. Thyroid functions were within normal limits. He had colonoscopy in March or 2015. Reportedly, he could have slight inflammation to his colon.   He had his stool checked for C. diff, which was negative. Blood test in August 2015 showed white cell count 4.9, hemoglobin 10.9, platelet 117. Ferritin was 92. Blood test on February 10, 2016, revealed white cell count 4.5, hemoglobin 10.7, hematocrit 33.5, platelet 907, ferritin 96, stable. He stopped taking ASA for three months. Stool guaiac times three in February 2016 were negative. He restarted iron pill on August 8, 2016. Blood test on August 8, 2016, revealed white cell count 4.7, hemoglobin 10.2, platelet 464. Ferritin was 73. From my point of view, he can take his iron pill twice a day. Blood test improved on February 3, 2017, with hemoglobin 11.4. White cell was 4.3, platelet 175. Ferritin was 89, B12 218, with normal methylmalonic acid. I recommended he also take B12 supplements orally. Blood test result was reviewed and he was recommended to take B12 supplements 1,000 mcg a day over the counter. He has been taking this since August 2017. Blood tests in November 2017:  intrinsic factor antibody was negative. Methylmalonic acid, B12 were within normal limits. TSH was within normal limits. CBC   He is a . Blood tests in May 2019 reviewed. Hg dropped slightly. ECHO and stress test in May 25562 were OK. Labs in November 2019 were stable and reviewed. I will defer bone marrow study. He takes B-complex, B-12 pill and Iron pill. He takes 81 mg ASA. Blood test in May 2020 was stable to him. CBC on November 13, 2020 showed WBC 5.7, hemoglobin 12.2, platelet 184. He had the Covid vaccine. He had colonoscopy in 2020 and reportedly there was no polyp. He also had capsule endoscopic study. Recent labs in May 2021 were reviewed. Ferritin was 93, WBC 6.0, hemoglobin 10.7, MCV 94.2, platelet 653. Review of labs from December 3, 2021 revealed WBC 5.4, hemoglobin 10.7, hematocrit 32.9, MCV 93.2, platelets 968.  CMP grossly unremarkable, ferritin 106, iron 85 TIBC 340, transferrin percent 25 vitamin B12 608, folate 14.7. He takes iron pill daily as well as baby aspirin. In June 2022 Hg 10.7, MCV 96.3. Ferritin 85, Folate >20 and B-12 750.5. He takes daily Iron pill and ASA. On 10/26/2022 he was referred back for worsening anemia. Hg in October 2022 by PCP was 7.4. He is currently on xarelto for Afib since 7/2022. He is also ASA. 10/26/2022 Hg 6.7, . 6. He feels tired easily. He is agreeable to blood  transfusion. 12/9/2022 he came in for follow up visit. He had Infed in 11/2022.  12/2/2022 folate >20, B-12 877.5. Hg 8.2, .7. Ferritin 118. TIBC 352. He has EGD in June 2022 in [place]. He is agreeable to have labs prior to next OV. He follows with Dr Jemima Samaniego and has pacemaker. I recommend to discuss with cardiologist about xarelto and ASA. I recommend to watch for blood in the urine and stool. No acute pain. Denied any nausea, vomiting or diarrhea. No fever or chills. No chest pain, shortness of breath or palpitation. No headache or dizzy spell. No specific bone pain. No melena or hematochezia. Denied any dysuria or hematuria. PAST MEDICAL HISTORY:  Heart disease, chronic arthritis, diabetes. Chronic anemia. He had surgery on his nose in February 2020. FAMILY HISTORY  No blood disorder in the family. No history of malignancy. SOCIAL HISTORY:   He quit smoking in 1993. No history of alcohol abuse. Denied illicit drug use. LABORATORY STUDIES:   Labs in August 2012 showed white cell count 4.4, hemoglobin 11.4, hematocrit 32.1, platelet 180,874. Ferritin 63, iron 69, TIBC 362, transferrin saturation 19 percent. May 2013:  Ferritin 56, iron 68, iron-binding 361, transferrin saturation 19 percent, B12 324. White cell remains stable at 4.2, hemoglobin 11.8, hematocrit 34.5, platelet 285. Review of Systems: \"Per interval history; otherwise 10 point ROS is negative. \"     Vital Signs:  BP (!) 117/55 (Site: Right Upper Arm, Position: Sitting, Cuff Size: Medium Adult)   Pulse 67   Temp 97.3 °F (36.3 °C) (Infrared)   Resp 18   Ht 5' 9\" (1.753 m)   Wt 156 lb (70.8 kg)   SpO2 99%   BMI 23.04 kg/m²     Physical Exam:  CONSTITUTIONAL: awake, alert, cooperative, no apparent distress   EYES: pupils equal & round. Sclera clear and + pallor   ENT: Normocephalic, without obvious abnormality, atraumatic  NECK: supple, symmetrical, no jugular venous distension and no carotid bruits   HEMATOLOGIC/LYMPHATIC: no cervical, supraclavicular or axillary lymphadenopathy   LUNGS: no increased work of breathing and clear to auscultation b/l  CARDIOVASCULAR: regular rate and rhythm, normal S1 and S2, no murmur noted  ABDOMEN: normal bowel sounds, soft, non-distended, non-tender, no masses palpated, no rebound or guarding. MUSCULOSKELETAL: full range of motion noted, tone is normal  NEUROLOGIC: Motor skills grossly intact. CN II - XII grossly intact  SKIN: No jaundice. Dry skin  EXTREMITIES: no LE edema. No cyanosis. Homans negative.     Labs:  Hematology:  Lab Results   Component Value Date    WBC 4.4 12/02/2022    RBC 2.65 (L) 12/02/2022    HGB 8.2 (L) 12/02/2022    HCT 28.0 (L) 12/02/2022    .7 (H) 12/02/2022    MCH 30.9 12/02/2022    MCHC 29.3 (L) 12/02/2022    RDW 15.1 (H) 12/02/2022     12/02/2022    MPV 9.6 12/02/2022    SEGSPCT 63.6 12/02/2022    EOSRELPCT 4.1 (H) 12/02/2022    BASOPCT 0.9 12/02/2022    LYMPHOPCT 22.1 (L) 12/02/2022    MONOPCT 9.3 (H) 12/02/2022    SEGSABS 2.8 12/02/2022    EOSABS 0.2 12/02/2022    BASOSABS 0.0 12/02/2022    LYMPHSABS 1.0 12/02/2022    MONOSABS 0.4 12/02/2022    DIFFTYPE AUTOMATED DIFFERENTIAL 12/02/2022     Lab Results   Component Value Date    ESR 26 (H) 12/20/2013     Chemistry:  Lab Results   Component Value Date     08/18/2022    K 4.6 08/18/2022     08/18/2022    CO2 25 08/18/2022    BUN 20 08/18/2022    CREATININE 1.4 (H) 08/18/2022    GLUCOSE 126 (H) 08/18/2022    CALCIUM 8.6 08/18/2022    PROT 6.0 (L) 11/12/2022    LABALBU 4.1 11/12/2022    BILITOT 0.2 11/12/2022    ALKPHOS 41 11/12/2022    AST 26 11/12/2022    ALT 20 11/12/2022    LABGLOM 49 (L) 08/18/2022    GFRAA 60 (L) 08/18/2022    PHOS 3.7 08/18/2022    MG 1.8 08/18/2022    POCGLU 125 (H) 08/23/2022     Lab Results   Component Value Date    MMA 0.25 11/09/2017     Lab Results   Component Value Date    TSHHS 5.090 (H) 11/12/2022    T4FREE 1.15 11/12/2014     Immunology:  Lab Results   Component Value Date    PROT 6.0 (L) 11/12/2022     Coagulation Panel:  Lab Results   Component Value Date    PROTIME 34.3 (H) 08/18/2022    INR 2.63 08/18/2022    APTT 61.2 (H) 08/18/2022     Anemia Panel:  Lab Results   Component Value Date    LJQZAIDC03 877.5 12/02/2022    FOLATE >20.0 (H) 12/02/2022     Tumor Markers:  Lab Results   Component Value Date    PSA 0.67 12/14/2013      Observations:  PHQ-9 Total Score: 0 (12/9/2022 11:09 AM)     Assessment & Plan:  1. He has anemia. Bone marrow study in 2009 showed normo cellular marrow for his age. He could have a low-grade myelodysplasia and iron deficiency. He had colonoscopy in March or April 2015 by Dr. Vitor Lawrence. B-12, methylmalonic acid were within normal limits. Blood test in May was reviewed. Hg dropped slightly. Labs in November 2019 were reviewed and stable. Blood test in May 2020 was stable to him. CBC in November 2020 was stable. Labs in May 2021 were reviewed and stable. Labs in June 2022 were stable for him. He came in 10/2022 due to worsening anemia. He started on xarelto in July 2022 due to A-fib. 10/26/2022 CBC reviewed and I scheduled for one unit PRBC. He received Infed in 11/2022.  12/2/2022 folate >20, B-12 877.5. Hg 8.2, .7. Ferritin 118. TIBC 352. We will continue to monitor CBC and Iron study. 2. I advised him to keep his age-appropriate cancer screening up to date. He had colonoscopy in 2020 which reportedly was negative for polyp.  He had colonoscopy in February 2022 with polyp removal. 5 years follow up. He started on Creon by Tressa BERNAL. Reportedly he has EGD in June 2022.    3. A-fib. S/p Pace maker. On xarelto and ASA. I recommend to discuss with cardiologist about xarelto and ASA. He follows with Dr Radha Vines. We discussed about healthy diet and lifestyle. Leonela Miller He had Covid vaccine    RTC in 2 months or sooner. All of his questions have been answered for today. Recent imaging and labs were reviewed and discussed with the patient.

## 2022-11-11 ENCOUNTER — OFFICE VISIT (OUTPATIENT)
Dept: CARDIOLOGY CLINIC | Age: 77
End: 2022-11-11
Payer: MEDICARE

## 2022-11-11 VITALS
BODY MASS INDEX: 22.93 KG/M2 | OXYGEN SATURATION: 95 % | HEART RATE: 68 BPM | DIASTOLIC BLOOD PRESSURE: 46 MMHG | WEIGHT: 154.8 LBS | SYSTOLIC BLOOD PRESSURE: 108 MMHG | HEIGHT: 69 IN

## 2022-11-11 DIAGNOSIS — Z79.899 ON AMIODARONE THERAPY: Primary | ICD-10-CM

## 2022-11-11 PROCEDURE — 1036F TOBACCO NON-USER: CPT | Performed by: INTERNAL MEDICINE

## 2022-11-11 PROCEDURE — G8427 DOCREV CUR MEDS BY ELIG CLIN: HCPCS | Performed by: INTERNAL MEDICINE

## 2022-11-11 PROCEDURE — 3017F COLORECTAL CA SCREEN DOC REV: CPT | Performed by: INTERNAL MEDICINE

## 2022-11-11 PROCEDURE — G8484 FLU IMMUNIZE NO ADMIN: HCPCS | Performed by: INTERNAL MEDICINE

## 2022-11-11 PROCEDURE — 3074F SYST BP LT 130 MM HG: CPT | Performed by: INTERNAL MEDICINE

## 2022-11-11 PROCEDURE — 99214 OFFICE O/P EST MOD 30 MIN: CPT | Performed by: INTERNAL MEDICINE

## 2022-11-11 PROCEDURE — 3078F DIAST BP <80 MM HG: CPT | Performed by: INTERNAL MEDICINE

## 2022-11-11 PROCEDURE — G8420 CALC BMI NORM PARAMETERS: HCPCS | Performed by: INTERNAL MEDICINE

## 2022-11-11 PROCEDURE — 1124F ACP DISCUSS-NO DSCNMKR DOCD: CPT | Performed by: INTERNAL MEDICINE

## 2022-11-11 NOTE — PATIENT INSTRUCTIONS
Please be informed that if you contact our office outside of normal business hours the physician on call cannot help with any scheduling or rescheduling issues, procedure instruction questions or any type of medication issue. We advise you for any urgent/emergency that you go to the nearest emergency room! PLEASE CALL OUR OFFICE DURING NORMAL BUSINESS HOURS    Monday - Friday   8 am to 5 pm    [place]: [address]: [phone]    [place]:  [phone]    **It is YOUR responsibilty to bring medication bottles and/or updated medication list to [address] [place] [address].  This will allow us to better serve you and all your healthcare needs**

## 2022-11-11 NOTE — PROGRESS NOTES
Evaristo Houston MD        OFFICE  FOLLOWUP NOTE    Chief complaints: patient is here for management of PAF, PPM ( SICK SINUS SYNDROME), CAD, DM, HTN, H/O SYNCOPE, RETINAL HEMORRHAGE    Subjective: patient feels better, no chest pain, no shortness of breath, no dizziness, no palpitations    HPI Tahira Mae is a 78 y. o.year old who  has a past medical history of Abdominal Doppler, Acute MI ([facility] [address] Arthritis, CAD (coronary artery disease), Diabetes mellitus ([facility] [address] Essential hypertension, H/O cardiovascular stress test, H/O echocardiogram, and H/O echocardiogram. and presents for management of PAF, PPM, CAD, DM, HTN, H/O SYNCOPE which are well controlled      Current Outpatient Medications   Medication Sig Dispense Refill    amiodarone (CORDARONE) 200 MG tablet Take 1 tablet by mouth in the morning. 90 tablet 1    metoprolol tartrate (LOPRESSOR) 25 MG tablet Take 1 tablet by mouth in the morning and 1 tablet before bedtime. 180 tablet 1    rivaroxaban (XARELTO) 20 MG TABS tablet Take 1 tablet by mouth in the morning. 90 tablet 1    loperamide (IMODIUM A-D) 2 MG tablet Take 1 tablet by mouth 2 times daily      Lancets (ONETOUCH DELICA PLUS [id number]) MISC       ONETOUCH VERIO strip       Coenzyme Q10 (COQ-10) 30 MG CAPS Take 30 mg by mouth daily      fenofibrate (TRICOR) 145 MG tablet Take 145 mg by mouth daily      metFORMIN (GLUCOPHAGE-XR) 500 MG extended release tablet Take 500 mg by mouth 2 times daily (with meals)       b complex vitamins capsule Take 1 capsule by mouth daily      ferrous sulfate 325 (65 Fe) MG tablet Take 325 mg by mouth daily (with breakfast)      Cyanocobalamin (VITAMIN B 12 PO) Take by mouth every other day       atorvastatin (LIPITOR) 10 MG tablet Take 10 mg by mouth daily. aspirin 81 MG tablet Take 81 mg by mouth daily  (Patient not taking: No sig reported)       No current facility-administered medications for this visit. Allergies: Patient has no known allergies.   Past Medical History:   Diagnosis Date    Abdominal Doppler 2021    Normal study    Acute MI [facility])      - angioplasty, no stent, Dr. Isaac Seals     CAD (coronary artery disease)     Diabetes mellitus ([facility] [address]     Essential hypertension 2020    H/O cardiovascular stress test 2019    Normal study. H/O echocardiogram 2019    EF 55-60%, Normal study. H/O echocardiogram 2020    EF 55-60%, Mild MR & LVH. Past Surgical History:   Procedure Laterality Date    BALLOON ANGIOPLASTY, ARTERY  1994    CARDIAC CATHETERIZATION      CATARACT REMOVAL      PACEMAKER PLACEMENT      PACEMAKER PLACEMENT Left 2022    [employer]: Dalia XT DR JESSICA Giron Dual PPM    TOE SURGERY       Family History   Problem Relation Age of Onset    Heart Attack Father     Heart Disease Father      Social History     Tobacco Use    Smoking status: Former     Packs/day: 1.50     Years: 24.00     Pack years: 36.00     Types: Cigarettes     Quit date:      Years since quittin.8    Smokeless tobacco: Never   Substance Use Topics    Alcohol use: No     Comment: Caffeine: 6-8 cups coffee daily.       @[facility]@  Review of Systems:   Constitutional: No Fever or Weight Loss   Eyes: No Decreased Vision  ENT: No Headaches, Hearing Loss or Vertigo  Cardiovascular: No chest pain, dyspnea on exertion, palpitations or loss of consciousness  Respiratory: No cough or wheezing    Gastrointestinal: No abdominal pain, appetite loss, blood in stools, constipation, diarrhea or heartburn  Genitourinary: No dysuria, trouble voiding, or hematuria  Musculoskeletal:  No gait disturbance, weakness or joint complaints  Integumentary: No rash or pruritis  Neurological: No TIA or stroke symptoms  Psychiatric: No anxiety or depression  Endocrine: No malaise, fatigue or temperature intolerance  Hematologic/Lymphatic: No bleeding problems, blood clots or swollen lymph nodes  Allergic/Immunologic: No nasal congestion or hives  All systems negative except as marked. Objective:  BP (!) 108/46 (Site: Left Upper Arm, Position: Sitting, Cuff Size: Medium Adult)   Pulse 68   Ht 5' 9\" (1.753 m)   Wt 154 lb 12.8 oz (70.2 kg)   SpO2 95%   BMI 22.86 kg/m²   Wt Readings from Last 3 Encounters:   11/11/22 154 lb 12.8 oz (70.2 kg)   11/03/22 156 lb 9.6 oz (71 kg)   10/26/22 156 lb 9.6 oz (71 kg)     Body mass index is 22.86 kg/m². GENERAL - Alert, oriented, pleasant, in no apparent distress,normal grooming  HEENT - pupils are intact, cornea intact, conjunctive normal color, ears are normal in exam,  Neck - Supple. No jugular venous distention noted. No carotid bruits, no apical -carotid delay  Respiratory:  Normal breath sounds, No respiratory distress, No wheezing, No chest tenderness. ,no use of accessory muscles, diaphragm movement is normal  Cardiovascular: (PMI) apex non displaced,no lifts no thrills, no s3,no s4, Normal heart rate, Normal rhythm, No murmurs, No rubs, No gallops. Carotid arteries pulse and amplitude are normal no bruit, no abdominal bruit noted ( normal abdominal aorta ausculation),   Extremities - No cyanosis, clubbing, or significant edema, no varicose veins    Abdomen - No masses, tenderness, or organomegaly, no hepato-splenomegally, no bruits  Musculoskeletal - No significant edema, no kyphosis or scoliosis, no deformity in any extremity noted, muscle strength and tone are normal  Skin: no ulcer,no scar,no stasis dermatitis   Neurologic - alert oriented times 3,Cranial nerves II through XII are grossly intact. There were no gross focal neurologic abnormalities.    Psychiatric: normal mood and affect    Lab Results   Component Value Date/Time    CKTOTAL 79 06/26/2022 12:25 PM     BNP:  No results found for: BNP  PT/INR:  No results found for: PTINR  Lab Results   Component Value Date    LABA1C 6.9 (H) 06/28/2022    LABA1C 6.6 (H) 03/17/2014     Lab Results   Component Value Date    CHOL 110 03/17/2014    TRIG 73 03/17/2014 HDL 35 (L) 03/17/2014    LDLDIRECT 61 03/17/2014     Lab Results   Component Value Date    ALT 11 06/26/2022    AST 18 06/26/2022     TSH:  No results found for: TSH    Impression:  Elis Sanches is a 78 y. o.year old who  has a past medical history of Abdominal Doppler, Acute MI ([facility] [address] Arthritis, CAD (coronary artery disease), Diabetes mellitus ([facility] [address] Essential hypertension, H/O cardiovascular stress test, H/O echocardiogram, and H/O echocardiogram. and presents with     Plan:  RT RETINAL HEMORRHAGE\": OK TO STOP XARELTO AND OR ASPIRIN IF NEED BE, RECOMMEND TO FOLLOW UP WITH DR. DHALIWAL and get back to us for final decision regarding aspirin and xarelto. Presyncope WITH SICK SINUS WITH AFIB, S/P PPM NOW. FEELS BETTER, CONTINUE AMIODARONE, XARELTO, WILL CHECK LFTS, PFT , TSH  PAF: h/o cardioversion, continue amiodarone and lopress\or and xarelto, PACER INTERROGATED. HE REMAINS APVP, may get off amiodarone in neat future. CAD he has history of PTCA in 1994 and an L3 recommend he continue aspirin and statins and metoprolol  Diabetes controlled continue metformin lipidemia continue statins  Hypertension,CONTINUE lopressor,   All labs, medications and tests reviewed, continue all other medications of all above medical condition listed as is.     @[facility]@

## 2022-11-12 ENCOUNTER — HOSPITAL ENCOUNTER (OUTPATIENT)
Age: 77
Discharge: HOME | End: 2022-11-12
Payer: MEDICARE

## 2022-11-12 DIAGNOSIS — Z79.899 ON AMIODARONE THERAPY: ICD-10-CM

## 2022-11-12 LAB
ALBUMIN SERPL-MCNC: 4.1 GM/DL (ref 3.4–5)
ALP BLD-CCNC: 41 IU/L (ref 40–129)
ALT SERPL-CCNC: 20 U/L (ref 10–40)
AST SERPL-CCNC: 26 IU/L (ref 15–37)
BILIRUB SERPL-MCNC: 0.2 MG/DL (ref 0–1)
BILIRUBIN DIRECT: 0.2 MG/DL (ref 0–0.3)
BILIRUBIN, INDIRECT: 0 MG/DL (ref 0–0.7)
TOTAL PROTEIN: 6 GM/DL (ref 6.4–8.2)
TSH HIGH SENSITIVITY: 5.09 UIU/ML (ref 0.27–4.2)

## 2022-11-12 PROCEDURE — 84443 ASSAY THYROID STIM HORMONE: CPT

## 2022-11-12 PROCEDURE — 80076 HEPATIC FUNCTION PANEL: CPT

## 2022-11-12 PROCEDURE — 84480 ASSAY TRIIODOTHYRONINE (T3): CPT

## 2022-11-12 PROCEDURE — 36415 COLL VENOUS BLD VENIPUNCTURE: CPT

## 2022-11-15 DIAGNOSIS — Z79.899 ON AMIODARONE THERAPY: Primary | ICD-10-CM

## 2022-11-15 LAB — T3 TOTAL: 85 NG/DL (ref 80–200)

## 2022-12-02 ENCOUNTER — HOSPITAL ENCOUNTER (OUTPATIENT)
Dept: INFUSION THERAPY | Age: 77
Discharge: HOME | End: 2022-12-02
Payer: MEDICARE

## 2022-12-02 DIAGNOSIS — D64.9 ANEMIA, UNSPECIFIED TYPE: ICD-10-CM

## 2022-12-02 LAB
BASOPHILS ABSOLUTE: 0 K/CU MM
BASOPHILS RELATIVE PERCENT: 0.9 % (ref 0–1)
DIFFERENTIAL TYPE: ABNORMAL
EOSINOPHILS ABSOLUTE: 0.2 K/CU MM
EOSINOPHILS RELATIVE PERCENT: 4.1 % (ref 0–3)
FERRITIN: 118 NG/ML (ref 30–400)
FOLATE: >20 NG/ML (ref 3.1–17.5)
HCT VFR BLD CALC: 28 % (ref 42–52)
HEMOGLOBIN: 8.2 GM/DL (ref 13.5–18)
IRON: 67 UG/DL (ref 59–158)
LYMPHOCYTES ABSOLUTE: 1 K/CU MM
LYMPHOCYTES RELATIVE PERCENT: 22.1 % (ref 24–44)
MCH RBC QN AUTO: 30.9 PG (ref 27–31)
MCHC RBC AUTO-ENTMCNC: 29.3 % (ref 32–36)
MCV RBC AUTO: 105.7 FL (ref 78–100)
MONOCYTES ABSOLUTE: 0.4 K/CU MM
MONOCYTES RELATIVE PERCENT: 9.3 % (ref 0–4)
PCT TRANSFERRIN: 19 % (ref 10–44)
PDW BLD-RTO: 15.1 % (ref 11.7–14.9)
PLATELET # BLD: 287 K/CU MM (ref 140–440)
PMV BLD AUTO: 9.6 FL (ref 7.5–11.1)
RBC # BLD: 2.65 M/CU MM (ref 4.6–6.2)
SEGMENTED NEUTROPHILS ABSOLUTE COUNT: 2.8 K/CU MM
SEGMENTED NEUTROPHILS RELATIVE PERCENT: 63.6 % (ref 36–66)
TOTAL IRON BINDING CAPACITY: 352 UG/DL (ref 250–450)
UNSATURATED IRON BINDING CAPACITY: 285 UG/DL (ref 110–370)
VITAMIN B-12: 877.5 PG/ML (ref 211–911)
WBC # BLD: 4.4 K/CU MM (ref 4–10.5)

## 2022-12-02 PROCEDURE — 83550 IRON BINDING TEST: CPT

## 2022-12-02 PROCEDURE — 83540 ASSAY OF IRON: CPT

## 2022-12-02 PROCEDURE — 36415 COLL VENOUS BLD VENIPUNCTURE: CPT

## 2022-12-02 PROCEDURE — 82746 ASSAY OF FOLIC ACID SERUM: CPT

## 2022-12-02 PROCEDURE — 85025 COMPLETE CBC W/AUTO DIFF WBC: CPT

## 2022-12-02 PROCEDURE — 82728 ASSAY OF FERRITIN: CPT

## 2022-12-02 PROCEDURE — 82607 VITAMIN B-12: CPT

## 2022-12-09 ENCOUNTER — OFFICE VISIT (OUTPATIENT)
Dept: ONCOLOGY | Age: 77
End: 2022-12-09

## 2022-12-09 ENCOUNTER — HOSPITAL ENCOUNTER (OUTPATIENT)
Dept: INFUSION THERAPY | Age: 77
Discharge: HOME | End: 2022-12-09
Payer: MEDICARE

## 2022-12-09 VITALS
RESPIRATION RATE: 18 BRPM | OXYGEN SATURATION: 99 % | TEMPERATURE: 97.3 F | DIASTOLIC BLOOD PRESSURE: 55 MMHG | WEIGHT: 156 LBS | SYSTOLIC BLOOD PRESSURE: 117 MMHG | HEART RATE: 67 BPM | BODY MASS INDEX: 23.11 KG/M2 | HEIGHT: 69 IN

## 2022-12-09 DIAGNOSIS — D50.0 IRON DEFICIENCY ANEMIA SECONDARY TO BLOOD LOSS (CHRONIC): Primary | ICD-10-CM

## 2022-12-09 PROCEDURE — 99211 OFF/OP EST MAY X REQ PHY/QHP: CPT

## 2022-12-09 ASSESSMENT — PATIENT HEALTH QUESTIONNAIRE - PHQ9
SUM OF ALL RESPONSES TO PHQ9 QUESTIONS 1 & 2: 0
2. FEELING DOWN, DEPRESSED OR HOPELESS: 0
SUM OF ALL RESPONSES TO PHQ QUESTIONS 1-9: 0
1. LITTLE INTEREST OR PLEASURE IN DOING THINGS: 0

## 2022-12-14 ENCOUNTER — HOSPITAL ENCOUNTER (OUTPATIENT)
Dept: PULMONOLOGY | Age: 77
Discharge: HOME | End: 2022-12-14
Payer: MEDICARE

## 2022-12-14 DIAGNOSIS — Z79.899 ON AMIODARONE THERAPY: ICD-10-CM

## 2022-12-14 LAB
DLCO %PRED: 87 %
DLCO PRED: NORMAL
DLCO/VA %PRED: NORMAL
DLCO/VA PRED: NORMAL
DLCO/VA: NORMAL
DLCO: NORMAL
EXPIRATORY TIME-POST: NORMAL
EXPIRATORY TIME: NORMAL
FEF 25-75% %CHNG: NORMAL
FEF 25-75% %PRED-POST: NORMAL
FEF 25-75% %PRED-PRE: NORMAL
FEF 25-75% PRED: NORMAL
FEF 25-75%-POST: NORMAL
FEF 25-75%-PRE: NORMAL
FEV1 %PRED-POST: NORMAL %
FEV1 %PRED-PRE: 94 %
FEV1 PRED: NORMAL
FEV1-POST: NORMAL
FEV1-PRE: NORMAL
FEV1/FVC %PRED-POST: NORMAL
FEV1/FVC %PRED-PRE: 95 %
FEV1/FVC PRED: NORMAL
FEV1/FVC-POST: NORMAL
FEV1/FVC-PRE: NORMAL
FVC %PRED-POST: NORMAL
FVC %PRED-PRE: NORMAL
FVC PRED: NORMAL
FVC-POST: NORMAL
FVC-PRE: NORMAL
GAW %PRED: NORMAL
GAW PRED: NORMAL
GAW: NORMAL
IC %PRED: NORMAL
IC PRED: NORMAL
IC: NORMAL
MEP: NORMAL
MIP: NORMAL
MVV %PRED-PRE: NORMAL
MVV PRED: NORMAL
MVV-PRE: NORMAL
PEF %PRED-POST: NORMAL
PEF %PRED-PRE: NORMAL
PEF PRED: NORMAL
PEF%CHNG: NORMAL
PEF-POST: NORMAL
PEF-PRE: NORMAL
RAW %PRED: NORMAL
RAW PRED: NORMAL
RAW: NORMAL
RV %PRED: NORMAL
RV PRED: NORMAL
RV: NORMAL
SVC %PRED: NORMAL
SVC PRED: NORMAL
SVC: NORMAL
TLC %PRED: NORMAL %
TLC PRED: NORMAL
TLC: NORMAL
VA %PRED: NORMAL
VA PRED: NORMAL
VA: NORMAL
VTG %PRED: NORMAL
VTG PRED: NORMAL
VTG: NORMAL

## 2022-12-14 PROCEDURE — 94729 DIFFUSING CAPACITY: CPT

## 2022-12-14 PROCEDURE — 94010 BREATHING CAPACITY TEST: CPT

## 2022-12-14 ASSESSMENT — PULMONARY FUNCTION TESTS
FEV1/FVC_PERCENT_PREDICTED_PRE: 95
FEV1_PERCENT_PREDICTED_PRE: 94

## 2022-12-19 ENCOUNTER — OFFICE VISIT (OUTPATIENT)
Dept: CARDIOLOGY CLINIC | Age: 77
End: 2022-12-19
Payer: MEDICARE

## 2022-12-19 VITALS
HEART RATE: 70 BPM | HEIGHT: 69 IN | SYSTOLIC BLOOD PRESSURE: 122 MMHG | BODY MASS INDEX: 23.34 KG/M2 | WEIGHT: 157.6 LBS | DIASTOLIC BLOOD PRESSURE: 72 MMHG

## 2022-12-19 DIAGNOSIS — Z79.899 ON AMIODARONE THERAPY: Primary | ICD-10-CM

## 2022-12-19 PROCEDURE — G8484 FLU IMMUNIZE NO ADMIN: HCPCS | Performed by: INTERNAL MEDICINE

## 2022-12-19 PROCEDURE — 3074F SYST BP LT 130 MM HG: CPT | Performed by: INTERNAL MEDICINE

## 2022-12-19 PROCEDURE — 1036F TOBACCO NON-USER: CPT | Performed by: INTERNAL MEDICINE

## 2022-12-19 PROCEDURE — 3078F DIAST BP <80 MM HG: CPT | Performed by: INTERNAL MEDICINE

## 2022-12-19 PROCEDURE — G8420 CALC BMI NORM PARAMETERS: HCPCS | Performed by: INTERNAL MEDICINE

## 2022-12-19 PROCEDURE — 1124F ACP DISCUSS-NO DSCNMKR DOCD: CPT | Performed by: INTERNAL MEDICINE

## 2022-12-19 PROCEDURE — 93000 ELECTROCARDIOGRAM COMPLETE: CPT | Performed by: INTERNAL MEDICINE

## 2022-12-19 PROCEDURE — 99214 OFFICE O/P EST MOD 30 MIN: CPT | Performed by: INTERNAL MEDICINE

## 2022-12-19 PROCEDURE — 3017F COLORECTAL CA SCREEN DOC REV: CPT | Performed by: INTERNAL MEDICINE

## 2022-12-19 PROCEDURE — G8427 DOCREV CUR MEDS BY ELIG CLIN: HCPCS | Performed by: INTERNAL MEDICINE

## 2022-12-19 NOTE — PROGRESS NOTES
Cathi Lazo MD        OFFICE  FOLLOWUP NOTE    Chief complaints: patient is here for management of  PAF, PPM ( SICK SINUS SYNDROME), CAD, DM, HTN, H/O SYNCOPE, RETINAL HEMORRHAGE    Subjective: patient had black stook, stopped xarelto in Nov 18rth 2022, no chest pain, no shortness of breath, no dizziness, no palpitations    HPI Hua Voss is a 78 y. o.year old who  has a past medical history of Abdominal Doppler, Acute MI ([facility] [address] Arthritis, CAD (coronary artery disease), Diabetes mellitus ([facility] [address] Essential hypertension, H/O cardiovascular stress test, H/O echocardiogram, and H/O echocardiogram. and presents for management of PAF, PPM ( SICK SINUS SYNDROME), CAD, DM, HTN, H/O SYNCOPE, RETINAL HEMORRHAGE which are well controlled      Current Outpatient Medications   Medication Sig Dispense Refill    amiodarone (CORDARONE) 200 MG tablet Take 1 tablet by mouth in the morning. 90 tablet 1    loperamide (IMODIUM A-D) 2 MG tablet Take 1 tablet by mouth 2 times daily      Lancets (ONETOUCH DELICA PLUS [id number]) MISC       ONETOUCH VERIO strip       Coenzyme Q10 (COQ-10) 30 MG CAPS Take 30 mg by mouth daily      fenofibrate (TRICOR) 145 MG tablet Take 145 mg by mouth daily      metFORMIN (GLUCOPHAGE-XR) 500 MG extended release tablet Take 500 mg by mouth 2 times daily (with meals)       b complex vitamins capsule Take 1 capsule by mouth daily      ferrous sulfate 325 (65 Fe) MG tablet Take 325 mg by mouth daily (with breakfast)      Cyanocobalamin (VITAMIN B 12 PO) Take by mouth every other day       aspirin 81 MG tablet Take 81 mg by mouth daily      atorvastatin (LIPITOR) 10 MG tablet Take 10 mg by mouth daily. metoprolol tartrate (LOPRESSOR) 25 MG tablet Take 1 tablet by mouth in the morning and 1 tablet before bedtime. 180 tablet 1     No current facility-administered medications for this visit. Allergies: Patient has no known allergies.   Past Medical History:   Diagnosis Date    Abdominal Doppler 2021    Normal study    Acute MI [facility])      - angioplasty, no stent, Dr. Constantino Peña     CAD (coronary artery disease)     Diabetes mellitus ([facility] [address]     Essential hypertension 2020    H/O cardiovascular stress test 2019    Normal study. H/O echocardiogram 2019    EF 55-60%, Normal study. H/O echocardiogram 2020    EF 55-60%, Mild MR & LVH. Past Surgical History:   Procedure Laterality Date    BALLOON ANGIOPLASTY, ARTERY  1994    CARDIAC CATHETERIZATION      CATARACT REMOVAL      PACEMAKER PLACEMENT      PACEMAKER PLACEMENT Left 2022    [employer]: [place] XT DR LOPEZ Surescan Dual PPM    TOE SURGERY       Family History   Problem Relation Age of Onset    Heart Attack Father     Heart Disease Father      Social History     Tobacco Use    Smoking status: Former     Packs/day: 1.50     Years: 24.00     Pack years: 36.00     Types: Cigarettes     Quit date:      Years since quittin.9    Smokeless tobacco: Never   Substance Use Topics    Alcohol use: No     Comment: Caffeine: 6-8 cups coffee daily. [facility]  Review of Systems:   Constitutional: No Fever or Weight Loss   Eyes: No Decreased Vision  ENT: No Headaches, Hearing Loss or Vertigo  Cardiovascular: No chest pain, dyspnea on exertion, palpitations or loss of consciousness  Respiratory: No cough or wheezing    Gastrointestinal: No abdominal pain, appetite loss, blood in stools, constipation, diarrhea or heartburn  Genitourinary: No dysuria, trouble voiding, or hematuria  Musculoskeletal:  No gait disturbance, weakness or joint complaints  Integumentary: No rash or pruritis  Neurological: No TIA or stroke symptoms  Psychiatric: No anxiety or depression  Endocrine: No malaise, fatigue or temperature intolerance  Hematologic/Lymphatic: No bleeding problems, blood clots or swollen lymph nodes  Allergic/Immunologic: No nasal congestion or hives  All systems negative except as marked.    Objective:  BP 122/72 (Site: Right Upper Arm, Position: Sitting, Cuff Size: Medium Adult)   Pulse 70   Ht 5' 8.5\" (1.74 m)   Wt 157 lb 9.6 oz (71.5 kg)   BMI 23.61 kg/m²   Wt Readings from Last 3 Encounters:   12/19/22 157 lb 9.6 oz (71.5 kg)   12/09/22 156 lb (70.8 kg)   11/11/22 154 lb 12.8 oz (70.2 kg)     Body mass index is 23.61 kg/m². GENERAL - Alert, oriented, pleasant, in no apparent distress,normal grooming  HEENT - pupils are intact, cornea intact, conjunctive normal color, ears are normal in exam,  Neck - Supple. No jugular venous distention noted. No carotid bruits, no apical -carotid delay  Respiratory:  Normal breath sounds, No respiratory distress, No wheezing, No chest tenderness. ,no use of accessory muscles, diaphragm movement is normal  Cardiovascular: (PMI) apex non displaced,no lifts no thrills, no s3,no s4, Normal heart rate, Normal rhythm, No murmurs, No rubs, No gallops. Carotid arteries pulse and amplitude are normal no bruit, no abdominal bruit noted ( normal abdominal aorta ausculation),   Extremities - No cyanosis, clubbing, or significant edema, no varicose veins    Abdomen - No masses, tenderness, or organomegaly, no hepato-splenomegally, no bruits  Musculoskeletal - No significant edema, no kyphosis or scoliosis, no deformity in any extremity noted, muscle strength and tone are normal  Skin: no ulcer,no scar,no stasis dermatitis   Neurologic - alert oriented times 3,Cranial nerves II through XII are grossly intact. There were no gross focal neurologic abnormalities.    Psychiatric: normal mood and affect    Lab Results   Component Value Date/Time    CKTOTAL 79 06/26/2022 12:25 PM     BNP:  No results found for: BNP  PT/INR:  No results found for: PTINR  Lab Results   Component Value Date    LABA1C 6.9 (H) 06/28/2022    LABA1C 6.6 (H) 03/17/2014     Lab Results   Component Value Date    CHOL 110 03/17/2014    TRIG 73 03/17/2014    HDL 35 (L) 03/17/2014    LDLDIRECT 61 03/17/2014     Lab Results Component Value Date    ALT 20 11/12/2022    AST 26 11/12/2022     TSH:  No results found for: TSH      Ekg: paced beats, MVP mode    Impression:  Priscilla Zheng is a 78 y. o.year old who  has a past medical history of Abdominal Doppler, Acute MI ([facility] [address] Arthritis, CAD (coronary artery disease), Diabetes mellitus ([facility] [address] Essential hypertension, H/O cardiovascular stress test, H/O echocardiogram, and H/O echocardiogram. and presents with     Plan:  RT RETINAL Keyshawn Hou": Off XARELTO AND continue ASPIRIN , RECOMMEND TO FOLLOW UP WITH DR. DHALIWAL and get back to us for final decision regarding aspirin and xarelto. Presyncope WITH SICK SINUS WITH AFIB, S/P PPM NOW. EKG reviewed with Dr. Lizzy Greer. FEELS BETTER, CONTINUE AMIODARONE, off XARELT due to black stool, WILL CHECK LFTS, PFT , TSH  Black stool: recommend to follow up with GI, he will have EGD, will decide for Bal Bender after EGD  PAF: h/o cardioversion, continue amiodarone and lopress\or off xarelto continue aspirin, PACER INTERROGATED. HE REMAINS APVP, may get off amiodarone in neat future. CAD he has history of PTCA in 1994 and an L3 recommend he continue aspirin and statins and metoprolol  Diabetes controlled continue metformin lipidemia continue statins  Hypertension,CONTINUE lopressor,   All labs, medications and tests reviewed, continue all other medications of all above medical condition listed as is.     [unfilled]

## 2022-12-19 NOTE — PATIENT INSTRUCTIONS
Thank you for allowing us to care for you today! We want to ensure we can follow your treatment plan and we strive to give you the best outcomes and experience possible. If you ever have a life threatening emergency and call 911 - for an ambulance (EMS)   Our providers can only care for you at:   [facility] or [facility]. Even if you have someone take you or you drive yourself we can only care for you in a [address] [facility] facility. Our providers are not setup at the other healthcare locations! **It is YOUR responsibilty to bring medication bottles and/or updated medication list to [address] [place] [address]. This will allow us to better serve you and all your healthcare needs**  Please be informed that if you contact our office outside of normal business hours the physician on call cannot help with any scheduling or rescheduling issues, procedure instruction questions or any type of medication issue. We advise you for any urgent/emergency that you go to the nearest emergency room!     PLEASE CALL OUR OFFICE DURING NORMAL BUSINESS HOURS    Monday - Friday   8 am to 5 pm    [place] [facility] [address]: [phone]    [place]:  [phone]

## 2023-01-12 ENCOUNTER — PROCEDURE VISIT (OUTPATIENT)
Dept: CARDIOLOGY CLINIC | Age: 78
End: 2023-01-12

## 2023-01-12 DIAGNOSIS — I49.5 SINUS NODE DYSFUNCTION (HCC): ICD-10-CM

## 2023-01-12 DIAGNOSIS — I44.0 AV BLOCK, 1ST DEGREE: ICD-10-CM

## 2023-01-12 DIAGNOSIS — Z95.0 CARDIAC PACEMAKER IN SITU: ICD-10-CM

## 2023-01-13 NOTE — PROGRESS NOTES
Patient Name: Junito Rosenthal  Patient : 1945  Patient MRN: [id number]     Primary Oncologist: Ronan Montiel MD  Referring Provider: Ronan King MD     Date of Service: 2023      Chief Complaint:   No chief complaint on file. He came in for follow-up visit. Patient Active Problem List:     Edema     CAD (coronary artery disease)     Diabetes mellitus      Right inguinal hernia     Anemia, unspecified     Iron deficiency anemia secondary to blood loss (chronic)     Anemia of chronic disorder     Dyslipidemia     Essential hypertension     HPI:   Sol Craig is a pleasant 79-year-old male patient whom I have been following for chronic anemia since 2009. He had GI workup, including colonoscopy, EGD, capsule endoscopic study in  by Dr. Jamie Bentley. He was found to have diverticulosis. Bone marrow study in 2009 showed trilineage hematopoietic marrow, which appeared normocellular for his age. Iron stains were adequate. FISH for MDS was negative. Flow cytometry showed no monoclonality. He was found to have mild leukopenia. He had surgery on his right toe in 2013 and was hospitalized in 2013 with cellulitis to right lower extremity. He was treated with antibiotic. MRI of the right lower extremity at the time showed findings compatible with cellulitis. Blood test in 2014 showed white cell count of 5.1, hemoglobin 11.3, platelet 125, iron was 96, TIBC 369, transferrin saturation 26, ferritin 105. He had stool guaiac by Dr. Falguni Orta in May 2014. He had umbilical hernia surgery in 2014 by Dr. Johan Maddox. Blood test in 2014 showed white cell count 4.8, hemoglobin 11.4, platelet 128. Iron was 95, ferritin 102, transferrin saturation 31. Thyroid functions were within normal limits. He had colonoscopy in March or 2015. Reportedly, he could have slight inflammation to his colon.   He had his stool checked for C. diff, which was negative. Blood test in August 2015 showed white cell count 4.9, hemoglobin 10.9, platelet 946. Ferritin was 92. Blood test on February 10, 2016, revealed white cell count 4.5, hemoglobin 10.7, hematocrit 33.5, platelet 591, ferritin 96, stable. He stopped taking ASA for three months. Stool guaiac times three in February 2016 were negative. He restarted iron pill on August 8, 2016. Blood test on August 8, 2016, revealed white cell count 4.7, hemoglobin 10.2, platelet 426. Ferritin was 73. From my point of view, he can take his iron pill twice a day. Blood test improved on February 3, 2017, with hemoglobin 11.4. White cell was 4.3, platelet 571. Ferritin was 89, B12 218, with normal methylmalonic acid. I recommended he also take B12 supplements orally. Blood test result was reviewed and he was recommended to take B12 supplements 1,000 mcg a day over the counter. He has been taking this since August 2017. Blood tests in November 2017:  intrinsic factor antibody was negative. Methylmalonic acid, B12 were within normal limits. TSH was within normal limits. CBC   He is a . Blood tests in May 2019 reviewed. Hg dropped slightly. ECHO and stress test in May [address] were OK. Labs in November 2019 were stable and reviewed. I will defer bone marrow study. He takes B-complex, B-12 pill and Iron pill. He takes 81 mg ASA. Blood test in May 2020 was stable to him. CBC on November 13, 2020 showed WBC 5.7, hemoglobin 12.2, platelet 117. He had the Covid vaccine. He had colonoscopy in 2020 and reportedly there was no polyp. He also had capsule endoscopic study. Recent labs in May 2021 were reviewed. Ferritin was 93, WBC 6.0, hemoglobin 10.7, MCV 94.2, platelet 399. Review of labs from December 3, 2021 revealed WBC 5.4, hemoglobin 10.7, hematocrit 32.9, MCV 93.2, platelets 277.  CMP grossly unremarkable, ferritin 106, iron 85 TIBC 340, transferrin percent 25 vitamin B12 608, folate 14.7. He takes iron pill daily as well as baby aspirin. In June 2022 Hg 10.7, MCV 96.3. Ferritin 85, Folate >20 and B-12 750.5. He takes daily Iron pill and ASA. On 10/26/2022 he was referred back for worsening anemia. Hg in October 2022 by PCP was 7.4. He is currently on xarelto for Afib since 7/2022. He is also ASA. 10/26/2022 Hg 6.7, . 6. He feels tired easily. He is agreeable to blood  transfusion. He had Infed in 11/2022.  12/2/2022 folate >20, B-12 877.5. Hg 8.2, .7. Ferritin 118. TIBC 352. He has EGD in June 2022 in [place]. He follows with Dr Merary Lebron and has pacemaker. I recommend to discuss with cardiologist about xarelto and ASA.    2/9/2023 he came in for follow up visit. 2/2023 Hg 10.9, MCV 95.9. Ferritin 76, TIBC 352. Epo 9.  2/2023 capsule endoscopic study. Xarelto was stopped. He is on ASA. He considers Watchman device. No acute pain. Denied any nausea, vomiting or diarrhea. No fever or chills. No chest pain, shortness of breath or palpitation. No headache or dizzy spell. No specific bone pain. No melena or hematochezia. Denied any dysuria or hematuria. PAST MEDICAL HISTORY:  Heart disease, chronic arthritis, diabetes. Chronic anemia. He had surgery on his nose in February 2020. FAMILY HISTORY  No blood disorder in the family. No history of malignancy. SOCIAL HISTORY:   He quit smoking in 1993. No history of alcohol abuse. Denied illicit drug use. LABORATORY STUDIES:   Labs in August 2012 showed white cell count 4.4, hemoglobin 11.4, hematocrit 32.1, platelet 349,289. Ferritin 63, iron 69, TIBC 362, transferrin saturation 19 percent. May 2013:  Ferritin 56, iron 68, iron-binding 361, transferrin saturation 19 percent, B12 324. White cell remains stable at 4.2, hemoglobin 11.8, hematocrit 34.5, platelet 427. Review of Systems: \"Per interval history; otherwise 10 point ROS is negative. \"     Vital Signs:   There were no vitals taken for this visit. Physical Exam:  CONSTITUTIONAL: awake, alert, cooperative, no apparent distress   EYES: pupils equal & round. Sclera clear and + pallor   ENT: Normocephalic, without obvious abnormality, atraumatic  NECK: supple, symmetrical, no jugular venous distension and no carotid bruits   HEMATOLOGIC/LYMPHATIC: no cervical, supraclavicular or axillary lymphadenopathy   LUNGS: no increased work of breathing and clear to auscultation b/l  CARDIOVASCULAR: regular rate and rhythm, normal S1 and S2, no murmur noted  ABDOMEN: normal bowel sounds, soft, non-distended, non-tender, no masses palpated, no rebound or guarding. MUSCULOSKELETAL: full range of motion noted, tone is normal  NEUROLOGIC: Motor skills grossly intact. CN II - XII grossly intact  SKIN: No jaundice. Dry skin  EXTREMITIES: no LE edema or cyanosis. Homans negative.     Labs:  Hematology:  Lab Results   Component Value Date    WBC 4.4 12/02/2022    RBC 2.65 (L) 12/02/2022    HGB 8.2 (L) 12/02/2022    HCT 28.0 (L) 12/02/2022    .7 (H) 12/02/2022    MCH 30.9 12/02/2022    MCHC 29.3 (L) 12/02/2022    RDW 15.1 (H) 12/02/2022     12/02/2022    MPV 9.6 12/02/2022    SEGSPCT 63.6 12/02/2022    EOSRELPCT 4.1 (H) 12/02/2022    BASOPCT 0.9 12/02/2022    LYMPHOPCT 22.1 (L) 12/02/2022    MONOPCT 9.3 (H) 12/02/2022    SEGSABS 2.8 12/02/2022    EOSABS 0.2 12/02/2022    BASOSABS 0.0 12/02/2022    LYMPHSABS 1.0 12/02/2022    MONOSABS 0.4 12/02/2022    DIFFTYPE AUTOMATED DIFFERENTIAL 12/02/2022     Lab Results   Component Value Date    ESR 26 (H) 12/20/2013     Chemistry:  Lab Results   Component Value Date     08/18/2022    K 4.6 08/18/2022     08/18/2022    CO2 25 08/18/2022    BUN 20 08/18/2022    CREATININE 1.4 (H) 08/18/2022    GLUCOSE 126 (H) 08/18/2022    CALCIUM 8.6 08/18/2022    PROT 6.0 (L) 11/12/2022    LABALBU 4.1 11/12/2022    BILITOT 0.2 11/12/2022    ALKPHOS 41 11/12/2022    AST 26 11/12/2022    ALT 20 11/12/2022    LABGLOM 49 (L) 08/18/2022    GFRAA 60 (L) 08/18/2022    PHOS 3.7 08/18/2022    MG 1.8 08/18/2022    POCGLU 125 (H) 08/23/2022     Lab Results   Component Value Date    MMA 0.25 11/09/2017     Lab Results   Component Value Date    TSHHS 5.090 (H) 11/12/2022    T4FREE 1.15 11/12/2014     Immunology:  Lab Results   Component Value Date    PROT 6.0 (L) 11/12/2022     Coagulation Panel:  Lab Results   Component Value Date    PROTIME 34.3 (H) 08/18/2022    INR 2.63 08/18/2022    APTT 61.2 (H) 08/18/2022     Anemia Panel:  Lab Results   Component Value Date    BEKPFNAN02 877.5 12/02/2022    FOLATE >20.0 (H) 12/02/2022     Tumor Markers:  Lab Results   Component Value Date    PSA 0.67 12/14/2013      Observations:  No data recorded     Assessment & Plan:  1. He has anemia. Bone marrow study in 2009 showed normo cellular marrow for his age. He could have a low-grade myelodysplasia and iron deficiency. He had colonoscopy in March or April 2015 by Dr. Liset Mcneal. B-12, methylmalonic acid were within normal limits. Blood test in May was reviewed. Hg dropped slightly. Labs in November 2019 were reviewed and stable. Blood test in May 2020 was stable to him. CBC in November 2020 was stable. Labs in May 2021 were reviewed and stable. Labs in June 2022 were stable for him. He came in 10/2022 due to worsening anemia. He started on xarelto in July 2022 due to A-fib. 10/26/2022 CBC reviewed and I scheduled for one unit PRBC. He received Infed in 11/2022.  12/2/2022 folate >20, B-12 877.5. Hg 8.2, .7. Ferritin 118. TIBC 352.   2/2023 Hg 10.9, MCV 95.9. Ferritin 76, TIBC 352. Epo 9.  2/2023 capsule endoscopic study. Xarelto was stopped. He is on ASA. Hg has improved. He considers Watchman device. We will continue to monitor CBC and Iron study. 2. I advised him to keep his age-appropriate cancer screening up to date. He had colonoscopy in 2020 which reportedly was negative for polyp.  He had colonoscopy in February 2022 with polyp removal. 5 years follow up. He started on Creon by Marielena BERNAL. Reportedly he has EGD in June 2022.    3. A-fib. S/p Pace maker. On xarelto and ASA. Xarelto was stopped. He follows with Dr Conor Sheridan. We discussed about healthy diet and lifestyle. Jen Sun He had Covid vaccine    RTC in 3 months or sooner. All of his questions have been answered for today. Recent imaging and labs were reviewed and discussed with the patient.

## 2023-01-25 ENCOUNTER — TELEPHONE (OUTPATIENT)
Dept: CARDIOLOGY CLINIC | Age: 78
End: 2023-01-25

## 2023-02-01 ENCOUNTER — OFFICE VISIT (OUTPATIENT)
Dept: CARDIOLOGY CLINIC | Age: 78
End: 2023-02-01
Payer: MEDICARE

## 2023-02-01 VITALS
DIASTOLIC BLOOD PRESSURE: 68 MMHG | BODY MASS INDEX: 23.7 KG/M2 | HEART RATE: 73 BPM | HEIGHT: 69 IN | WEIGHT: 160 LBS | SYSTOLIC BLOOD PRESSURE: 138 MMHG

## 2023-02-01 DIAGNOSIS — I48.0 PAF (PAROXYSMAL ATRIAL FIBRILLATION) (HCC): Primary | ICD-10-CM

## 2023-02-01 DIAGNOSIS — H35.61 RETINAL HEMORRHAGE OF RIGHT EYE: ICD-10-CM

## 2023-02-01 DIAGNOSIS — D64.9 CHRONIC ANEMIA: ICD-10-CM

## 2023-02-01 DIAGNOSIS — Z95.0 S/P PLACEMENT OF CARDIAC PACEMAKER: ICD-10-CM

## 2023-02-01 DIAGNOSIS — I48.91 HYPERCOAGULABILITY DUE TO ATRIAL FIBRILLATION (HCC): ICD-10-CM

## 2023-02-01 DIAGNOSIS — I10 ESSENTIAL HYPERTENSION: ICD-10-CM

## 2023-02-01 DIAGNOSIS — D68.69 HYPERCOAGULABILITY DUE TO ATRIAL FIBRILLATION (HCC): ICD-10-CM

## 2023-02-01 PROCEDURE — 1124F ACP DISCUSS-NO DSCNMKR DOCD: CPT | Performed by: NURSE PRACTITIONER

## 2023-02-01 PROCEDURE — 99214 OFFICE O/P EST MOD 30 MIN: CPT | Performed by: NURSE PRACTITIONER

## 2023-02-01 PROCEDURE — G8484 FLU IMMUNIZE NO ADMIN: HCPCS | Performed by: NURSE PRACTITIONER

## 2023-02-01 PROCEDURE — 1036F TOBACCO NON-USER: CPT | Performed by: NURSE PRACTITIONER

## 2023-02-01 PROCEDURE — G8420 CALC BMI NORM PARAMETERS: HCPCS | Performed by: NURSE PRACTITIONER

## 2023-02-01 PROCEDURE — 3075F SYST BP GE 130 - 139MM HG: CPT | Performed by: NURSE PRACTITIONER

## 2023-02-01 PROCEDURE — G8427 DOCREV CUR MEDS BY ELIG CLIN: HCPCS | Performed by: NURSE PRACTITIONER

## 2023-02-01 PROCEDURE — 3078F DIAST BP <80 MM HG: CPT | Performed by: NURSE PRACTITIONER

## 2023-02-01 PROCEDURE — 3017F COLORECTAL CA SCREEN DOC REV: CPT | Performed by: NURSE PRACTITIONER

## 2023-02-01 RX ORDER — AMIODARONE HYDROCHLORIDE 200 MG/1
200 TABLET ORAL DAILY
Qty: 90 TABLET | Refills: 1 | Status: SHIPPED | OUTPATIENT
Start: 2023-02-01 | End: 2023-03-03

## 2023-02-01 RX ORDER — AMIODARONE HYDROCHLORIDE 200 MG/1
200 TABLET ORAL DAILY
Qty: 90 TABLET | Refills: 1 | Status: SHIPPED | OUTPATIENT
Start: 2023-02-01 | End: 2023-02-01

## 2023-02-01 ASSESSMENT — ENCOUNTER SYMPTOMS
BLOOD IN STOOL: 0
SINUS PRESSURE: 0
SHORTNESS OF BREATH: 0
NAUSEA: 0
COLOR CHANGE: 0
EYE PAIN: 0
ABDOMINAL PAIN: 0
CONSTIPATION: 0
EYE ITCHING: 0
WHEEZING: 0
PHOTOPHOBIA: 0
COUGH: 0
SINUS PAIN: 0
DIARRHEA: 0
VOMITING: 0
CHEST TIGHTNESS: 0
BACK PAIN: 0

## 2023-02-01 NOTE — PROGRESS NOTES
Electrophysiology fu Note      Reason for consultation: Atrial fibrillation    Chief complaint: follow up on atrial fibrillation    Referring physician: Dr. David Almendarez      Primary care physician: Jose Medina MD      History of Present Illness: This visit 2/1/2023  Patient here today for follow-up on atrial fibrillation. Patient reports he has been feeling well. He denies chest pain, palpitations, shortness of breath, lightheadedness, dizziness, edema or syncope. Patient recently was stopped of Xarelto by his eye doctor due to a bleed behind his eye. Patient reports that after stopping Xarelto bleed improved. Patient is interested in discussing [address]. He is taking aspirin 81 mg daily    Previous visit (8/5/2022)    Patient is having extreme fatigue. Patient also reports dizziness but did not pass out. Patient had event monitor and he was told it was abnormal and here for further assessment. Patient denies any chest pain, shortness of breath palpitations. He was recently in the hospital with dizziness and near syncope      Previous visit:     Elsie Jean is a 78year old male with a history of CAD, HTN, Diabetes type 2, dyslipidemia, and iron deficiency anemia presents with complaints of dizziness and near syncope. Patient reports that yesterday he was at [employer] when while standing he began to feel dizzy. He states that he made his way to a seat and still felt dizzy. He listening to the [employer] service but then asked for prayer because he wasn't feeling well. The patient ambulated to a pew where he laid down until the EMS arrived. Patient states that he doesn't think he passed out completely. He states that he was dizzy, his vision became dark but here could here everything around him. He states that after he laid down he felt better. Patient states he was told by EMS that he had atrial fibrillation. Patient states he has never been told he has afib.  He denies chest pain, palpitations, shortness of breath, or edema. He states that in May he had a similar episode however he did lose consciousness for a brief period of time. He did not go to the hospital for this episode. 6/27/2022 ECHO EF 55-60%  4/9/2019 stress test reveals normal myocardial perfusion. On admission potassium is 4.1 and creatinine 1.4. Magnesium 1.8. Troponin <0.010 x3. Pastmedical history:   Past Medical History:   Diagnosis Date    Abdominal Doppler 07/06/2021    Normal study    Acute MI [facility])     1993 - angioplasty, no stent, Dr. Meme Clarke     CAD (coronary artery disease)     Diabetes mellitus ([facility] [address]     Essential hypertension 08/18/2020    H/O cardiovascular stress test 04/09/2019    Normal study. H/O echocardiogram 04/18/2019    EF 55-60%, Normal study. H/O echocardiogram 12/21/2020    EF 55-60%, Mild MR & LVH. Surgical history :   Past Surgical History:   Procedure Laterality Date    BALLOON ANGIOPLASTY, ARTERY  02/1994    CARDIAC CATHETERIZATION      CATARACT REMOVAL      PACEMAKER PLACEMENT      PACEMAKER PLACEMENT Left 08/23/2022    [employer]: [place] XT DR JESSICA Giron Dual PPM    TOE SURGERY         Family history:   Family History   Problem Relation Age of Onset    Heart Attack Father     Heart Disease Father        Social history :  reports that he quit smoking about 29 years ago. His smoking use included cigarettes. He has a 36.00 pack-year smoking history. He has never used smokeless tobacco. He reports that he does not drink alcohol and does not use drugs. No Known Allergies    Current Outpatient Medications on File Prior to Visit   Medication Sig Dispense Refill    amiodarone (CORDARONE) 200 MG tablet Take 1 tablet by mouth in the morning. 90 tablet 1    metoprolol tartrate (LOPRESSOR) 25 MG tablet Take 1 tablet by mouth in the morning and 1 tablet before bedtime.  180 tablet 1    loperamide (IMODIUM A-D) 2 MG tablet Take 1 tablet by mouth 2 times daily      Lancets (ONETOUCH DELICA PLUS [id number]) MISC       ONETOUCH VERIO strip       Coenzyme Q10 (COQ-10) 30 MG CAPS Take 30 mg by mouth daily      fenofibrate (TRICOR) 145 MG tablet Take 145 mg by mouth daily      metFORMIN (GLUCOPHAGE-XR) 500 MG extended release tablet Take 500 mg by mouth 2 times daily (with meals)       b complex vitamins capsule Take 1 capsule by mouth daily      ferrous sulfate 325 (65 Fe) MG tablet Take 325 mg by mouth daily (with breakfast)      Cyanocobalamin (VITAMIN B 12 PO) Take by mouth every other day       aspirin 81 MG tablet Take 81 mg by mouth daily      atorvastatin (LIPITOR) 10 MG tablet Take 10 mg by mouth daily. No current facility-administered medications on file prior to visit. Review of Systems:   Review of Systems   Constitutional:  Negative for activity change, chills, fatigue and fever. HENT:  Negative for congestion, sinus pressure and sinus pain. Eyes:  Positive for visual disturbance. Negative for photophobia, pain and itching. Respiratory:  Negative for cough, chest tightness, shortness of breath and wheezing. Cardiovascular:  Negative for chest pain, palpitations and leg swelling. Gastrointestinal:  Negative for abdominal pain, blood in stool, constipation, diarrhea, nausea and vomiting. Endocrine: Negative for cold intolerance and heat intolerance. Genitourinary:  Negative for difficulty urinating, dysuria and flank pain. Musculoskeletal:  Positive for arthralgias. Negative for back pain, myalgias and neck stiffness. Skin:  Negative for color change and rash. Allergic/Immunologic: Negative for food allergies. Neurological:  Negative for dizziness, syncope, light-headedness, numbness and headaches. Hematological:  Does not bruise/bleed easily. Psychiatric/Behavioral:  Negative for agitation, behavioral problems and confusion.           Examination:      Vitals:    02/01/23 1348   BP: 138/68   Site: Left Upper Arm   Position: Sitting   Cuff Size: Medium Adult   Pulse: 73   Weight: 160 lb (72.6 kg)   Height: 5' 8.5\" (1.74 m)        Body mass index is 23.97 kg/m².      Physical Exam  Constitutional:       Appearance: Normal appearance.   HENT:      Head: Normocephalic and atraumatic.      Right Ear: External ear normal.      Left Ear: External ear normal.      Nose: Nose normal. No congestion.      Mouth/Throat:      Mouth: Mucous membranes are moist.      Pharynx: Oropharynx is clear.   Eyes:      General:         Right eye: No discharge.         Left eye: No discharge.      Conjunctiva/sclera: Conjunctivae normal.   Cardiovascular:      Rate and Rhythm: Normal rate and regular rhythm.      Heart sounds: No murmur heard.  Pulmonary:      Effort: Pulmonary effort is normal. No respiratory distress.      Breath sounds: Normal breath sounds. No wheezing or rhonchi.   Abdominal:      General: Abdomen is flat. Bowel sounds are normal.      Palpations: Abdomen is soft.   Musculoskeletal:      Cervical back: Normal range of motion and neck supple.      Right lower leg: No edema.      Left lower leg: No edema.   Skin:     General: Skin is warm and dry.   Neurological:      General: No focal deficit present.      Mental Status: He is alert and oriented to person, place, and time.   Psychiatric:         Mood and Affect: Mood normal.         Thought Content: Thought content normal.             CBC:   Lab Results   Component Value Date/Time    WBC 4.4 12/02/2022 11:02 AM    HGB 8.2 12/02/2022 11:02 AM    HCT 28.0 12/02/2022 11:02 AM     12/02/2022 11:02 AM     Lipids:  Lab Results   Component Value Date    CHOL 110 03/17/2014    TRIG 73 03/17/2014    HDL 35 (L) 03/17/2014    LDLDIRECT 61 03/17/2014     PT/INR:   Lab Results   Component Value Date/Time    INR 2.63 08/18/2022 02:40 PM        BMP:    Lab Results   Component Value Date     08/18/2022    K 4.6 08/18/2022     08/18/2022    CO2 25 08/18/2022    BUN 20  08/18/2022     CMP:   Lab Results   Component Value Date    AST 26 11/12/2022    PROT 6.0 (L) 11/12/2022    BILITOT 0.2 11/12/2022    ALKPHOS 41 11/12/2022     TSH:  No results found for: TSH, T4    EKGINTERPRETATION - EKG Interpretation:     Echo 6/2022     Left ventricular systolic function is normal.   Ejection fraction is visually estimated at 55-60%. Mild left ventricular hypertrophy. Grade II diastolic dysfunction. Sclerotic, but non-stenotic aortic valve. No evidence of any pericardial effusion. IMPRESSION / RECOMMENDATIONS:     Status post pacemaker  Sick sinus syndrome  Symptomatic bradycardia  PAF /PAT  Hypercoagulable due to PAF  Chronic anemia  Retinal hemorrhage  HTN  CAD  DM-2    Recent transmission reviewed patient did not have any further atrial fibrillation  We will continue amiodarone 200 mg daily  Patient had amiodarone labs in November. Patient not on anticoagulation due to bleeding behind the eye. Patient was stopped of Xarelto by his eye doctor  Patient inquiring about watchman device. Will schedule consult with Dr. Mayela Murcia to discuss  Patient also has a history of chronic anemia. Most recent hemoglobin 8.2 in December 2022  Patient follows with Dr. Margy Nino:    02/01/23 1348   BP: 138/68   Pulse: 73     Continue with Toprol tartrate 25 mg twice daily    We will get appointment with Jordi Hood for watchman consult    ANNABELLA Roberts CNP, 2/1/2023 1:56 PM     Please note this report has been partially produced using speech recognition software and may contain errors related to that system including errors in grammar, punctuation, and spelling, as well as words and phrases that may be inappropriate. If there are any questions or concerns please feel free to contact the dictating provider for clarification.

## 2023-02-02 ENCOUNTER — HOSPITAL ENCOUNTER (OUTPATIENT)
Dept: INFUSION THERAPY | Age: 78
Discharge: HOME | End: 2023-02-02
Payer: MEDICARE

## 2023-02-02 DIAGNOSIS — D50.0 IRON DEFICIENCY ANEMIA SECONDARY TO BLOOD LOSS (CHRONIC): ICD-10-CM

## 2023-02-02 LAB
BASOPHILS ABSOLUTE: 0 K/CU MM
BASOPHILS RELATIVE PERCENT: 0.5 % (ref 0–1)
DIFFERENTIAL TYPE: ABNORMAL
EOSINOPHILS ABSOLUTE: 0.2 K/CU MM
EOSINOPHILS RELATIVE PERCENT: 2.4 % (ref 0–3)
FERRITIN: 76 NG/ML (ref 30–400)
HCT VFR BLD CALC: 35.5 % (ref 42–52)
HEMOGLOBIN: 10.9 GM/DL (ref 13.5–18)
IRON: 56 UG/DL (ref 59–158)
LYMPHOCYTES ABSOLUTE: 1.4 K/CU MM
LYMPHOCYTES RELATIVE PERCENT: 23.5 % (ref 24–44)
MCH RBC QN AUTO: 29.5 PG (ref 27–31)
MCHC RBC AUTO-ENTMCNC: 30.7 % (ref 32–36)
MCV RBC AUTO: 95.9 FL (ref 78–100)
MONOCYTES ABSOLUTE: 0.5 K/CU MM
MONOCYTES RELATIVE PERCENT: 8.1 % (ref 0–4)
PCT TRANSFERRIN: 16 % (ref 10–44)
PDW BLD-RTO: 13.8 % (ref 11.7–14.9)
PLATELET # BLD: 216 K/CU MM (ref 140–440)
PMV BLD AUTO: 9.7 FL (ref 7.5–11.1)
RBC # BLD: 3.7 M/CU MM (ref 4.6–6.2)
SEGMENTED NEUTROPHILS ABSOLUTE COUNT: 4 K/CU MM
SEGMENTED NEUTROPHILS RELATIVE PERCENT: 65.5 % (ref 36–66)
TOTAL IRON BINDING CAPACITY: 352 UG/DL (ref 250–450)
UNSATURATED IRON BINDING CAPACITY: 296 UG/DL (ref 110–370)
WBC # BLD: 6.1 K/CU MM (ref 4–10.5)

## 2023-02-02 PROCEDURE — 85025 COMPLETE CBC W/AUTO DIFF WBC: CPT

## 2023-02-02 PROCEDURE — 83540 ASSAY OF IRON: CPT

## 2023-02-02 PROCEDURE — 82668 ASSAY OF ERYTHROPOIETIN: CPT

## 2023-02-02 PROCEDURE — 82728 ASSAY OF FERRITIN: CPT

## 2023-02-02 PROCEDURE — 36415 COLL VENOUS BLD VENIPUNCTURE: CPT

## 2023-02-02 PROCEDURE — 83550 IRON BINDING TEST: CPT

## 2023-02-03 LAB — EPO SERPL-ACNC: 9 MU/ML (ref 4–27)

## 2023-02-08 ENCOUNTER — TELEPHONE (OUTPATIENT)
Dept: CARDIOLOGY CLINIC | Age: 78
End: 2023-02-08

## 2023-02-09 ENCOUNTER — OFFICE VISIT (OUTPATIENT)
Dept: ONCOLOGY | Age: 78
End: 2023-02-09
Payer: MEDICARE

## 2023-02-09 ENCOUNTER — HOSPITAL ENCOUNTER (OUTPATIENT)
Dept: INFUSION THERAPY | Age: 78
Discharge: HOME | End: 2023-02-09
Payer: MEDICARE

## 2023-02-09 VITALS
DIASTOLIC BLOOD PRESSURE: 56 MMHG | RESPIRATION RATE: 16 BRPM | HEART RATE: 69 BPM | BODY MASS INDEX: 23.4 KG/M2 | HEIGHT: 69 IN | OXYGEN SATURATION: 96 % | WEIGHT: 158 LBS | TEMPERATURE: 97.9 F | SYSTOLIC BLOOD PRESSURE: 120 MMHG

## 2023-02-09 DIAGNOSIS — D50.0 IRON DEFICIENCY ANEMIA SECONDARY TO BLOOD LOSS (CHRONIC): Primary | ICD-10-CM

## 2023-02-09 PROCEDURE — 3078F DIAST BP <80 MM HG: CPT | Performed by: INTERNAL MEDICINE

## 2023-02-09 PROCEDURE — 3074F SYST BP LT 130 MM HG: CPT | Performed by: INTERNAL MEDICINE

## 2023-02-09 PROCEDURE — 1036F TOBACCO NON-USER: CPT | Performed by: INTERNAL MEDICINE

## 2023-02-09 PROCEDURE — G8420 CALC BMI NORM PARAMETERS: HCPCS | Performed by: INTERNAL MEDICINE

## 2023-02-09 PROCEDURE — 99213 OFFICE O/P EST LOW 20 MIN: CPT | Performed by: INTERNAL MEDICINE

## 2023-02-09 PROCEDURE — 99211 OFF/OP EST MAY X REQ PHY/QHP: CPT

## 2023-02-09 PROCEDURE — G8427 DOCREV CUR MEDS BY ELIG CLIN: HCPCS | Performed by: INTERNAL MEDICINE

## 2023-02-09 PROCEDURE — G8484 FLU IMMUNIZE NO ADMIN: HCPCS | Performed by: INTERNAL MEDICINE

## 2023-02-09 PROCEDURE — 1124F ACP DISCUSS-NO DSCNMKR DOCD: CPT | Performed by: INTERNAL MEDICINE

## 2023-02-09 PROCEDURE — 3017F COLORECTAL CA SCREEN DOC REV: CPT | Performed by: INTERNAL MEDICINE

## 2023-02-09 ASSESSMENT — PATIENT HEALTH QUESTIONNAIRE - PHQ9
2. FEELING DOWN, DEPRESSED OR HOPELESS: 0
SUM OF ALL RESPONSES TO PHQ QUESTIONS 1-9: 0
SUM OF ALL RESPONSES TO PHQ9 QUESTIONS 1 & 2: 0
SUM OF ALL RESPONSES TO PHQ QUESTIONS 1-9: 0
1. LITTLE INTEREST OR PLEASURE IN DOING THINGS: 0

## 2023-02-09 NOTE — PROGRESS NOTES
MA Rooming Questions  Patient: Juarez Gardiner  MRN: [id number]    Date: 2/9/2023        1. Do you have any new issues?   no         2. Do you need any refills on medications?    no    3. Have you had any imaging done since your last visit?   no    4. Have you been hospitalized or seen in the emergency room since your last visit here?   no    5. Did the patient have a depression screening completed today?  Yes    PHQ-9 Total Score: 0 (2/9/2023 10:47 AM)       PHQ-9 Given to (if applicable):               PHQ-9 Score (if applicable):                     [] Positive     []  Negative              Does question #9 need addressed (if applicable)                     [] Yes    []  No               Tj Orona CMA

## 2023-03-03 ENCOUNTER — OFFICE VISIT (OUTPATIENT)
Dept: CARDIOLOGY CLINIC | Age: 78
End: 2023-03-03
Payer: MEDICARE

## 2023-03-03 VITALS
BODY MASS INDEX: 23.11 KG/M2 | SYSTOLIC BLOOD PRESSURE: 130 MMHG | WEIGHT: 156 LBS | DIASTOLIC BLOOD PRESSURE: 70 MMHG | HEART RATE: 60 BPM | HEIGHT: 69 IN

## 2023-03-03 DIAGNOSIS — I48.0 PAF (PAROXYSMAL ATRIAL FIBRILLATION) (HCC): Primary | ICD-10-CM

## 2023-03-03 PROCEDURE — 1124F ACP DISCUSS-NO DSCNMKR DOCD: CPT | Performed by: INTERNAL MEDICINE

## 2023-03-03 PROCEDURE — 1036F TOBACCO NON-USER: CPT | Performed by: INTERNAL MEDICINE

## 2023-03-03 PROCEDURE — G8484 FLU IMMUNIZE NO ADMIN: HCPCS | Performed by: INTERNAL MEDICINE

## 2023-03-03 PROCEDURE — 3078F DIAST BP <80 MM HG: CPT | Performed by: INTERNAL MEDICINE

## 2023-03-03 PROCEDURE — G8420 CALC BMI NORM PARAMETERS: HCPCS | Performed by: INTERNAL MEDICINE

## 2023-03-03 PROCEDURE — 3017F COLORECTAL CA SCREEN DOC REV: CPT | Performed by: INTERNAL MEDICINE

## 2023-03-03 PROCEDURE — G8427 DOCREV CUR MEDS BY ELIG CLIN: HCPCS | Performed by: INTERNAL MEDICINE

## 2023-03-03 PROCEDURE — 3074F SYST BP LT 130 MM HG: CPT | Performed by: INTERNAL MEDICINE

## 2023-03-03 PROCEDURE — 99215 OFFICE O/P EST HI 40 MIN: CPT | Performed by: INTERNAL MEDICINE

## 2023-03-03 RX ORDER — CLOPIDOGREL BISULFATE 75 MG/1
75 TABLET ORAL DAILY
Qty: 30 TABLET | Refills: 3 | Status: SHIPPED | OUTPATIENT
Start: 2023-03-03

## 2023-03-03 ASSESSMENT — ENCOUNTER SYMPTOMS
PHOTOPHOBIA: 0
BLOOD IN STOOL: 0
COLOR CHANGE: 0
BACK PAIN: 0
DIARRHEA: 0
NAUSEA: 0
CHEST TIGHTNESS: 0
ABDOMINAL PAIN: 0
WHEEZING: 0
COUGH: 0
VOMITING: 0
CONSTIPATION: 0
SHORTNESS OF BREATH: 0
EYE PAIN: 0

## 2023-03-03 NOTE — PROGRESS NOTES
Electrophysiology FU Note      Reason for consultation: Atrial fibrillation    Chief complaint : HERE FOR DISCUSS WATCHMAN    Referring physician: Dr. Joanie Larson      Primary care physician: Yasmeen Richard MD      History of Present Illness: This visit (3/3/2023)      Chief Complaint   Patient presents with    Atrial Fibrillation     Patient is seen for afib. Patient is here to discuss watchman. Patient denies chest pain, shortness of breath, dizziness, palpitations, and edema. Patient had PAF episode and he was started on Xarelto. Patient then on Xarelto had bleeding in the eye x 2. Then patient was stopped off Xarelto. Patient also has chronic anemia and Dr. Koko Cisneros has been following the patient. Previous visit: (8/5/2022)    Patient is having extreme fatigue. Patient also reports dizziness but did not pass out. Patient had event monitor and he was told it was abnormal and here for further assessment. Patient denies any chest pain, shortness of breath palpitations. He was recently in the hospital with dizziness and near syncope      Previous visit:     aLcie Reilly is a 78year old male with a history of CAD, HTN, Diabetes type 2, dyslipidemia, and iron deficiency anemia presents with complaints of dizziness and near syncope. Patient reports that yesterday he was at [employer] when while standing he began to feel dizzy. He states that he made his way to a seat and still felt dizzy. He listening to the [employer] service but then asked for prayer because he wasn't feeling well. The patient ambulated to a pew where he laid down until the EMS arrived. Patient states that he doesn't think he passed out completely. He states that he was dizzy, his vision became dark but here could here everything around him. He states that after he laid down he felt better. Patient states he was told by EMS that he had atrial fibrillation.  Patient states he has never been told he has afib. He denies chest pain, palpitations, shortness of breath, or edema. He states that in May he had a similar episode however he did lose consciousness for a brief period of time. He did not go to the hospital for this episode. 6/27/2022 ECHO EF 55-60%  4/9/2019 stress test reveals normal myocardial perfusion. On admission potassium is 4.1 and creatinine 1.4. Magnesium 1.8. Troponin <0.010 x3. Pastmedical history:   Past Medical History:   Diagnosis Date    Abdominal Doppler 07/06/2021    Normal study    Acute MI [facility])     1993 - angioplasty, no stent, Dr. Trinh Wiley     CAD (coronary artery disease)     Diabetes mellitus ([facility] [address]     Essential hypertension 08/18/2020    H/O cardiovascular stress test 04/09/2019    Normal study. H/O echocardiogram 04/18/2019    EF 55-60%, Normal study. H/O echocardiogram 12/21/2020    EF 55-60%, Mild MR & LVH. Surgical history :   Past Surgical History:   Procedure Laterality Date    BALLOON ANGIOPLASTY, ARTERY  02/1994    CARDIAC CATHETERIZATION      CATARACT REMOVAL      PACEMAKER PLACEMENT      PACEMAKER PLACEMENT Left 08/23/2022    [employer]: [place] XT DR JESSICA Giron Dual PPM    TOE SURGERY         Family history:   Family History   Problem Relation Age of Onset    Heart Attack Father     Heart Disease Father        Social history :  reports that he quit smoking about 29 years ago. His smoking use included cigarettes. He has a 36.00 pack-year smoking history. He has never used smokeless tobacco. He reports that he does not drink alcohol and does not use drugs.     No Known Allergies    Current Outpatient Medications on File Prior to Visit   Medication Sig Dispense Refill    amiodarone (CORDARONE) 200 MG tablet Take 1 tablet by mouth daily 90 tablet 1    metoprolol tartrate (LOPRESSOR) 25 MG tablet Take 1 tablet by mouth 2 times daily 180 tablet 1    loperamide (IMODIUM A-D) 2 MG tablet Take 1 tablet by mouth 2 times daily      Lancets (ONETOUCH DELICA PLUS [id number]) MISC       ONETOUCH VERIO strip       Coenzyme Q10 (COQ-10) 30 MG CAPS Take 30 mg by mouth daily      fenofibrate (TRICOR) 145 MG tablet Take 145 mg by mouth daily      metFORMIN (GLUCOPHAGE-XR) 500 MG extended release tablet Take 500 mg by mouth 2 times daily (with meals)       b complex vitamins capsule Take 1 capsule by mouth daily      ferrous sulfate 325 (65 Fe) MG tablet Take 325 mg by mouth daily (with breakfast)      Cyanocobalamin (VITAMIN B 12 PO) Take by mouth every other day       aspirin 81 MG tablet Take 81 mg by mouth daily      atorvastatin (LIPITOR) 10 MG tablet Take 10 mg by mouth daily. No current facility-administered medications on file prior to visit. Review of Systems:   Review of Systems   Constitutional:  Negative for activity change, chills, fatigue and fever. HENT:  Negative for congestion, ear pain and tinnitus. Eyes:  Negative for photophobia, pain and visual disturbance. Respiratory:  Negative for cough, chest tightness, shortness of breath and wheezing. Cardiovascular:  Negative for chest pain, palpitations and leg swelling. Gastrointestinal:  Negative for abdominal pain, blood in stool, constipation, diarrhea, nausea and vomiting. Endocrine: Negative for cold intolerance and heat intolerance. Genitourinary:  Negative for dysuria, flank pain and hematuria. Musculoskeletal:  Positive for arthralgias. Negative for back pain, myalgias and neck stiffness. Skin:  Negative for color change and rash. Allergic/Immunologic: Negative for food allergies. Neurological:  Positive for dizziness. Negative for light-headedness, numbness and headaches. Hematological:  Does not bruise/bleed easily. Psychiatric/Behavioral:  Negative for agitation, behavioral problems and confusion.           Examination:      Vitals:    03/03/23 1026   BP: 130/70   Pulse: 60   Weight: 156 lb (70.8 kg)   Height: 5' 8.5\" (1.74 m)        Body mass index is 23.37 kg/m². Physical Exam  Constitutional:       General: He is not in acute distress. Appearance: He is not ill-appearing or diaphoretic. HENT:      Head: Normocephalic and atraumatic. Right Ear: External ear normal.      Left Ear: External ear normal.      Nose: No congestion. Mouth/Throat:      Mouth: Mucous membranes are moist.   Eyes:      Extraocular Movements: Extraocular movements intact. Conjunctiva/sclera: Conjunctivae normal.      Pupils: Pupils are equal, round, and reactive to light. Cardiovascular:      Rate and Rhythm: Normal rate. Rhythm irregular. Heart sounds: No murmur heard. Comments: Frequent PAC  Pulmonary:      Effort: Pulmonary effort is normal. No respiratory distress. Breath sounds: Normal breath sounds. No wheezing or rhonchi. Abdominal:      General: Abdomen is flat. Bowel sounds are normal. There is no distension. Palpations: Abdomen is soft. Tenderness: There is no abdominal tenderness. Musculoskeletal:         General: No tenderness. Cervical back: Normal range of motion. No tenderness. Right lower leg: No edema. Left lower leg: No edema. Skin:     General: Skin is warm. Neurological:      General: No focal deficit present. Mental Status: He is alert and oriented to person, place, and time. Cranial Nerves: No cranial nerve deficit.    Psychiatric:         Mood and Affect: Mood normal.             CBC:   Lab Results   Component Value Date/Time    WBC 6.1 02/02/2023 10:44 AM    HGB 10.9 02/02/2023 10:44 AM    HCT 35.5 02/02/2023 10:44 AM     02/02/2023 10:44 AM     Lipids:  Lab Results   Component Value Date    CHOL 110 03/17/2014    TRIG 73 03/17/2014    HDL 35 (L) 03/17/2014    LDLDIRECT 61 03/17/2014     PT/INR:   Lab Results   Component Value Date/Time    INR 2.63 08/18/2022 02:40 PM        BMP:    Lab Results   Component Value Date     08/18/2022    K 4.6 08/18/2022     08/18/2022    CO2 25 08/18/2022    BUN 20 08/18/2022     CMP:   Lab Results   Component Value Date    AST 26 11/12/2022    PROT 6.0 (L) 11/12/2022    BILITOT 0.2 11/12/2022    ALKPHOS 41 11/12/2022     TSH:  No results found for: TSH, T4    EKGINTERPRETATION - EKG Interpretation: Sinus bradycardia    Echo 6/2022     Left ventricular systolic function is normal.   Ejection fraction is visually estimated at 55-60%. Mild left ventricular hypertrophy. Grade II diastolic dysfunction. Sclerotic, but non-stenotic aortic valve. No evidence of any pericardial effusion. IMPRESSION / RECOMMENDATIONS:     Bleeding and chronic anemia  Sick sinus syndrome sp pacemaker    1. PAF /PAT  2. Near syncope  3. CAD  4. DM-2      Patient had PAF episode and he was started on Xarelto. Patient then on Xarelto had bleeding in the eye x 2. Then patient was stopped off Xarelto. Patient also has chronic anemia and Dr. Anne Marie Alvarez has been following the patient. Most recent hemoglobin was 8.2 in December patient had hemoglobin of 6.7 in October then improved to ten 8.2 and now in February it was 10.9. Still has microcytic hypochromic anemia. Patient has been told that he should be considering watchman and Dr. Anne Marie Alvarez also discussed with him about the same.     Chads-vas  atleast 4  age, cad, DM-2        HAS-BLED Score                            Risk Factors      Points   Hypertension  Uncontrolled, >989 mmHg systolic   No=0   Renal disease  Dialysis, transplant, Cr>2.26 mg/dL or >200 µmol/L   No=0   Liver disease   Cirrhosis or bilirubin >2x normal with AST/ALT/AP >3x normal   No=0   Stroke history   No=0   Prior major bleeding or predisposition to bleeding     Yes=1   Labile  INR  Unstable/high INRs, time in therapeutic range <60%   No=0   Age >71   Yes=1   Medication usage predisposing to bleeding   Aspirin, clopidogrel, NSAIDs   Yes=1   Alcohol use   >7 drinks/week   No=0       HAS-BLED Score:    3:  Risk was 5.8% in one validation study and 3.72 bleeds per 100 patient-years in another validation study. Patient has risk of stroke with out anticoagulation and has risk of bleeding with it so he will be candidate for ROSA closure device    Patient will need to be able to tolerate aspirin and plavix for 6 months for watchman device  So I would like to see if patient able to tolerate medication  Also patient needs to have no ROSA clot preprocedure. Will start aspirin and plavix now (its also class IIa recommendation for patients with history of atrial fibrillation who cannot tolerate anticoagulation)     Discussed with patient about left atrial appendage closure device role in detail. We are currently doing only watchman so we discussed about the device in detail. We discussed that this device is only used for patients who are unable to tolerate long term anticoagulation because of bleeding or falls or other reasons where they are high risk with anticoagulation     Patient may still need to be on anticoagulant and aspirin for 45 days post procedure and we do a VAL and if VAL shows there is a good seal of LA appendage with device then we change to aspirin and plavix (or other equivalent) for 6 months and then aspirin only after that. According to the new FDA recommendations patient can be only aspirin and plavix for 6 months post procedure and then aspirin only. We may consider this approach in patient case by case basis provided they do not have any ROSA clot at the time of implantation. The risks including, but not limited to, vascular injury, bleeding, infection, radiation exposure, injury to cardiac and surrounding structures (including esophageal and pulmonary vein injury), injury to the normal conduction system of the heart (possibly requiring a pacemaker), Pericardial effusion or cardiac puncture, stroke, myocardial infarction and death were all discussed.  The patient considered the risks, benefits and alternatives; decided to proceed with the procedure. Education provided to the patient and also education material given to the patient. Thanks again for allowing me to participate in care of this patient. Please call me if you have any questions. With best regards. Candace Stanton MD, 3/3/2023 10:42 AM     Please note this report has been partially produced using speech recognition software and may contain errors related to that system including errors in grammar, punctuation, and spelling, as well as words and phrases that may be inappropriate. If there are any questions or concerns please feel free to contact the dictating provider for clarification.

## 2023-03-03 NOTE — PROGRESS NOTES
Saw patient in the office with Dr Corazon Avilez. My role as Watchman Coordinator explained. Watchman discussed, Brochure provided and [employer] shared. [employer] utilized and filled out. Patient to take the [employer] with them for shared decision making appointment. Explained to the patient:  Part of the FDA approval of the Watchman procedure in 2015 mandated that each procedure must participate in a national registry, this requires several follow up appointments and testing following the procedure. These expectations Include:      7-10 day follow up with the Nurse practitioner or Dr Chandra Must    45 day follow up lab work and VAL to check positioning of the device. 6 month follow up telephone call     1 year follow up appointment, VAL and lab work     2  year follow up appointment and lab work . Discussed the importance of blood thinners as prescribed and that the patient cannot come off those blood thinners until discontinued by the implanting physician. Patient has no surgery or procedures planned that would disrupt these needed blood thinners. Denies:  [x]  Nickel or metal allergy  [x]  Contrast allergy  [x]  Anesthesia issues  [x]  Difficulty swallowing  [x]  No procedures/surgeries planned that would interrupt Plavix and ASA for next 6 months    All questions and concerns answered. Plan procedure for April. Patient recently discussed Watchman with Dr Juan Davis.  Will send Shared Decision paperwork to Dr Cristiane Toledo,  Will follow    Romy Prado RN  [address] [place] [address] Coordinator

## 2023-03-03 NOTE — LETTER
[facility]     Dr. Jeffrey Ferreira     Transesophageal Echocardiogram    Patient Name: Sejal Murcia  : 1945  MRN# [id number]     Date of Procedure: 4/3/2023 Time: 1100 Arrival Time: [address]: [facility])     Call to [facility] at [phone] 2 days before your procedure    X Please have COVID-19 Test done on 3/30/2023 at the [place] [address]. You will need to Quarantine yourself until procedure. X Please do not have anything by mouth after midnight prior to or 8 hours before the procedure. X You may take your medication with a sip of water unless advised otherwise below. X  Hold Metformin 24 hours before the procedure until you may restart metformin 2 days after the procedure on 2023. IMPORTANT NOTICE TO PATIENTS: Prior Authorization  We will contact your insurance for prior authorization for the CPT code related to the procedure it is the patient's responsibility to contact their insurance to ensure they are following their medical plans provisions or requirements! Patient Signature:____________________________     Staff Prepared: America Milton MA     Staff Given Instructions:_______________________________                        [place] [facility]     Transesophageal Echocardiogram  What is a transesophageal echocardiogram?  A transesophageal echocardiogram is a test to help your doctor look at the inside of your heart. A small device called a transducer directs sound waves toward your heart. The sound waves make a picture of the heart's valves and chambers. Your doctor may do this test to look for certain types of heart disease. Or it may be done to see how disease is affecting your heart. You will be given medicine to make you sleepy and comfortable during the test. The doctor puts a small, flexible tube into your throat and guides it to the esophagus. This is the tube that connects your mouth to your stomach.  The doctor will ask you to swallow as the tube goes down. The transducer is at the tip of the tube. It gets close to your heart to make clear pictures. The doctor will look at the ultrasound pictures on a screen. You will not be able to eat or drink until the numbness from the throat spray wears off. Your throat may be sore for a few days after the test.  Follow-up care is a key part of your treatment and safety. Be sure to make and go to all appointments, and call your doctor if you are having problems. It's also a good idea to know your test results and keep a list of the medicines you take. CARDIOVERSION  What is cardioversion? Cardioversion helps your heart return to a normal rhythm. It treats problems like atrial fibrillation. It is also sometimes used in emergencies. It can correct a fast heartbeat that causes low blood pressure, chest pain, or heart failure. Your doctor may ask you to take medicines before the treatment. These help prevent blood clots. Your doctor will watch you closely to make sure that there are no problems. Electrical cardioversion: The electrical procedure is done in a hospital. You will get medicine to help you relax and control the pain. Your doctor will put paddles or patches on your chest and back. These send an electric current to your heart. This resets your heart rhythm. The electrical part takes about 5 minutes. But you will probably be in the hospital for 1 to 2 hours. You will need to recover from the effects of the pain medicine. Chemical cardioversion: The chemical procedure is most often done in a hospital. In most cases, the medicine is put into your arm through a tube called an IV. But you may get medicines to take by mouth. You may feel a quick sting or pinch when the IV starts. The procedure usually takes about 30 to 60 minutes for procedure.                              [address]/CARDIOLOGY        Patient Name: Teena Margarito : 1945  MRN# [id number]    Transesophageal Echocardiogram    Day of Procedure Cath Lab Holding Area Preop Orders:    ? YOU HAVE MY PERMISSION TO TALK TO THE PATIENT-PLEASE    ? Anesthesia    ? VAL with Anesthesia    ? IV peripheral saline lock  (preferred left arm). ? STAT LABS ON ARRIVAL: BMP, MAG, PHOS, CBC, PT INR, PTT    ? If taking Coumadin, PT INR  STAT on arrival day of procedure. ? 12 lead EKG STAT on arrival day of procedure. ? Diabetic patients >> Accu check Blood sugar check. ? Document home medications in EPIC and include date and time of last dose.    ? NPO.    ? Notify Dr. Caesar King of abnormal lab results. ? Chest Prep> Clip hair anterior chest and posterior back. ? If Tikosyn or Sotalol loading  Planned >>3 Kin bed            Physician Signature:___________________Date:___________Time:____________                               *This consent is applicable for 30 days following patient signature*    Miranda Kirk / PROCEDURE    I (We), Macario Carrera authorize, Dr. Paul Palmer   and such assistants as may be selected by him/her, to perform the following operation/procedures:  Transesophageal Echocardiogram    Note: If unable to obtain consent prior to an emergent procedure, document the emergent reason in the medical record. This procedure has been explained to my (our) satisfaction and included in the explanation was:   A) the intended benefit, nature, and extent of the procedure to be performed;   B) the significant risks involved and the probability of success;   C) alternative procedures and methods of treatment;   D) the dangers and probable consequences of such alternatives (including no procedure or treatment);    E) the expected consequences of the procedure on my future health;   F) whether other qualified individuals would be performing important surgical tasks and / or whether  would be present to advise or support the procedure. I (we) understand that there are other risks of infection and other serious complications in the pre-operative/procedural and postoperative/procedural stages of my (our) care. I (we) have asked all of the questions which I (we) thought were important in deciding whether or not to undergo treatment or diagnosis. These questions have been answered to my (our) satisfaction. I (we) understand that no assurance can be given that the procedure will be a success, and no guarantee or warranty of success has been given to me (us). 2. It has been explained to me (us) that during the course of the operation/procedure, unforeseen conditions may be revealed that necessitate extension of the original procedure(s) or different procedure(s) than those set forth in Paragraph 1. I (we) authorize and request that the above-named physician, his/her assistants or his/her designees, perform procedures as necessary and desirable if deemed to be in my (our) best interest.         3. I acknowledge that other health care personnel may be observing this procedure for the purpose of medical education or other specified purposes as may be necessary as requested and/or approved by my (our) physician. 4. I (we) consent to the disposal by the hospital Pathologist of the removed tissue, parts or organs in accordance with hospital policy. 5. I do_____ do not______ consent to the use of a local infiltration pain blocking agent that will be used by my provider/surgical provider to help alleviate pain during my procedure. 6. I do_____ do not_____ consent to an emergent blood transfusion in the case of a life-threatening situation that requires blood components to be administered. This consent is valid for 24 hours from the beginning of the procedure. 7. This patient does _____ or does not ______currently have a DNR status/order.  If DNR order is in place, obtain \"Addendum to the Surgical Consent for ALL Patients with a DNR Order\" to address zach-operative status for limited intervention or DNR suspension. 8. I have read and fully understand the above Consent for Operation/Procedure and that all blanks were completed before I signed the consent.     ____________________________________________  _____/____am/pm   Signature of Patient or legal representative Printed Name / Relationship Date / Time     _____________________________________________  _____/____ am/pm   Witness to Signature Printed Name Date / Time     Informed consent:   I have provided the explanation described above in section 1 to the patient and/or legal representative.  I have provided the patient and/or legal representative with an opportunity to ask any questions about the proposed operation/procedure.     _________________________________Dr.Pradeep Chip Raman ____/____ am/pm   Provider / Proceduralist Printed Name Date / Time       Revised 2021                           [place] Franc Dr. Sheldon Favre TO SCHEDULE:    Transesophageal Echocardiogram    Patient Name: Sejal Murcia   : 1945  MRN# [id number]    Home Phone Number: [phone]   Weight:    Wt Readings from Last 3 Encounters:   23 156 lb (70.8 kg)   23 158 lb (71.7 kg)   23 160 lb (72.6 kg)        Insurance: Payor: Boris Menendez / Plan: MEDICARE PART A AND B / Product Type: *No Product type* /     Date of Procedure: 4/3/2023 Time: 1100 Arrival Time: 1000    Diagnosis:  A fib   Allergies: No Known Allergies           America Milton MA

## 2023-03-07 ENCOUNTER — TELEPHONE (OUTPATIENT)
Dept: CARDIOLOGY CLINIC | Age: 78
End: 2023-03-07

## 2023-03-07 NOTE — TELEPHONE ENCOUNTER
Watchman and VAL scheduled. Patient called and notified of dates and times. Has not started Plavix yet but will start today. It was ordered Friday. All questions verbalized were answered. Will follow.   Electronically signed by Champ [place] RN on 3/7/2023 at 9:40 AM [address] Coordinator

## 2023-03-22 ENCOUNTER — OFFICE (OUTPATIENT)
Dept: URBAN - METROPOLITAN AREA CLINIC 18 | Facility: CLINIC | Age: 78
End: 2023-03-22
Payer: COMMERCIAL

## 2023-03-22 VITALS
HEIGHT: 69 IN | WEIGHT: 159 LBS | DIASTOLIC BLOOD PRESSURE: 62 MMHG | HEART RATE: 64 BPM | SYSTOLIC BLOOD PRESSURE: 120 MMHG

## 2023-03-22 DIAGNOSIS — K52.832 LYMPHOCYTIC COLITIS: ICD-10-CM

## 2023-03-22 PROCEDURE — 99213 OFFICE O/P EST LOW 20 MIN: CPT | Performed by: INTERNAL MEDICINE

## 2023-03-24 ENCOUNTER — OFFICE VISIT (OUTPATIENT)
Dept: CARDIOLOGY CLINIC | Age: 78
End: 2023-03-24

## 2023-03-24 VITALS
HEIGHT: 69 IN | BODY MASS INDEX: 23.55 KG/M2 | HEART RATE: 60 BPM | DIASTOLIC BLOOD PRESSURE: 72 MMHG | WEIGHT: 159 LBS | SYSTOLIC BLOOD PRESSURE: 138 MMHG

## 2023-03-24 DIAGNOSIS — I48.0 PAF (PAROXYSMAL ATRIAL FIBRILLATION) (HCC): Primary | ICD-10-CM

## 2023-03-24 NOTE — PROGRESS NOTES
Atrial Fibrillation CHADSVASC2 Score Stroke Risk:   78 y.o. > 76 - 2    male Male - 0   CHF HX: No - 0   HTN HX: Yes - 1   Stroke/TIA/Thromboembolism No - 0   Vascular Disease HX: Yes - 1   Diabetes Mellitus Yes - 1   CHADSVASC 2 Score 5      Annual Stroke Risk 7.2% - moderate-high      When Pt was started on Amiodarone:     Test                     Date of last test  EKG                     [x]  12/19/22  PFT                      [x] 12/16/22                                                                        CXR                     [x] 8/23/22                                                                       THYROID            [x] 11/12/22                                               LFT                      [x]  6/26/22                                        EYE EXAM          [x] 2023
Value Date    ALT 20 11/12/2022    AST 26 11/12/2022     TSH:  No results found for: TSH    Impression:  Yoon Best is a 78 y. o.year old who  has a past medical history of Abdominal Doppler, Acute MI ([facility] [address] Arthritis, CAD (coronary artery disease), Diabetes mellitus ([facility] [address] Essential hypertension, H/O cardiovascular stress test, H/O echocardiogram, and H/O echocardiogram. and presents with     Plan: For watchman device due to Gi bleeding  RT RETINAL HEMORRHAGE\": Off XARELTO AND continue ASPIRIN and plavix, , RECOMMEND TO FOLLOW UP WITH DR. DHALIWAL and get back to us for final decision regarding aspirin and xarelto. Presyncope WITH SICK SINUS WITH AFIB, S/P PPM NOW. EKG reviewed with Dr. Jn Lopez. FEELS BETTER, CONTINUE AMIODARONE, off XARELT due to black stool, WILL CHECK LFTS, PFT , TSH  Black stool: recommend to follow up with GI,had  EGD, will get watchman device  PAF: h/o cardioversion, continue amiodarone and lopress\or off xarelto continue aspirin, PACER INTERROGATED. HE REMAINS APVP, may get off amiodarone in neat future. CAD he has history of PTCA in 1994 and an L3 recommend he continue aspirin and statins and metoprolol  Diabetes controlled continue metformin lipidemia continue statins  Hypertension,CONTINUE lopressor,   All labs, medications and tests reviewed, continue all other medications of all above medical condition listed as is.     [unfilled]

## 2023-03-30 ENCOUNTER — HOSPITAL ENCOUNTER (OUTPATIENT)
Age: 78
Setting detail: SPECIMEN
Discharge: HOME | End: 2023-03-30
Payer: MEDICARE

## 2023-03-30 ENCOUNTER — HOSPITAL ENCOUNTER (OUTPATIENT)
Age: 78
Discharge: HOME | End: 2023-03-30
Payer: MEDICARE

## 2023-03-30 ENCOUNTER — HOSPITAL ENCOUNTER (OUTPATIENT)
Dept: GENERAL RADIOLOGY | Age: 78
Discharge: HOME | End: 2023-03-30
Payer: MEDICARE

## 2023-03-30 ENCOUNTER — NURSE ONLY (OUTPATIENT)
Dept: CARDIOLOGY CLINIC | Age: 78
End: 2023-03-30

## 2023-03-30 DIAGNOSIS — Z79.01 LONG TERM (CURRENT) USE OF ANTICOAGULANTS: ICD-10-CM

## 2023-03-30 DIAGNOSIS — I48.91 ATRIAL FIBRILLATION, UNSPECIFIED TYPE (HCC): ICD-10-CM

## 2023-03-30 DIAGNOSIS — Z01.810 PRE-OPERATIVE CARDIOVASCULAR EXAMINATION: ICD-10-CM

## 2023-03-30 DIAGNOSIS — I48.0 PAF (PAROXYSMAL ATRIAL FIBRILLATION) (HCC): Primary | ICD-10-CM

## 2023-03-30 LAB
ANION GAP SERPL CALCULATED.3IONS-SCNC: 9 MMOL/L (ref 4–16)
APTT: 28.3 SECONDS (ref 25.1–37.1)
BASOPHILS ABSOLUTE: 0.1 K/CU MM
BASOPHILS RELATIVE PERCENT: 1.1 % (ref 0–1)
BUN SERPL-MCNC: 22 MG/DL (ref 6–23)
CALCIUM SERPL-MCNC: 9.6 MG/DL (ref 8.3–10.6)
CHLORIDE BLD-SCNC: 101 MMOL/L (ref 99–110)
CO2: 28 MMOL/L (ref 21–32)
CREAT SERPL-MCNC: 1.4 MG/DL (ref 0.9–1.3)
DIFFERENTIAL TYPE: ABNORMAL
EOSINOPHILS ABSOLUTE: 0.1 K/CU MM
EOSINOPHILS RELATIVE PERCENT: 2.3 % (ref 0–3)
GFR SERPL CREATININE-BSD FRML MDRD: 52 ML/MIN/1.73M2
GLUCOSE SERPL-MCNC: 82 MG/DL (ref 70–99)
HCT VFR BLD CALC: 34.6 % (ref 42–52)
HEMOGLOBIN: 10.7 GM/DL (ref 13.5–18)
IMMATURE NEUTROPHIL %: 0.5 % (ref 0–0.43)
INR BLD: 0.96 INDEX
LYMPHOCYTES ABSOLUTE: 1.5 K/CU MM
LYMPHOCYTES RELATIVE PERCENT: 25.1 % (ref 24–44)
MAGNESIUM: 1.8 MG/DL (ref 1.8–2.4)
MCH RBC QN AUTO: 29.1 PG (ref 27–31)
MCHC RBC AUTO-ENTMCNC: 30.9 % (ref 32–36)
MCV RBC AUTO: 94 FL (ref 78–100)
MONOCYTES ABSOLUTE: 0.5 K/CU MM
MONOCYTES RELATIVE PERCENT: 8 % (ref 0–4)
NUCLEATED RBC %: 0 %
PDW BLD-RTO: 15.9 % (ref 11.7–14.9)
PHOSPHORUS: 3.4 MG/DL (ref 2.5–4.9)
PLATELET # BLD: 240 K/CU MM (ref 140–440)
PMV BLD AUTO: 10.1 FL (ref 7.5–11.1)
POTASSIUM SERPL-SCNC: 4.5 MMOL/L (ref 3.5–5.1)
PROTHROMBIN TIME: 12.2 SECONDS (ref 11.7–14.5)
RBC # BLD: 3.68 M/CU MM (ref 4.6–6.2)
SEGMENTED NEUTROPHILS ABSOLUTE COUNT: 3.8 K/CU MM
SEGMENTED NEUTROPHILS RELATIVE PERCENT: 63 % (ref 36–66)
SODIUM BLD-SCNC: 138 MMOL/L (ref 135–145)
TOTAL IMMATURE NEUTOROPHIL: 0.03 K/CU MM
TOTAL NUCLEATED RBC: 0 K/CU MM
WBC # BLD: 6.1 K/CU MM (ref 4–10.5)

## 2023-03-30 PROCEDURE — 83735 ASSAY OF MAGNESIUM: CPT

## 2023-03-30 PROCEDURE — 85730 THROMBOPLASTIN TIME PARTIAL: CPT

## 2023-03-30 PROCEDURE — 36415 COLL VENOUS BLD VENIPUNCTURE: CPT

## 2023-03-30 PROCEDURE — 85610 PROTHROMBIN TIME: CPT

## 2023-03-30 PROCEDURE — 84100 ASSAY OF PHOSPHORUS: CPT

## 2023-03-30 PROCEDURE — U0003 INFECTIOUS AGENT DETECTION BY NUCLEIC ACID (DNA OR RNA); SEVERE ACUTE RESPIRATORY SYNDROME CORONAVIRUS 2 (SARS-COV-2) (CORONAVIRUS DISEASE [COVID-19]), AMPLIFIED PROBE TECHNIQUE, MAKING USE OF HIGH THROUGHPUT TECHNOLOGIES AS DESCRIBED BY CMS-2020-01-R: HCPCS

## 2023-03-30 PROCEDURE — 80048 BASIC METABOLIC PNL TOTAL CA: CPT

## 2023-03-30 PROCEDURE — 85025 COMPLETE CBC W/AUTO DIFF WBC: CPT

## 2023-03-30 PROCEDURE — 71046 X-RAY EXAM CHEST 2 VIEWS: CPT

## 2023-03-30 PROCEDURE — U0005 INFEC AGEN DETEC AMPLI PROBE: HCPCS

## 2023-03-30 NOTE — PROGRESS NOTES
Patient here in office and educated on VAL, schedule for 4/3/23 @ 1100, with arrival @ 1000, @ [facility]; risk explained; and consents signed. NPO after midnight the night before procedure; call hospital at [phone] to pre-register. Hold Metformin the day before, the day of and two days after procedure. Patient voiced understanding. Copies of consent & info scanned in chart. Patient performed COVID throat swab for 10 seconds  Patient was wearing a mask  This CMA, LPN, RN present in the room, 6 feet away. Patient dropped swab in  labeled collection tube. Lab order, facesheet and copy of insurance card placed in collection bag. Bag placed in biohazard bag and placed in fridge.  called for .

## 2023-03-30 NOTE — PROGRESS NOTES
Patient here in office and educated on Watchman Implant, schedule for 4/6/23 @ 1000, with arrival @ 800, @ [facility]; risk explained; and consents signed. Also copy of orders given for labs and CXR due 3/30/23 at [facility]. Instruction given to patient to :  NPO after midnight the night before procedure; call hospital at [phone] to pre-register. May take rest of morning meds of procedure except Blood Pressure Medicine. Hold Metformin the day before, the day of and two days after procedure. Patient voiced understanding. Copies of consent & info scanned in chart. Patient performed COVID throat swab for 10 seconds  Patient was wearing a mask  This CMA, LPN, RN present in the room, 6 feet away. Patient dropped swab in  labeled collection tube. Lab order, facesheet and copy of insurance card placed in collection bag. Bag placed in biohazard bag and placed in fridge.  called for .

## 2023-03-31 LAB
SARS-COV-2 RNA RESP QL NAA+PROBE: NOT DETECTED
SOURCE: NORMAL

## 2023-04-02 ENCOUNTER — ANESTHESIA EVENT (OUTPATIENT)
Dept: NON INVASIVE DIAGNOSTICS | Age: 78
End: 2023-04-02

## 2023-04-03 ENCOUNTER — HOSPITAL ENCOUNTER (OUTPATIENT)
Dept: NON INVASIVE DIAGNOSTICS | Age: 78
Discharge: HOME | End: 2023-04-03
Payer: MEDICARE

## 2023-04-03 ENCOUNTER — ANESTHESIA (OUTPATIENT)
Dept: NON INVASIVE DIAGNOSTICS | Age: 78
End: 2023-04-03

## 2023-04-03 VITALS
DIASTOLIC BLOOD PRESSURE: 66 MMHG | OXYGEN SATURATION: 98 % | HEART RATE: 62 BPM | SYSTOLIC BLOOD PRESSURE: 127 MMHG | RESPIRATION RATE: 15 BRPM

## 2023-04-03 LAB
LV EF: 55 %
LVEF MODALITY: NORMAL

## 2023-04-03 PROCEDURE — 2500000003 HC RX 250 WO HCPCS

## 2023-04-03 PROCEDURE — 93312 ECHO TRANSESOPHAGEAL: CPT

## 2023-04-03 PROCEDURE — 7100000000 HC PACU RECOVERY - FIRST 15 MIN

## 2023-04-03 PROCEDURE — 3700000000 HC ANESTHESIA ATTENDED CARE

## 2023-04-03 PROCEDURE — 7100000001 HC PACU RECOVERY - ADDTL 15 MIN

## 2023-04-03 PROCEDURE — 2580000003 HC RX 258: Performed by: NURSE ANESTHETIST, CERTIFIED REGISTERED

## 2023-04-03 PROCEDURE — 6360000002 HC RX W HCPCS

## 2023-04-03 PROCEDURE — 6360000002 HC RX W HCPCS: Performed by: NURSE ANESTHETIST, CERTIFIED REGISTERED

## 2023-04-03 PROCEDURE — 3700000001 HC ADD 15 MINUTES (ANESTHESIA)

## 2023-04-03 PROCEDURE — 2500000003 HC RX 250 WO HCPCS: Performed by: NURSE ANESTHETIST, CERTIFIED REGISTERED

## 2023-04-03 RX ORDER — LIDOCAINE HYDROCHLORIDE 20 MG/ML
INJECTION, SOLUTION INFILTRATION; PERINEURAL PRN
Status: DISCONTINUED | OUTPATIENT
Start: 2023-04-03 | End: 2023-04-03 | Stop reason: SDUPTHER

## 2023-04-03 RX ORDER — SODIUM CHLORIDE, SODIUM LACTATE, POTASSIUM CHLORIDE, CALCIUM CHLORIDE 600; 310; 30; 20 MG/100ML; MG/100ML; MG/100ML; MG/100ML
INJECTION, SOLUTION INTRAVENOUS CONTINUOUS PRN
Status: DISCONTINUED | OUTPATIENT
Start: 2023-04-03 | End: 2023-04-03 | Stop reason: SDUPTHER

## 2023-04-03 RX ORDER — PROPOFOL 10 MG/ML
INJECTION, EMULSION INTRAVENOUS PRN
Status: DISCONTINUED | OUTPATIENT
Start: 2023-04-03 | End: 2023-04-03 | Stop reason: SDUPTHER

## 2023-04-03 RX ADMIN — PROPOFOL 50 MG: 10 INJECTION, EMULSION INTRAVENOUS at 10:51

## 2023-04-03 RX ADMIN — SODIUM CHLORIDE, POTASSIUM CHLORIDE, SODIUM LACTATE AND CALCIUM CHLORIDE: 600; 310; 30; 20 INJECTION, SOLUTION INTRAVENOUS at 10:43

## 2023-04-03 RX ADMIN — LIDOCAINE HYDROCHLORIDE 110 MG: 20 INJECTION, SOLUTION INFILTRATION; PERINEURAL at 10:51

## 2023-04-03 ASSESSMENT — ENCOUNTER SYMPTOMS
EYE PAIN: 0
COUGH: 0
BLOOD IN STOOL: 0
SHORTNESS OF BREATH: 0
BACK PAIN: 0
NAUSEA: 0
CONSTIPATION: 0
CHEST TIGHTNESS: 0
ABDOMINAL PAIN: 0
WHEEZING: 0
PHOTOPHOBIA: 0
VOMITING: 0
COLOR CHANGE: 0
DIARRHEA: 0

## 2023-04-03 NOTE — H&P
for agitation, behavioral problems and confusion. Examination:      Vitals:    04/03/23 1013   BP: (!) 163/72   Pulse: 62   Resp: 12   SpO2: 100%            Physical Exam  Constitutional:       General: He is not in acute distress. Appearance: He is not ill-appearing or diaphoretic. HENT:      Head: Normocephalic and atraumatic. Right Ear: External ear normal.      Left Ear: External ear normal.      Nose: No congestion. Mouth/Throat:      Mouth: Mucous membranes are moist.   Eyes:      Extraocular Movements: Extraocular movements intact. Conjunctiva/sclera: Conjunctivae normal.      Pupils: Pupils are equal, round, and reactive to light. Cardiovascular:      Rate and Rhythm: Normal rate. Rhythm irregular. Heart sounds: No murmur heard. Comments: Frequent PAC  Pulmonary:      Effort: Pulmonary effort is normal. No respiratory distress. Breath sounds: Normal breath sounds. No wheezing or rhonchi. Abdominal:      General: Abdomen is flat. Bowel sounds are normal. There is no distension. Palpations: Abdomen is soft. Tenderness: There is no abdominal tenderness. Musculoskeletal:         General: No tenderness. Cervical back: Normal range of motion. No tenderness. Right lower leg: No edema. Left lower leg: No edema. Skin:     General: Skin is warm. Neurological:      General: No focal deficit present. Mental Status: He is alert and oriented to person, place, and time. Cranial Nerves: No cranial nerve deficit.    Psychiatric:         Mood and Affect: Mood normal.             CBC:   Lab Results   Component Value Date/Time    WBC 6.1 03/30/2023 02:41 PM    HGB 10.7 03/30/2023 02:41 PM    HCT 34.6 03/30/2023 02:41 PM     03/30/2023 02:41 PM     Lipids:  Lab Results   Component Value Date    CHOL 110 03/17/2014    TRIG 73 03/17/2014    HDL 35 (L) 03/17/2014    LDLDIRECT 61 03/17/2014     PT/INR:   Lab Results   Component Value

## 2023-04-03 NOTE — ANESTHESIA PRE PROCEDURE
Department of Anesthesiology  Preprocedure Note       Name:  Yolanda Maya   Age:  78 y.o.  :  1945                                          MRN:  [id number]         Date:  4/3/2023      Surgeon: * No surgeons listed *    Procedure: * No procedures listed *    Medications prior to admission:   Prior to Admission medications    Medication Sig Start Date End Date Taking? Authorizing Provider   clopidogrel (PLAVIX) 75 MG tablet Take 1 tablet by mouth daily 3/3/23   Tianna Esposito MD   amiodarone (CORDARONE) 200 MG tablet Take 1 tablet by mouth daily 2/1/23 3/24/23  Micah Rater, APRN - CNP   metoprolol tartrate (LOPRESSOR) 25 MG tablet Take 1 tablet by mouth 2 times daily 2/1/23 3/24/23  Micah Rater, ANNABELLA - CNP   loperamide (IMODIUM A-D) 2 MG tablet Take 1 tablet by mouth 2 times daily 3/25/22   Historical Provider, MD   Lancets ([address] [place]) [address]  1/3/22   [facility], MD [facility]  1/3/22   Historical Provider, MD   Coenzyme Q10 (COQ-10) 30 MG CAPS Take 30 mg by mouth daily    Historical Provider, MD   fenofibrate (TRICOR) 145 MG tablet Take 145 mg by mouth daily 8/3/18   Historical Provider, MD   metFORMIN (GLUCOPHAGE-XR) 500 MG extended release tablet Take 500 mg by mouth 2 times daily (with meals)     Historical Provider, MD   b complex vitamins capsule Take 1 capsule by mouth daily    Historical Provider, MD   ferrous sulfate 325 (65 Fe) MG tablet Take 325 mg by mouth daily (with breakfast)    Historical Provider, MD   Cyanocobalamin (VITAMIN B 12 PO) Take by mouth every other day     Historical Provider, MD   aspirin 81 MG tablet Take 81 mg by mouth daily    Historical Provider, MD   atorvastatin (LIPITOR) 10 MG tablet Take 10 mg by mouth daily.     Historical Provider, MD       Current medications:    Current Outpatient Medications   Medication Sig Dispense Refill    clopidogrel (PLAVIX) 75 MG tablet Take 1 tablet by mouth daily 30 tablet 3   

## 2023-04-03 NOTE — PROGRESS NOTES
Discharge instructions given with voiced understanding. To follow up with watchman procedure this week. Discharged to home with wife.

## 2023-04-03 NOTE — ANESTHESIA POSTPROCEDURE EVALUATION
Department of Anesthesiology  Postprocedure Note    Patient: Soumya Alvarez  MRN: [id number]  YOB: 1945  Date of evaluation: 4/3/2023      Procedure Summary     Date: 04/03/23 Room / Location: [facility] Stress Lab    Anesthesia Start: 9285 Anesthesia Stop: 1059    Procedure: TRANSESOPHAGEAL ECHOCARDIOGRAM Diagnosis:     Scheduled Providers:  Responsible Provider: Rosario Almendarez DO    Anesthesia Type: MAC ASA Status: 3          Anesthesia Type: MAC    Joe Phase I: Joe Score: 9    Joe Phase II:        Anesthesia Post Evaluation    Patient location during evaluation: bedside  Patient participation: complete - patient participated  Level of consciousness: sleepy but conscious  Pain score: 0  Airway patency: patent  Nausea & Vomiting: no nausea and no vomiting  Complications: no  Cardiovascular status: blood pressure returned to baseline and hemodynamically stable  Respiratory status: acceptable  Hydration status: stable

## 2023-04-06 ENCOUNTER — ANESTHESIA EVENT (OUTPATIENT)
Dept: CARDIAC CATH/INVASIVE PROCEDURES | Age: 78
End: 2023-04-06
Payer: MEDICARE

## 2023-04-06 ENCOUNTER — ANESTHESIA (OUTPATIENT)
Dept: CARDIAC CATH/INVASIVE PROCEDURES | Age: 78
End: 2023-04-06
Payer: MEDICARE

## 2023-04-06 ENCOUNTER — HOSPITAL ENCOUNTER (INPATIENT)
Dept: CARDIAC CATH/INVASIVE PROCEDURES | Age: 78
LOS: 1 days | Discharge: HOME | End: 2023-04-06
Attending: INTERNAL MEDICINE | Admitting: INTERNAL MEDICINE
Payer: MEDICARE

## 2023-04-06 VITALS
DIASTOLIC BLOOD PRESSURE: 71 MMHG | SYSTOLIC BLOOD PRESSURE: 108 MMHG | HEIGHT: 69 IN | BODY MASS INDEX: 23.55 KG/M2 | TEMPERATURE: 97.4 F | WEIGHT: 159 LBS | OXYGEN SATURATION: 98 % | RESPIRATION RATE: 15 BRPM | HEART RATE: 61 BPM

## 2023-04-06 PROBLEM — Z95.818 PRESENCE OF WATCHMAN LEFT ATRIAL APPENDAGE CLOSURE DEVICE: Status: ACTIVE | Noted: 2023-04-06

## 2023-04-06 LAB
BASE EXCESS MIXED: 0.9 (ref 0–1.2)
BASE EXCESS: ABNORMAL (ref 0–3.3)
CO2: 26 MMOL/L (ref 21–32)
EGFR, POC: 42 ML/MIN/1.73M2
GLUCOSE BLD-MCNC: 124 MG/DL (ref 70–99)
GLUCOSE BLD-MCNC: 132 MG/DL (ref 70–99)
GLUCOSE BLD-MCNC: 146 MG/DL (ref 70–99)
HCO3 ARTERIAL: 25.2 MMOL/L (ref 18–23)
HCT VFR BLD CALC: 29 % (ref 42–52)
HEMOGLOBIN: 9.9 GM/DL (ref 13.5–18)
LV EF: 58 %
LVEF MODALITY: NORMAL
O2 SATURATION: 100 % (ref 96–97)
PCO2 ARTERIAL: 38.1 MMHG (ref 32–45)
PH BLOOD: 7.43 (ref 7.34–7.45)
PO2 ARTERIAL: 595.7 MMHG (ref 75–100)
POC CALCIUM: 1.22 MMOL/L (ref 1.12–1.32)
POC CHLORIDE: 107 MMOL/L (ref 98–109)
POC CREATININE: 1.7 MG/DL (ref 0.9–1.3)
POTASSIUM SERPL-SCNC: 4 MMOL/L (ref 3.5–4.5)
SODIUM BLD-SCNC: 143 MMOL/L (ref 138–146)
SOURCE, BLOOD GAS: ABNORMAL

## 2023-04-06 PROCEDURE — 1200000000 HC SEMI PRIVATE

## 2023-04-06 PROCEDURE — C1760 CLOSURE DEV, VASC: HCPCS

## 2023-04-06 PROCEDURE — 80051 ELECTROLYTE PANEL: CPT

## 2023-04-06 PROCEDURE — 7100000000 HC PACU RECOVERY - FIRST 15 MIN

## 2023-04-06 PROCEDURE — 3700000000 HC ANESTHESIA ATTENDED CARE

## 2023-04-06 PROCEDURE — 3700000001 HC ADD 15 MINUTES (ANESTHESIA)

## 2023-04-06 PROCEDURE — 86901 BLOOD TYPING SEROLOGIC RH(D): CPT

## 2023-04-06 PROCEDURE — 86850 RBC ANTIBODY SCREEN: CPT

## 2023-04-06 PROCEDURE — C1769 GUIDE WIRE: HCPCS

## 2023-04-06 PROCEDURE — 82803 BLOOD GASES ANY COMBINATION: CPT

## 2023-04-06 PROCEDURE — 2580000003 HC RX 258: Performed by: INTERNAL MEDICINE

## 2023-04-06 PROCEDURE — 2500000003 HC RX 250 WO HCPCS

## 2023-04-06 PROCEDURE — 6360000002 HC RX W HCPCS

## 2023-04-06 PROCEDURE — C1889 IMPLANT/INSERT DEVICE, NOC: HCPCS

## 2023-04-06 PROCEDURE — 33340 PERQ CLSR TCAT L ATR APNDGE: CPT

## 2023-04-06 PROCEDURE — 93312 ECHO TRANSESOPHAGEAL: CPT

## 2023-04-06 PROCEDURE — 2709999900 HC NON-CHARGEABLE SUPPLY

## 2023-04-06 PROCEDURE — 93308 TTE F-UP OR LMTD: CPT

## 2023-04-06 PROCEDURE — 6360000004 HC RX CONTRAST MEDICATION

## 2023-04-06 PROCEDURE — P9016 RBC LEUKOCYTES REDUCED: HCPCS

## 2023-04-06 PROCEDURE — 86900 BLOOD TYPING SEROLOGIC ABO: CPT

## 2023-04-06 PROCEDURE — 86922 COMPATIBILITY TEST ANTIGLOB: CPT

## 2023-04-06 PROCEDURE — 7100000001 HC PACU RECOVERY - ADDTL 15 MIN

## 2023-04-06 PROCEDURE — C1894 INTRO/SHEATH, NON-LASER: HCPCS

## 2023-04-06 PROCEDURE — 82565 ASSAY OF CREATININE: CPT

## 2023-04-06 PROCEDURE — 2720000010 HC SURG SUPPLY STERILE

## 2023-04-06 PROCEDURE — 85014 HEMATOCRIT: CPT

## 2023-04-06 PROCEDURE — 85018 HEMOGLOBIN: CPT

## 2023-04-06 PROCEDURE — 82962 GLUCOSE BLOOD TEST: CPT

## 2023-04-06 PROCEDURE — 2580000003 HC RX 258

## 2023-04-06 RX ORDER — CEFAZOLIN SODIUM 1 G/3ML
INJECTION, POWDER, FOR SOLUTION INTRAMUSCULAR; INTRAVENOUS PRN
Status: DISCONTINUED | OUTPATIENT
Start: 2023-04-06 | End: 2023-04-06 | Stop reason: SDUPTHER

## 2023-04-06 RX ORDER — SODIUM CHLORIDE 0.9 % (FLUSH) 0.9 %
5-40 SYRINGE (ML) INJECTION PRN
Status: DISCONTINUED | OUTPATIENT
Start: 2023-04-06 | End: 2023-04-06 | Stop reason: HOSPADM

## 2023-04-06 RX ORDER — MEPERIDINE HYDROCHLORIDE 25 MG/ML
12.5 INJECTION INTRAMUSCULAR; INTRAVENOUS; SUBCUTANEOUS EVERY 5 MIN PRN
Status: DISCONTINUED | OUTPATIENT
Start: 2023-04-06 | End: 2023-04-06 | Stop reason: HOSPADM

## 2023-04-06 RX ORDER — HEPARIN SODIUM 1000 [USP'U]/ML
INJECTION, SOLUTION INTRAVENOUS; SUBCUTANEOUS PRN
Status: DISCONTINUED | OUTPATIENT
Start: 2023-04-06 | End: 2023-04-06 | Stop reason: SDUPTHER

## 2023-04-06 RX ORDER — LIDOCAINE HYDROCHLORIDE 20 MG/ML
INJECTION, SOLUTION EPIDURAL; INFILTRATION; INTRACAUDAL; PERINEURAL PRN
Status: DISCONTINUED | OUTPATIENT
Start: 2023-04-06 | End: 2023-04-06 | Stop reason: SDUPTHER

## 2023-04-06 RX ORDER — ROCURONIUM BROMIDE 10 MG/ML
INJECTION, SOLUTION INTRAVENOUS PRN
Status: DISCONTINUED | OUTPATIENT
Start: 2023-04-06 | End: 2023-04-06 | Stop reason: SDUPTHER

## 2023-04-06 RX ORDER — FENTANYL CITRATE 50 UG/ML
50 INJECTION, SOLUTION INTRAMUSCULAR; INTRAVENOUS EVERY 5 MIN PRN
Status: DISCONTINUED | OUTPATIENT
Start: 2023-04-06 | End: 2023-04-06 | Stop reason: HOSPADM

## 2023-04-06 RX ORDER — SODIUM CHLORIDE 9 MG/ML
INJECTION, SOLUTION INTRAVENOUS PRN
Status: COMPLETED | OUTPATIENT
Start: 2023-04-06 | End: 2023-04-06

## 2023-04-06 RX ORDER — OXYCODONE HYDROCHLORIDE 5 MG/1
5 TABLET ORAL
OUTPATIENT
Start: 2023-04-06 | End: 2023-04-07

## 2023-04-06 RX ORDER — SODIUM CHLORIDE 9 MG/ML
INJECTION, SOLUTION INTRAVENOUS PRN
Status: DISCONTINUED | OUTPATIENT
Start: 2023-04-06 | End: 2023-04-06 | Stop reason: HOSPADM

## 2023-04-06 RX ORDER — PROPOFOL 10 MG/ML
INJECTION, EMULSION INTRAVENOUS PRN
Status: DISCONTINUED | OUTPATIENT
Start: 2023-04-06 | End: 2023-04-06 | Stop reason: SDUPTHER

## 2023-04-06 RX ORDER — LABETALOL HYDROCHLORIDE 5 MG/ML
10 INJECTION, SOLUTION INTRAVENOUS
Status: DISCONTINUED | OUTPATIENT
Start: 2023-04-06 | End: 2023-04-06 | Stop reason: HOSPADM

## 2023-04-06 RX ORDER — PROTAMINE SULFATE 10 MG/ML
INJECTION, SOLUTION INTRAVENOUS PRN
Status: DISCONTINUED | OUTPATIENT
Start: 2023-04-06 | End: 2023-04-06 | Stop reason: SDUPTHER

## 2023-04-06 RX ORDER — DROPERIDOL 2.5 MG/ML
0.62 INJECTION, SOLUTION INTRAMUSCULAR; INTRAVENOUS
Status: DISCONTINUED | OUTPATIENT
Start: 2023-04-06 | End: 2023-04-06 | Stop reason: HOSPADM

## 2023-04-06 RX ORDER — FENTANYL CITRATE 50 UG/ML
INJECTION, SOLUTION INTRAMUSCULAR; INTRAVENOUS PRN
Status: DISCONTINUED | OUTPATIENT
Start: 2023-04-06 | End: 2023-04-06 | Stop reason: SDUPTHER

## 2023-04-06 RX ORDER — SODIUM CHLORIDE 0.9 % (FLUSH) 0.9 %
5-40 SYRINGE (ML) INJECTION EVERY 12 HOURS SCHEDULED
Status: DISCONTINUED | OUTPATIENT
Start: 2023-04-06 | End: 2023-04-06 | Stop reason: HOSPADM

## 2023-04-06 RX ORDER — ONDANSETRON 2 MG/ML
4 INJECTION INTRAMUSCULAR; INTRAVENOUS
Status: DISCONTINUED | OUTPATIENT
Start: 2023-04-06 | End: 2023-04-06 | Stop reason: HOSPADM

## 2023-04-06 RX ORDER — IPRATROPIUM BROMIDE AND ALBUTEROL SULFATE 2.5; .5 MG/3ML; MG/3ML
1 SOLUTION RESPIRATORY (INHALATION)
Status: DISCONTINUED | OUTPATIENT
Start: 2023-04-06 | End: 2023-04-06 | Stop reason: HOSPADM

## 2023-04-06 RX ORDER — ONDANSETRON 2 MG/ML
INJECTION INTRAMUSCULAR; INTRAVENOUS PRN
Status: DISCONTINUED | OUTPATIENT
Start: 2023-04-06 | End: 2023-04-06 | Stop reason: SDUPTHER

## 2023-04-06 RX ORDER — HYDRALAZINE HYDROCHLORIDE 20 MG/ML
10 INJECTION INTRAMUSCULAR; INTRAVENOUS
Status: DISCONTINUED | OUTPATIENT
Start: 2023-04-06 | End: 2023-04-06 | Stop reason: HOSPADM

## 2023-04-06 RX ADMIN — ROCURONIUM BROMIDE 50 MG: 10 INJECTION INTRAVENOUS at 10:30

## 2023-04-06 RX ADMIN — LIDOCAINE HYDROCHLORIDE 100 MG: 20 INJECTION, SOLUTION EPIDURAL; INFILTRATION; INTRACAUDAL; PERINEURAL at 10:30

## 2023-04-06 RX ADMIN — CEFAZOLIN 2 G: 1 INJECTION, POWDER, FOR SOLUTION INTRAMUSCULAR; INTRAVENOUS; PARENTERAL at 10:37

## 2023-04-06 RX ADMIN — HEPARIN SODIUM 4000 UNITS: 1000 INJECTION INTRAVENOUS; SUBCUTANEOUS at 11:05

## 2023-04-06 RX ADMIN — PHENYLEPHRINE HYDROCHLORIDE 10 MCG/MIN: 10 INJECTION INTRAVENOUS at 10:48

## 2023-04-06 RX ADMIN — HEPARIN SODIUM 2000 UNITS: 1000 INJECTION INTRAVENOUS; SUBCUTANEOUS at 11:18

## 2023-04-06 RX ADMIN — FENTANYL CITRATE 50 MCG: 50 INJECTION, SOLUTION INTRAMUSCULAR; INTRAVENOUS at 10:30

## 2023-04-06 RX ADMIN — HEPARIN SODIUM 4000 UNITS: 1000 INJECTION INTRAVENOUS; SUBCUTANEOUS at 10:53

## 2023-04-06 RX ADMIN — ONDANSETRON 4 MG: 2 INJECTION INTRAMUSCULAR; INTRAVENOUS at 11:35

## 2023-04-06 RX ADMIN — SUGAMMADEX 200 MG: 100 INJECTION, SOLUTION INTRAVENOUS at 11:35

## 2023-04-06 RX ADMIN — PROTAMINE SULFATE 30 MG: 10 INJECTION, SOLUTION INTRAVENOUS at 11:32

## 2023-04-06 RX ADMIN — SODIUM CHLORIDE: 900 INJECTION INTRAVENOUS at 10:23

## 2023-04-06 RX ADMIN — PROPOFOL 120 MG: 10 INJECTION, EMULSION INTRAVENOUS at 10:30

## 2023-04-06 ASSESSMENT — ENCOUNTER SYMPTOMS
CHEST TIGHTNESS: 0
PHOTOPHOBIA: 0
CONSTIPATION: 0
DIARRHEA: 0
ABDOMINAL PAIN: 0
SHORTNESS OF BREATH: 0
VOMITING: 0
EYE PAIN: 0
NAUSEA: 0
COLOR CHANGE: 0
BLOOD IN STOOL: 0
BACK PAIN: 0
WHEEZING: 0
COUGH: 0

## 2023-04-06 NOTE — PROGRESS NOTES
Discharge instructions reviewed with patient and family per ELLE Agosto RN. Voices understanding. Ambulated without difficulty. Right groin site free from any active bleeding, oozing, or hematoma noted.

## 2023-04-06 NOTE — H&P
Electrophysiology H&P  Note      Reason for consultation: Atrial fibrillation    Chief complaint :  here for watchman implantation    Referring physician: Dr. Lyric Harper      Primary care physician: Yu Strickland MD      History of Present Illness: Today visit (04/06/23)    Patient here for watchman implantation. No change in H&P noted from previous clinic visit. Previous visit (3/3/2023)      Chief Complaint   Patient presents with    Atrial Fibrillation     Patient is seen for afib. Patient is here to discuss watchman. Patient denies chest pain, shortness of breath, dizziness, palpitations, and edema. Patient had PAF episode and he was started on Xarelto. Patient then on Xarelto had bleeding in the eye x 2. Then patient was stopped off Xarelto. Patient also has chronic anemia and Dr. Cristiane Toledo has been following the patient. Previous visit: (8/5/2022)    Patient is having extreme fatigue. Patient also reports dizziness but did not pass out. Patient had event monitor and he was told it was abnormal and here for further assessment. Patient denies any chest pain, shortness of breath palpitations. He was recently in the hospital with dizziness and near syncope      Previous visit:     Teena Pimentel is a 78year old male with a history of CAD, HTN, Diabetes type 2, dyslipidemia, and iron deficiency anemia presents with complaints of dizziness and near syncope. Patient reports that yesterday he was at [employer] when while standing he began to feel dizzy. He states that he made his way to a seat and still felt dizzy. He listening to the [employer] service but then asked for prayer because he wasn't feeling well. The patient ambulated to a pew where he laid down until the EMS arrived. Patient states that he doesn't think he passed out completely. He states that he was dizzy, his vision became dark but here could here everything around him.  He states that after he

## 2023-04-06 NOTE — DISCHARGE SUMMARY
MG tablet Take 1 tablet by mouth daily (with breakfast)      Cyanocobalamin (VITAMIN B 12 PO) Take by mouth every other day       aspirin 81 MG tablet Take 1 tablet by mouth daily      atorvastatin (LIPITOR) 10 MG tablet Take 1 tablet by mouth daily             Consults:  none    Discharge Exam:  /68   Pulse 63   Temp 97.4 °F (36.3 °C) (Temporal)   Resp 18   Ht 5' 8.5\" (1.74 m)   Wt 159 lb (72.1 kg)   SpO2 98%   BMI 23.82 kg/m²   General appearance: alert, appears stated age, and cooperative  Head: Normocephalic, without obvious abnormality, atraumatic  Lungs: clear to auscultation bilaterally  Heart: regular rate and rhythm, S1, S2 normal, no murmur, click, rub or gallop  Extremities: extremities normal, atraumatic, no cyanosis or edema  Pulses: 2+ and symmetric  Skin: Skin color, texture, turgor normal. No rashes or lesions. Right femoral groin site is soft nontender no hematoma  Neurologic: Grossly normal    Disposition:   home    It is medically necessary that patients undergoing percutaneous left atrial appendage occlusion are admitted as an inpatient. This patient had a recovery that was earlier than expected and can be discharged today. Patient will follow with Dr. Venessa Tucker in 1-2 weeks and will undergo TTE ar 45 days following WATCHMAN implant. Signed:  ANNABELLA Aguayo CNP  4/6/2023, 1:28 PM      I have seen and examined this patient personally, independently of the nurse practitioner. I have reviewed the hospital care given to date and reviewed all pertinent labs and imaging. The plan was developed mutually at the time of the visit with the patient, Nurse practioner (NP)  and myself. I have spoken with patient, nursing staff and provided written and verbal instructions. The above note has faith reviewed and I agree with the history, physical examination and treatment plan. Necessary changes to the note has been made in history, physical examination and plan to the above note.     Fer

## 2023-04-06 NOTE — PROGRESS NOTES
Lunch tray delivered to the patient. Family at bedside to assist patient in eating. Call light within reach. Bed in the lowest position.

## 2023-04-06 NOTE — OP NOTE
selection of appropriate watchman device. Decision was made to use a size 27 mm Watchman device based on VAL measurement. Both groins were prepped in a sterile fashion. We gained access in Right femoral veins. Right femoral access was obtained using modified using modified seldinger technique. We used Perclose device to pre close the access site and then we upsized the dilator from 8F and 12F AND 16F and a 16 Barbadian sheath was then placed in the right femoral vein. Patient received a bolus of heparin prior transseptal puncture. Transseptal punctures through intact septum were done using VAL guidance as well as pressure monitoring , and fluoroscopy in Swedish and GR projections. (Difficult trans-septal because of the pacemaker leads but we got in good angle for trans-septal for device deployment) We used Kasenna transseptal access system to get across the left atrium. A [employer] wire was placed into the left superior pulmonary vein. Then we used the 12Fdilator over the wire to get access to the left atrium and then we removed the dilated and then we used over the wire with watchman access sheath. Then a pigtail catheter was inserted into the access sheath and was placed inside the left atrial appendage. Angiography of ROSA was performed. Pigtail catheter was removed. Then Watchman delivery sheath was inserted into the access sheath. Using fluoroscopy and live VAL, the device was implanted into the appendage. We noted a good occlusion of left atrial appendage with the device - confirmed by VAL and contrast injection. A Tug test was done multiple times to insure device stability     VAL guidance and 3D guidance revealed device to be well seated did not correct the position. After deployment a tug test was done and stability of device was checked. Position of device was checked. Measurement of device showed appropriate compression of the device. Using color Doppler, no leak around the was noted.  PASS criteria

## 2023-04-06 NOTE — ANESTHESIA PRE PROCEDURE
Department of Anesthesiology  Preprocedure Note       Name:  Frantz Diaz   Age:  78 y.o.  :  1945                                          MRN:  [id number]         Date:  2023      Surgeon: * No surgeons listed *    Procedure: * No procedures listed *    Medications prior to admission:   Prior to Admission medications    Medication Sig Start Date End Date Taking? Authorizing Provider   clopidogrel (PLAVIX) 75 MG tablet Take 1 tablet by mouth daily 3/3/23   Tatianna Blank MD   amiodarone (CORDARONE) 200 MG tablet Take 1 tablet by mouth daily 23  ANNABELLA Austin CNP   metoprolol tartrate (LOPRESSOR) 25 MG tablet Take 1 tablet by mouth 2 times daily 23  ANNABELLA Austin CNP   loperamide (IMODIUM A-D) 2 MG tablet Take 1 tablet by mouth 2 times daily 3/25/22   Historical Provider, MD   Lancets ([address] [place]) [address] [facility]  1/3/22   Historical Provider, MD Agueda Gar strip  1/3/22   Historical Provider, MD   Coenzyme Q10 (COQ-10) 30 MG CAPS Take 30 mg by mouth daily    Historical Provider, MD   fenofibrate (TRICOR) 145 MG tablet Take 1 tablet by mouth daily 8/3/18   Historical Provider, MD   metFORMIN (GLUCOPHAGE-XR) 500 MG extended release tablet Take 1 tablet by mouth 2 times daily (with meals)    Historical Provider, MD   b complex vitamins capsule Take 1 capsule by mouth daily    Historical Provider, MD   ferrous sulfate 325 (65 Fe) MG tablet Take 1 tablet by mouth daily (with breakfast)    Historical Provider, MD   Cyanocobalamin (VITAMIN B 12 PO) Take by mouth every other day     Historical Provider, MD   aspirin 81 MG tablet Take 1 tablet by mouth daily    Historical Provider, MD   atorvastatin (LIPITOR) 10 MG tablet Take 1 tablet by mouth daily    Historical Provider, MD       Current medications:    No current facility-administered medications for this visit. No current outpatient medications on file.      Facility-Administered Medications

## 2023-04-06 NOTE — ANESTHESIA POSTPROCEDURE EVALUATION
Department of Anesthesiology  Postprocedure Note    Patient: Naina Salas  MRN: [id number]  YOB: 1945  Date of evaluation: 4/6/2023      Procedure Summary     Date: 04/06/23 Room / Location: [facility] Cath Lab    Anesthesia Start: 8096 Anesthesia Stop: 8218    Procedures:       PROCEDURE      WATCHMAN Diagnosis:       Unspecified atrial fibrillation      Presence of Watchman left atrial appendage closure device    Scheduled Providers:  Responsible Provider: Lynn Molina MD    Anesthesia Type: General ASA Status: 4          Anesthesia Type: General    Joe Phase I: Joe Score: 10    Joe Phase II:        Anesthesia Post Evaluation    Patient location during evaluation: PACU  Patient participation: complete - patient participated  Level of consciousness: awake and alert  Airway patency: patent  Nausea & Vomiting: no nausea and no vomiting  Complications: no  Cardiovascular status: hemodynamically stable  Respiratory status: face mask and spontaneous ventilation  Hydration status: stable Patient: Ruth Mar  Location: [facility] 3225  MRN: [id number]  Today's date: 6/18/2021    Attempted to see patient for therapy. Unable due to medical hold.   Patient w/ bedrest order in chart & per EMR is for OR today. Will need new PT orders post op.

## 2023-04-06 NOTE — DISCHARGE INSTRUCTIONS
If you have stopped any medicines, check with your provider about when to restart them. Take anticoagulants as prescribed. Do not stop or miss any doses without talking to the Implanting Physician. FOLLOW UP APPOINTMENTS:                 Follow up with Lea Fabian Nurse Practitioner or Flor Denis at [address] [facility] Cardiology 1-2 weeks after your procedure         CALL THE CLINIC IF:                 You have increased pain or a large or growing hard lump around/at the site. The site is red, swollen, hot, or tender. Blood or fluid is draining. You have chills or fever greater than 101 F (38 C). Your leg feels numb, cool, or changes color. You have hives, a rash, or unusual itching. New pain in the back or belly that you cannot control with Tylenol. Any questions or concerns. Part of the FDA approval of the Watchman procedure in 2015 mandated that each procedure must participate in a national registry, this requires several follow up appointments and testing following the procedure. These Include:              7-10 day follow up with the Nurse practitioner or Dr Carmen Degroot             45 day follow up lab work and VAL to check positioning of the device.               6 month follow up telephone call             1 year follow up appointment, VAL and lab work             2 year follow up appointment and lab work          Antibiotic coverage may be needed after the WATCHMAN implant (up to 6 months) with any of the following:              Dental procedures including cleanings             Gastrointestinal (GI) or genitourinary () procedures in patients with ongoing GI or  tract infection             Respiratory procedures involving incision or biopsy             Procedures on infected skin or muscle          PLEASE CALL THE STRUCTURAL HEART Coordinator or [place] [address] Cardiology at [phone] if

## 2023-04-07 ENCOUNTER — TELEPHONE (OUTPATIENT)
Dept: CARDIOLOGY CLINIC | Age: 78
End: 2023-04-07

## 2023-04-07 NOTE — TELEPHONE ENCOUNTER
Post watchman day 1 call done. Patient states he is doing well. .   Groin has no bruising or hematoma present. Patient is taking anticoagulants as prescribed. Denies c/o or needs. All questions answered. Will call me if questions arise.     Electronically signed by Germain Blanco RN on 4/7/2023 at 3:59 PM [address] [facility]

## 2023-04-17 DIAGNOSIS — Z95.818 PRESENCE OF WATCHMAN LEFT ATRIAL APPENDAGE CLOSURE DEVICE: Primary | ICD-10-CM

## 2023-04-18 ENCOUNTER — PROCEDURE VISIT (OUTPATIENT)
Dept: CARDIOLOGY CLINIC | Age: 78
End: 2023-04-18

## 2023-04-18 DIAGNOSIS — I49.5 SINUS NODE DYSFUNCTION (HCC): ICD-10-CM

## 2023-04-18 DIAGNOSIS — Z95.0 CARDIAC PACEMAKER IN SITU: ICD-10-CM

## 2023-04-18 DIAGNOSIS — I44.0 AV BLOCK, 1ST DEGREE: ICD-10-CM

## 2023-05-02 ENCOUNTER — HOSPITAL ENCOUNTER (OUTPATIENT)
Dept: INFUSION THERAPY | Age: 78
Discharge: HOME | End: 2023-05-02
Payer: MEDICARE

## 2023-05-02 DIAGNOSIS — D50.0 IRON DEFICIENCY ANEMIA SECONDARY TO BLOOD LOSS (CHRONIC): ICD-10-CM

## 2023-05-02 LAB
BASOPHILS ABSOLUTE: 0 K/CU MM
BASOPHILS RELATIVE PERCENT: 0.7 % (ref 0–1)
DIFFERENTIAL TYPE: ABNORMAL
EOSINOPHILS ABSOLUTE: 0.2 K/CU MM
EOSINOPHILS RELATIVE PERCENT: 2.5 % (ref 0–3)
FERRITIN: 110 NG/ML (ref 30–400)
HCT VFR BLD CALC: 31.8 % (ref 42–52)
HEMOGLOBIN: 9.7 GM/DL (ref 13.5–18)
IRON: 90 UG/DL (ref 59–158)
LYMPHOCYTES ABSOLUTE: 1.3 K/CU MM
LYMPHOCYTES RELATIVE PERCENT: 21.1 % (ref 24–44)
MCH RBC QN AUTO: 30 PG (ref 27–31)
MCHC RBC AUTO-ENTMCNC: 30.5 % (ref 32–36)
MCV RBC AUTO: 98.5 FL (ref 78–100)
MONOCYTES ABSOLUTE: 0.5 K/CU MM
MONOCYTES RELATIVE PERCENT: 8.1 % (ref 0–4)
PCT TRANSFERRIN: 25 % (ref 10–44)
PDW BLD-RTO: 17.3 % (ref 11.7–14.9)
PLATELET # BLD: 215 K/CU MM (ref 140–440)
PMV BLD AUTO: 9.3 FL (ref 7.5–11.1)
RBC # BLD: 3.23 M/CU MM (ref 4.6–6.2)
SEGMENTED NEUTROPHILS ABSOLUTE COUNT: 4.1 K/CU MM
SEGMENTED NEUTROPHILS RELATIVE PERCENT: 67.6 % (ref 36–66)
TOTAL IRON BINDING CAPACITY: 355 UG/DL (ref 250–450)
UNSATURATED IRON BINDING CAPACITY: 265 UG/DL (ref 110–370)
WBC # BLD: 6.1 K/CU MM (ref 4–10.5)

## 2023-05-02 PROCEDURE — 36415 COLL VENOUS BLD VENIPUNCTURE: CPT

## 2023-05-02 PROCEDURE — 83550 IRON BINDING TEST: CPT

## 2023-05-02 PROCEDURE — 82728 ASSAY OF FERRITIN: CPT

## 2023-05-02 PROCEDURE — 83540 ASSAY OF IRON: CPT

## 2023-05-02 PROCEDURE — 85025 COMPLETE CBC W/AUTO DIFF WBC: CPT

## 2023-05-09 ENCOUNTER — HOSPITAL ENCOUNTER (OUTPATIENT)
Dept: INFUSION THERAPY | Age: 78
Discharge: HOME | End: 2023-05-09
Payer: MEDICARE

## 2023-05-09 ENCOUNTER — OFFICE VISIT (OUTPATIENT)
Dept: ONCOLOGY | Age: 78
End: 2023-05-09
Payer: MEDICARE

## 2023-05-09 VITALS
HEART RATE: 62 BPM | RESPIRATION RATE: 16 BRPM | DIASTOLIC BLOOD PRESSURE: 58 MMHG | OXYGEN SATURATION: 98 % | BODY MASS INDEX: 24.34 KG/M2 | SYSTOLIC BLOOD PRESSURE: 123 MMHG | WEIGHT: 160.6 LBS | HEIGHT: 68 IN | TEMPERATURE: 96.4 F

## 2023-05-09 DIAGNOSIS — D50.0 IRON DEFICIENCY ANEMIA SECONDARY TO BLOOD LOSS (CHRONIC): Primary | ICD-10-CM

## 2023-05-09 PROCEDURE — 99213 OFFICE O/P EST LOW 20 MIN: CPT | Performed by: INTERNAL MEDICINE

## 2023-05-09 PROCEDURE — 3017F COLORECTAL CA SCREEN DOC REV: CPT | Performed by: INTERNAL MEDICINE

## 2023-05-09 PROCEDURE — G8420 CALC BMI NORM PARAMETERS: HCPCS | Performed by: INTERNAL MEDICINE

## 2023-05-09 PROCEDURE — G8427 DOCREV CUR MEDS BY ELIG CLIN: HCPCS | Performed by: INTERNAL MEDICINE

## 2023-05-09 PROCEDURE — 1036F TOBACCO NON-USER: CPT | Performed by: INTERNAL MEDICINE

## 2023-05-09 PROCEDURE — 3074F SYST BP LT 130 MM HG: CPT | Performed by: INTERNAL MEDICINE

## 2023-05-09 PROCEDURE — 3078F DIAST BP <80 MM HG: CPT | Performed by: INTERNAL MEDICINE

## 2023-05-09 PROCEDURE — 1124F ACP DISCUSS-NO DSCNMKR DOCD: CPT | Performed by: INTERNAL MEDICINE

## 2023-05-09 PROCEDURE — 99211 OFF/OP EST MAY X REQ PHY/QHP: CPT

## 2023-05-09 NOTE — PROGRESS NOTES
MA Rooming Questions  Patient: Dion Query  MRN: [id number]    Date: 5/9/2023        1. Do you have any new issues?   no         2. Do you need any refills on medications?    no    3. Have you had any imaging done since your last visit?   no    4. Have you been hospitalized or seen in the emergency room since your last visit here?   no    5. Did the patient have a depression screening completed today?  No    No data recorded     PHQ-9 Given to (if applicable):               PHQ-9 Score (if applicable):                     [] Positive     []  Negative              Does question #9 need addressed (if applicable)                     [] Yes    []  No               Brain New MA

## 2023-05-18 ENCOUNTER — NURSE ONLY (OUTPATIENT)
Dept: CARDIOLOGY CLINIC | Age: 78
End: 2023-05-18

## 2023-05-18 NOTE — PROGRESS NOTES
Patient here in office and educated on VAL , schedule for 5/22/2023 @ 1100, with arrival @ 1000, @ [facility]; risk explained; and consents signed. Hold Metformin the day before, the day of and two days after procedure. Patient voiced understanding. Copies of consent & info scanned in chart.

## 2023-05-22 ENCOUNTER — HOSPITAL ENCOUNTER (OUTPATIENT)
Dept: NON INVASIVE DIAGNOSTICS | Age: 78
Discharge: HOME | End: 2023-05-22
Payer: MEDICARE

## 2023-05-22 ENCOUNTER — ANESTHESIA (OUTPATIENT)
Dept: NON INVASIVE DIAGNOSTICS | Age: 78
End: 2023-05-22

## 2023-05-22 ENCOUNTER — ANESTHESIA EVENT (OUTPATIENT)
Dept: NON INVASIVE DIAGNOSTICS | Age: 78
End: 2023-05-22

## 2023-05-22 ENCOUNTER — HOSPITAL ENCOUNTER (OUTPATIENT)
Age: 78
Discharge: HOME | End: 2023-05-22
Payer: MEDICARE

## 2023-05-22 VITALS
SYSTOLIC BLOOD PRESSURE: 122 MMHG | HEART RATE: 60 BPM | RESPIRATION RATE: 11 BRPM | OXYGEN SATURATION: 98 % | DIASTOLIC BLOOD PRESSURE: 63 MMHG

## 2023-05-22 LAB
ANION GAP SERPL CALCULATED.3IONS-SCNC: 9 MMOL/L (ref 4–16)
APTT: 32.7 SECONDS (ref 25.1–37.1)
BUN SERPL-MCNC: 24 MG/DL (ref 6–23)
CALCIUM SERPL-MCNC: 8.8 MG/DL (ref 8.3–10.6)
CHLORIDE BLD-SCNC: 108 MMOL/L (ref 99–110)
CO2: 27 MMOL/L (ref 21–32)
CREAT SERPL-MCNC: 1.6 MG/DL (ref 0.9–1.3)
GFR SERPL CREATININE-BSD FRML MDRD: 45 ML/MIN/1.73M2
GLUCOSE SERPL-MCNC: 124 MG/DL (ref 70–99)
HCT VFR BLD CALC: 30.6 % (ref 42–52)
HEMOGLOBIN: 9.7 GM/DL (ref 13.5–18)
INR BLD: 1.03 INDEX
LV EF: 58 %
LVEF MODALITY: NORMAL
MAGNESIUM: 1.9 MG/DL (ref 1.8–2.4)
MCH RBC QN AUTO: 30.8 PG (ref 27–31)
MCHC RBC AUTO-ENTMCNC: 31.7 % (ref 32–36)
MCV RBC AUTO: 97.1 FL (ref 78–100)
PDW BLD-RTO: 15.3 % (ref 11.7–14.9)
PHOSPHORUS: 3.1 MG/DL (ref 2.5–4.9)
PLATELET # BLD: 238 K/CU MM (ref 140–440)
PMV BLD AUTO: 9.7 FL (ref 7.5–11.1)
POTASSIUM SERPL-SCNC: 4.4 MMOL/L (ref 3.5–5.1)
PROTHROMBIN TIME: 13.1 SECONDS (ref 11.7–14.5)
RBC # BLD: 3.15 M/CU MM (ref 4.6–6.2)
SODIUM BLD-SCNC: 144 MMOL/L (ref 135–145)
WBC # BLD: 4.2 K/CU MM (ref 4–10.5)

## 2023-05-22 PROCEDURE — 83735 ASSAY OF MAGNESIUM: CPT

## 2023-05-22 PROCEDURE — 7100000000 HC PACU RECOVERY - FIRST 15 MIN

## 2023-05-22 PROCEDURE — 80048 BASIC METABOLIC PNL TOTAL CA: CPT

## 2023-05-22 PROCEDURE — 93312 ECHO TRANSESOPHAGEAL: CPT

## 2023-05-22 PROCEDURE — 85610 PROTHROMBIN TIME: CPT

## 2023-05-22 PROCEDURE — 2500000003 HC RX 250 WO HCPCS: Performed by: NURSE ANESTHETIST, CERTIFIED REGISTERED

## 2023-05-22 PROCEDURE — 93325 DOPPLER ECHO COLOR FLOW MAPG: CPT | Performed by: INTERNAL MEDICINE

## 2023-05-22 PROCEDURE — 85027 COMPLETE CBC AUTOMATED: CPT

## 2023-05-22 PROCEDURE — 93312 ECHO TRANSESOPHAGEAL: CPT | Performed by: INTERNAL MEDICINE

## 2023-05-22 PROCEDURE — 85730 THROMBOPLASTIN TIME PARTIAL: CPT

## 2023-05-22 PROCEDURE — 3700000000 HC ANESTHESIA ATTENDED CARE

## 2023-05-22 PROCEDURE — 6360000002 HC RX W HCPCS

## 2023-05-22 PROCEDURE — 84100 ASSAY OF PHOSPHORUS: CPT

## 2023-05-22 PROCEDURE — 6360000002 HC RX W HCPCS: Performed by: NURSE ANESTHETIST, CERTIFIED REGISTERED

## 2023-05-22 PROCEDURE — 7100000001 HC PACU RECOVERY - ADDTL 15 MIN

## 2023-05-22 RX ORDER — LIDOCAINE HYDROCHLORIDE 20 MG/ML
INJECTION, SOLUTION EPIDURAL; INFILTRATION; INTRACAUDAL; PERINEURAL PRN
Status: DISCONTINUED | OUTPATIENT
Start: 2023-05-22 | End: 2023-05-22 | Stop reason: SDUPTHER

## 2023-05-22 RX ORDER — PROPOFOL 10 MG/ML
INJECTION, EMULSION INTRAVENOUS PRN
Status: DISCONTINUED | OUTPATIENT
Start: 2023-05-22 | End: 2023-05-22 | Stop reason: SDUPTHER

## 2023-05-22 RX ADMIN — PROPOFOL 100 MG: 10 INJECTION, EMULSION INTRAVENOUS at 11:23

## 2023-05-22 RX ADMIN — LIDOCAINE HYDROCHLORIDE 100 MG: 20 INJECTION, SOLUTION EPIDURAL; INFILTRATION; INTRACAUDAL; PERINEURAL at 11:17

## 2023-05-22 ASSESSMENT — ENCOUNTER SYMPTOMS
SINUS PRESSURE: 0
EYE ITCHING: 0
DIARRHEA: 0
ABDOMINAL DISTENTION: 0
ABDOMINAL PAIN: 0
BLOOD IN STOOL: 0
COUGH: 0
SINUS PAIN: 0
CHEST TIGHTNESS: 0
VOMITING: 0
WHEEZING: 0
EYE PAIN: 0
NAUSEA: 0
BACK PAIN: 0
COLOR CHANGE: 0
PHOTOPHOBIA: 0
CONSTIPATION: 0
SHORTNESS OF BREATH: 0

## 2023-05-22 NOTE — PROGRESS NOTES
Discharge instructions given with voiced understanding. Iv out. To follow up with [facility]. discharged to home.

## 2023-05-22 NOTE — ANESTHESIA PRE PROCEDURE
Department of Anesthesiology  Preprocedure Note       Name:  Tabatha Richardson   Age:  78 y.o.  :  1945                                          MRN:  [id number]         Date:  2023      Surgeon: * Surgery not found *    Procedure:     Medications prior to admission:   Prior to Admission medications    Medication Sig Start Date End Date Taking?  Authorizing Provider   clopidogrel (PLAVIX) 75 MG tablet Take 1 tablet by mouth daily 3/3/23   Zack Stephens MD   amiodarone (CORDARONE) 200 MG tablet Take 1 tablet by mouth daily 23  Brandi Precise, APRN - CNP   metoprolol tartrate (LOPRESSOR) 25 MG tablet Take 1 tablet by mouth 2 times daily 23  Brandi Precise, APRN - CNP   loperamide (IMODIUM A-D) 2 MG tablet Take 1 tablet by mouth as needed 3/25/22   Historical Provider, MD   Lancets ([address] [place]) [address] [facility]  1/3/22   Historical Provider, MD Sameera Lara strip  1/3/22   Historical Provider, MD   Coenzyme Q10 (COQ-10) 30 MG CAPS Take 30 mg by mouth daily    Historical Provider, MD   fenofibrate (TRICOR) 145 MG tablet Take 1 tablet by mouth daily 8/3/18   Historical Provider, MD   metFORMIN (GLUCOPHAGE-XR) 500 MG extended release tablet Take 1 tablet by mouth 2 times daily (with meals)    Historical Provider, MD   b complex vitamins capsule Take 1 capsule by mouth daily    Historical Provider, MD   ferrous sulfate 325 (65 Fe) MG tablet Take 1 tablet by mouth daily (with breakfast)    Historical Provider, MD   Cyanocobalamin (VITAMIN B 12 PO) Take by mouth every other day     Historical Provider, MD   aspirin 81 MG tablet Take 1 tablet by mouth daily    Historical Provider, MD   atorvastatin (LIPITOR) 10 MG tablet Take 1 tablet by mouth daily    Historical Provider, MD       Current medications:    Current Outpatient Medications   Medication Sig Dispense Refill    clopidogrel (PLAVIX) 75 MG tablet Take 1 tablet by mouth daily 30 tablet 3    amiodarone (CORDARONE) 200

## 2023-05-22 NOTE — H&P
Electrophysiology Follow up Note      Reason for consultation: Atrial fibrillation    Chief complaint: Post watchman 45 day PERRY    Referring physician: Dr. Leydi Boss      Primary care physician: Laureen Avitia MD      History of Present Illness: Today visit (05/22/23)    Patient here for post watchman 45 DAYS perry. No change in H&P noted from previous clinic visit. Previous visit 4/12/2023  Patient here today for 1 week follow-up status post implantation of Watchman device. Patient reports he is feeling well. Patient denies chest pain, palpitations, shortness of breath, lightheadedness, dizziness, edema or syncope. He reports that the right femoral groin site has healed well. Previous visit (3/3/2023)      Chief Complaint   Patient presents with    Atrial Fibrillation     Patient is seen for afib. Patient is here to discuss watchman. Patient denies chest pain, shortness of breath, dizziness, palpitations, and edema. Patient had PAF episode and he was started on Xarelto. Patient then on Xarelto had bleeding in the eye x 2. Then patient was stopped off Xarelto. Patient also has chronic anemia and Dr. Semaj Marshall has been following the patient. Previous visit: (8/5/2022)    Patient is having extreme fatigue. Patient also reports dizziness but did not pass out. Patient had event monitor and he was told it was abnormal and here for further assessment. Patient denies any chest pain, shortness of breath palpitations. He was recently in the hospital with dizziness and near syncope      Previous visit:     Nav Palmer is a 78year old male with a history of CAD, HTN, Diabetes type 2, dyslipidemia, and iron deficiency anemia presents with complaints of dizziness and near syncope. Patient reports that yesterday he was at [employer] when while standing he began to feel dizzy. He states that he made his way to a seat and still felt dizzy.  He listening to the

## 2023-05-22 NOTE — ANESTHESIA POSTPROCEDURE EVALUATION
Department of Anesthesiology  Postprocedure Note    Patient: Tom Danielle  MRN: [id number]  YOB: 1945  Date of evaluation: 5/22/2023      Procedure Summary     Date: 05/22/23 Room / Location: [facility] Stress Lab    Anesthesia Start: 1117 Anesthesia Stop: 8199    Procedure: TRANSESOPHAGEAL ECHOCARDIOGRAM Diagnosis: Unspecified atrial fibrillation    Scheduled Providers:  Responsible Provider: Norma Ortiz MD    Anesthesia Type: MAC ASA Status: 4          Anesthesia Type: No value filed.     Joe Phase I: Joe Score: 10    Joe Phase II:        Anesthesia Post Evaluation    Patient location during evaluation: bedside  Patient participation: complete - patient participated  Level of consciousness: awake and alert  Pain score: 0  Airway patency: patent  Nausea & Vomiting: no nausea and no vomiting  Complications: no  Cardiovascular status: blood pressure returned to baseline  Respiratory status: acceptable  Hydration status: euvolemic

## 2023-06-09 ENCOUNTER — OFFICE VISIT (OUTPATIENT)
Dept: CARDIOLOGY CLINIC | Age: 78
End: 2023-06-09

## 2023-06-09 VITALS
DIASTOLIC BLOOD PRESSURE: 62 MMHG | BODY MASS INDEX: 23.79 KG/M2 | SYSTOLIC BLOOD PRESSURE: 122 MMHG | HEIGHT: 69 IN | HEART RATE: 66 BPM | WEIGHT: 160.6 LBS

## 2023-06-09 DIAGNOSIS — I48.0 PAF (PAROXYSMAL ATRIAL FIBRILLATION) (HCC): Primary | ICD-10-CM

## 2023-06-09 RX ORDER — AMIODARONE HYDROCHLORIDE 200 MG/1
200 TABLET ORAL DAILY
Qty: 180 TABLET | Refills: 1 | Status: SHIPPED | OUTPATIENT
Start: 2023-06-09

## 2023-06-09 RX ORDER — CLOPIDOGREL BISULFATE 75 MG/1
75 TABLET ORAL DAILY
Qty: 90 TABLET | Refills: 1 | Status: SHIPPED | OUTPATIENT
Start: 2023-06-09

## 2023-06-09 ASSESSMENT — ENCOUNTER SYMPTOMS
COLOR CHANGE: 0
ABDOMINAL PAIN: 0
BACK PAIN: 0
BLOOD IN STOOL: 0
SHORTNESS OF BREATH: 0
COUGH: 0
VOMITING: 0
WHEEZING: 0
CHEST TIGHTNESS: 0
NAUSEA: 0
DIARRHEA: 0
EYE PAIN: 0
PHOTOPHOBIA: 0
CONSTIPATION: 0

## 2023-06-09 NOTE — PROGRESS NOTES
arthralgias. Negative for back pain, myalgias and neck stiffness. Skin:  Negative for color change and rash. Allergic/Immunologic: Negative for food allergies. Neurological:  Negative for dizziness, light-headedness, numbness and headaches. Hematological:  Does not bruise/bleed easily. Psychiatric/Behavioral:  Negative for agitation, behavioral problems and confusion. Examination:      Vitals:    06/09/23 0927   BP: 122/62   Site: Left Upper Arm   Position: Sitting   Cuff Size: Medium Adult   Pulse: 66   Weight: 160 lb 9.6 oz (72.8 kg)   Height: 5' 8.5\" (1.74 m)        Body mass index is 24.06 kg/m². Physical Exam  Constitutional:       General: He is not in acute distress. Appearance: He is not ill-appearing or diaphoretic. HENT:      Head: Normocephalic and atraumatic. Right Ear: External ear normal.      Left Ear: External ear normal.      Nose: No congestion. Mouth/Throat:      Mouth: Mucous membranes are moist.   Eyes:      Extraocular Movements: Extraocular movements intact. Conjunctiva/sclera: Conjunctivae normal.      Pupils: Pupils are equal, round, and reactive to light. Cardiovascular:      Rate and Rhythm: Normal rate and regular rhythm. Heart sounds: No murmur heard. Pulmonary:      Effort: Pulmonary effort is normal. No respiratory distress. Breath sounds: Normal breath sounds. No wheezing or rhonchi. Abdominal:      General: Abdomen is flat. Bowel sounds are normal. There is no distension. Palpations: Abdomen is soft. Tenderness: There is no abdominal tenderness. Musculoskeletal:         General: No tenderness. Cervical back: Normal range of motion. No tenderness. Right lower leg: No edema. Left lower leg: No edema. Skin:     General: Skin is warm. Neurological:      General: No focal deficit present. Mental Status: He is alert and oriented to person, place, and time.       Cranial Nerves: No cranial nerve

## 2023-06-29 RX ORDER — CLOPIDOGREL BISULFATE 75 MG/1
TABLET ORAL
Qty: 30 TABLET | Refills: 0 | Status: SHIPPED | OUTPATIENT
Start: 2023-06-29

## 2023-07-25 ENCOUNTER — PROCEDURE VISIT (OUTPATIENT)
Dept: CARDIOLOGY CLINIC | Age: 78
End: 2023-07-25

## 2023-07-25 DIAGNOSIS — Z95.0 CARDIAC PACEMAKER IN SITU: Primary | ICD-10-CM

## 2023-07-25 DIAGNOSIS — I44.0 AV BLOCK, 1ST DEGREE: ICD-10-CM

## 2023-07-25 DIAGNOSIS — I49.5 SINUS NODE DYSFUNCTION (HCC): ICD-10-CM

## 2023-08-18 ENCOUNTER — HOSPITAL ENCOUNTER (OUTPATIENT)
Age: 78
Discharge: HOME | End: 2023-08-18
Payer: MEDICARE

## 2023-08-18 DIAGNOSIS — E11.9 TYPE 2 DIABETES MELLITUS WITHOUT COMPLICATION, WITHOUT LONG-TERM CURRENT USE OF INSULIN (HCC): ICD-10-CM

## 2023-08-18 LAB
ALBUMIN SERPL-MCNC: 4.3 GM/DL (ref 3.4–5)
ALP BLD-CCNC: 64 IU/L (ref 40–129)
ALT SERPL-CCNC: 19 U/L (ref 10–40)
ANION GAP SERPL CALCULATED.3IONS-SCNC: 8 MMOL/L (ref 4–16)
AST SERPL-CCNC: 24 IU/L (ref 15–37)
BILIRUB SERPL-MCNC: 0.4 MG/DL (ref 0–1)
BILIRUBIN URINE: NEGATIVE MG/DL
BLOOD, URINE: NEGATIVE
BUN SERPL-MCNC: 23 MG/DL (ref 6–23)
CALCIUM SERPL-MCNC: 9 MG/DL (ref 8.3–10.6)
CHLORIDE BLD-SCNC: 107 MMOL/L (ref 99–110)
CLARITY: CLEAR
CO2: 27 MMOL/L (ref 21–32)
COLOR: YELLOW
COMMENT UA: NORMAL
CREAT SERPL-MCNC: 1.6 MG/DL (ref 0.9–1.3)
CREATININE URINE: 105.4 MG/DL (ref 39–259)
GFR SERPL CREATININE-BSD FRML MDRD: 44 ML/MIN/1.73M2
GLUCOSE SERPL-MCNC: 122 MG/DL (ref 70–99)
GLUCOSE, URINE: NEGATIVE MG/DL
KETONES, URINE: NEGATIVE MG/DL
LEUKOCYTE ESTERASE, URINE: NEGATIVE
MAGNESIUM: 2 MG/DL (ref 1.8–2.4)
NITRITE URINE, QUANTITATIVE: NEGATIVE
PH, URINE: 6 (ref 5–8)
PHOSPHORUS: 3.2 MG/DL (ref 2.5–4.9)
POTASSIUM SERPL-SCNC: 4.3 MMOL/L (ref 3.5–5.1)
PROT/CREAT RATIO, UR: 0.2
PROTEIN UA: NEGATIVE MG/DL
SODIUM BLD-SCNC: 142 MMOL/L (ref 135–145)
SPECIFIC GRAVITY UA: 1.02 (ref 1–1.03)
TOTAL PROTEIN: 6.3 GM/DL (ref 6.4–8.2)
URINE TOTAL PROTEIN: 19.9 MG/DL
UROBILINOGEN, URINE: 0.2 MG/DL (ref 0.2–1)

## 2023-08-18 PROCEDURE — 82570 ASSAY OF URINE CREATININE: CPT

## 2023-08-18 PROCEDURE — 36415 COLL VENOUS BLD VENIPUNCTURE: CPT

## 2023-08-18 PROCEDURE — 83735 ASSAY OF MAGNESIUM: CPT

## 2023-08-18 PROCEDURE — 84156 ASSAY OF PROTEIN URINE: CPT

## 2023-08-18 PROCEDURE — 80053 COMPREHEN METABOLIC PANEL: CPT

## 2023-08-18 PROCEDURE — 84100 ASSAY OF PHOSPHORUS: CPT

## 2023-08-18 PROCEDURE — 81003 URINALYSIS AUTO W/O SCOPE: CPT

## 2023-08-22 NOTE — PROGRESS NOTES
Patient Name: Marline Pete  Patient : 1945  Patient MRN: [id number]     Primary Oncologist: Sarah Heredia MD  Referring Provider: Melany Marion MD     Date of Service: 2023      Chief Complaint:   Chief Complaint   Patient presents with    Follow-up     He came in for follow-up visit. Patient Active Problem List:     Edema     CAD (coronary artery disease)     Diabetes mellitus      Right inguinal hernia     Anemia, unspecified     Iron deficiency anemia secondary to blood loss (chronic)     Anemia of chronic disorder     Dyslipidemia     Essential hypertension     HPI:   Reina Barragan is a pleasant 77-year-old male patient whom I have been following for chronic anemia since 2009. He had GI workup, including colonoscopy, EGD, capsule endoscopic study in  by Dr. Chang Newman. He was found to have diverticulosis. Bone marrow study in 2009 showed trilineage hematopoietic marrow, which appeared normocellular for his age. Iron stains were adequate. FISH for MDS was negative. Flow cytometry showed no monoclonality. He was found to have mild leukopenia. He had surgery on his right toe in 2013 and was hospitalized in 2013 with cellulitis to right lower extremity. He was treated with antibiotic. MRI of the right lower extremity at the time showed findings compatible with cellulitis. Blood test in 2014 showed white cell count of 5.1, hemoglobin 11.3, platelet 949, iron was 96, TIBC 369, transferrin saturation 26, ferritin 105. He had stool guaiac by Dr. Dominique Cunha in May 2014. He had umbilical hernia surgery in 2014 by Dr. Janelle Haas. Blood test in 2014 showed white cell count 4.8, hemoglobin 11.4, platelet 246. Iron was 95, ferritin 102, transferrin saturation 31. Thyroid functions were within normal limits. He had colonoscopy in March or 2015. Reportedly, he could have slight inflammation to his colon.   He had his

## 2023-08-24 PROBLEM — N18.32 STAGE 3B CHRONIC KIDNEY DISEASE (HCC): Status: ACTIVE | Noted: 2023-08-24

## 2023-08-29 ENCOUNTER — HOSPITAL ENCOUNTER (OUTPATIENT)
Dept: ULTRASOUND IMAGING | Age: 78
Discharge: HOME | End: 2023-08-29
Attending: INTERNAL MEDICINE
Payer: MEDICARE

## 2023-08-29 DIAGNOSIS — Z95.818 PRESENCE OF WATCHMAN LEFT ATRIAL APPENDAGE CLOSURE DEVICE: ICD-10-CM

## 2023-08-29 DIAGNOSIS — N18.32 STAGE 3B CHRONIC KIDNEY DISEASE (HCC): ICD-10-CM

## 2023-08-29 DIAGNOSIS — Z95.0 S/P PLACEMENT OF CARDIAC PACEMAKER: ICD-10-CM

## 2023-08-29 DIAGNOSIS — D50.0 IRON DEFICIENCY ANEMIA SECONDARY TO BLOOD LOSS (CHRONIC): ICD-10-CM

## 2023-08-29 DIAGNOSIS — I10 ESSENTIAL HYPERTENSION: ICD-10-CM

## 2023-08-29 DIAGNOSIS — E11.9 TYPE 2 DIABETES MELLITUS WITHOUT COMPLICATION, WITHOUT LONG-TERM CURRENT USE OF INSULIN (HCC): ICD-10-CM

## 2023-08-29 PROCEDURE — 76775 US EXAM ABDO BACK WALL LIM: CPT

## 2023-09-12 ENCOUNTER — HOSPITAL ENCOUNTER (OUTPATIENT)
Dept: INFUSION THERAPY | Age: 78
Discharge: HOME | End: 2023-09-12
Payer: MEDICARE

## 2023-09-12 DIAGNOSIS — D64.9 ANEMIA, UNSPECIFIED TYPE: ICD-10-CM

## 2023-09-12 LAB
BASOPHILS ABSOLUTE: 0 K/CU MM
BASOPHILS RELATIVE PERCENT: 0.6 % (ref 0–1)
DIFFERENTIAL TYPE: ABNORMAL
EOSINOPHILS ABSOLUTE: 0.1 K/CU MM
EOSINOPHILS RELATIVE PERCENT: 2.7 % (ref 0–3)
FERRITIN: 108 NG/ML (ref 30–400)
HCT VFR BLD CALC: 33.8 % (ref 42–52)
HEMOGLOBIN: 10.5 GM/DL (ref 13.5–18)
IRON: 72 UG/DL (ref 59–158)
LYMPHOCYTES ABSOLUTE: 1.2 K/CU MM
LYMPHOCYTES RELATIVE PERCENT: 22.3 % (ref 24–44)
MCH RBC QN AUTO: 29.7 PG (ref 27–31)
MCHC RBC AUTO-ENTMCNC: 31.1 % (ref 32–36)
MCV RBC AUTO: 95.8 FL (ref 78–100)
MONOCYTES ABSOLUTE: 0.5 K/CU MM
MONOCYTES RELATIVE PERCENT: 10 % (ref 0–4)
PCT TRANSFERRIN: 22 % (ref 10–44)
PDW BLD-RTO: 15.4 % (ref 11.7–14.9)
PLATELET # BLD: 203 K/CU MM (ref 140–440)
PMV BLD AUTO: 9.9 FL (ref 7.5–11.1)
RBC # BLD: 3.53 M/CU MM (ref 4.6–6.2)
SEGMENTED NEUTROPHILS ABSOLUTE COUNT: 3.4 K/CU MM
SEGMENTED NEUTROPHILS RELATIVE PERCENT: 64.4 % (ref 36–66)
TOTAL IRON BINDING CAPACITY: 324 UG/DL (ref 250–450)
UNSATURATED IRON BINDING CAPACITY: 252 UG/DL (ref 110–370)
WBC # BLD: 5.3 K/CU MM (ref 4–10.5)

## 2023-09-12 PROCEDURE — 85025 COMPLETE CBC W/AUTO DIFF WBC: CPT

## 2023-09-12 PROCEDURE — 36415 COLL VENOUS BLD VENIPUNCTURE: CPT

## 2023-09-12 PROCEDURE — 82728 ASSAY OF FERRITIN: CPT

## 2023-09-12 PROCEDURE — 83550 IRON BINDING TEST: CPT

## 2023-09-12 PROCEDURE — 83540 ASSAY OF IRON: CPT

## 2023-09-19 ENCOUNTER — HOSPITAL ENCOUNTER (OUTPATIENT)
Dept: INFUSION THERAPY | Age: 78
Discharge: HOME | End: 2023-09-19
Payer: MEDICARE

## 2023-09-19 ENCOUNTER — OFFICE VISIT (OUTPATIENT)
Dept: ONCOLOGY | Age: 78
End: 2023-09-19
Payer: MEDICARE

## 2023-09-19 VITALS
DIASTOLIC BLOOD PRESSURE: 72 MMHG | BODY MASS INDEX: 22.87 KG/M2 | HEIGHT: 69 IN | WEIGHT: 154.4 LBS | TEMPERATURE: 97.8 F | RESPIRATION RATE: 16 BRPM | SYSTOLIC BLOOD PRESSURE: 160 MMHG | HEART RATE: 60 BPM | OXYGEN SATURATION: 99 %

## 2023-09-19 DIAGNOSIS — D64.9 ANEMIA, UNSPECIFIED TYPE: Primary | ICD-10-CM

## 2023-09-19 PROCEDURE — 1036F TOBACCO NON-USER: CPT | Performed by: INTERNAL MEDICINE

## 2023-09-19 PROCEDURE — 1124F ACP DISCUSS-NO DSCNMKR DOCD: CPT | Performed by: INTERNAL MEDICINE

## 2023-09-19 PROCEDURE — 99211 OFF/OP EST MAY X REQ PHY/QHP: CPT

## 2023-09-19 PROCEDURE — 3077F SYST BP >= 140 MM HG: CPT | Performed by: INTERNAL MEDICINE

## 2023-09-19 PROCEDURE — 3078F DIAST BP <80 MM HG: CPT | Performed by: INTERNAL MEDICINE

## 2023-09-19 PROCEDURE — G8420 CALC BMI NORM PARAMETERS: HCPCS | Performed by: INTERNAL MEDICINE

## 2023-09-19 PROCEDURE — 99213 OFFICE O/P EST LOW 20 MIN: CPT | Performed by: INTERNAL MEDICINE

## 2023-09-19 PROCEDURE — G8427 DOCREV CUR MEDS BY ELIG CLIN: HCPCS | Performed by: INTERNAL MEDICINE

## 2023-09-19 NOTE — PROGRESS NOTES
MA Rooming Questions  Patient: Sylvia Washington  MRN: [id number]    Date: 9/19/2023        1. Do you have any new issues?   no         2. Do you need any refills on medications?    no    3. Have you had any imaging done since your last visit? yes - US    4. Have you been hospitalized or seen in the emergency room since your last visit here?   no    5. Did the patient have a depression screening completed today?  No    No data recorded     PHQ-9 Given to (if applicable):               PHQ-9 Score (if applicable):                     [] Positive     []  Negative              Does question #9 need addressed (if applicable)                     [] Yes    []  No               Isatu Kauffman CMA

## 2023-09-28 ENCOUNTER — OFFICE VISIT (OUTPATIENT)
Dept: CARDIOLOGY CLINIC | Age: 78
End: 2023-09-28
Payer: MEDICARE

## 2023-09-28 ENCOUNTER — HOSPITAL ENCOUNTER (OUTPATIENT)
Age: 78
Discharge: HOME | End: 2023-09-28
Payer: MEDICARE

## 2023-09-28 VITALS
HEIGHT: 69 IN | SYSTOLIC BLOOD PRESSURE: 118 MMHG | WEIGHT: 153.6 LBS | BODY MASS INDEX: 22.75 KG/M2 | HEART RATE: 60 BPM | DIASTOLIC BLOOD PRESSURE: 78 MMHG

## 2023-09-28 DIAGNOSIS — Z95.0 CARDIAC PACEMAKER IN SITU: ICD-10-CM

## 2023-09-28 DIAGNOSIS — Z95.0 S/P PLACEMENT OF CARDIAC PACEMAKER: ICD-10-CM

## 2023-09-28 DIAGNOSIS — I48.0 HYPERCOAGULABLE STATE DUE TO PAROXYSMAL ATRIAL FIBRILLATION (HCC): ICD-10-CM

## 2023-09-28 DIAGNOSIS — I49.5 SINUS NODE DYSFUNCTION (HCC): ICD-10-CM

## 2023-09-28 DIAGNOSIS — Z79.899 ON AMIODARONE THERAPY: Primary | ICD-10-CM

## 2023-09-28 DIAGNOSIS — Z79.899 ON AMIODARONE THERAPY: ICD-10-CM

## 2023-09-28 DIAGNOSIS — Z95.818 PRESENCE OF WATCHMAN LEFT ATRIAL APPENDAGE CLOSURE DEVICE: ICD-10-CM

## 2023-09-28 DIAGNOSIS — I44.0 AV BLOCK, 1ST DEGREE: ICD-10-CM

## 2023-09-28 DIAGNOSIS — I10 ESSENTIAL HYPERTENSION: ICD-10-CM

## 2023-09-28 DIAGNOSIS — I48.0 PAF (PAROXYSMAL ATRIAL FIBRILLATION) (HCC): ICD-10-CM

## 2023-09-28 DIAGNOSIS — D68.69 HYPERCOAGULABLE STATE DUE TO PAROXYSMAL ATRIAL FIBRILLATION (HCC): ICD-10-CM

## 2023-09-28 LAB
ALBUMIN SERPL-MCNC: 4.4 GM/DL (ref 3.4–5)
ALP BLD-CCNC: 67 IU/L (ref 40–129)
ALT SERPL-CCNC: 20 U/L (ref 10–40)
AST SERPL-CCNC: 24 IU/L (ref 15–37)
BILIRUB SERPL-MCNC: 0.3 MG/DL (ref 0–1)
BILIRUBIN DIRECT: 0.2 MG/DL (ref 0–0.3)
BILIRUBIN, INDIRECT: 0.1 MG/DL (ref 0–0.7)
T4 FREE SERPL-MCNC: 1.27 NG/DL (ref 0.9–1.8)
TOTAL PROTEIN: 7.1 GM/DL (ref 6.4–8.2)
TSH SERPL DL<=0.005 MIU/L-ACNC: 2.83 UIU/ML (ref 0.27–4.2)

## 2023-09-28 PROCEDURE — 99214 OFFICE O/P EST MOD 30 MIN: CPT | Performed by: INTERNAL MEDICINE

## 2023-09-28 PROCEDURE — 84439 ASSAY OF FREE THYROXINE: CPT

## 2023-09-28 PROCEDURE — 1124F ACP DISCUSS-NO DSCNMKR DOCD: CPT | Performed by: INTERNAL MEDICINE

## 2023-09-28 PROCEDURE — 1036F TOBACCO NON-USER: CPT | Performed by: INTERNAL MEDICINE

## 2023-09-28 PROCEDURE — G8420 CALC BMI NORM PARAMETERS: HCPCS | Performed by: INTERNAL MEDICINE

## 2023-09-28 PROCEDURE — 36415 COLL VENOUS BLD VENIPUNCTURE: CPT

## 2023-09-28 PROCEDURE — 3074F SYST BP LT 130 MM HG: CPT | Performed by: INTERNAL MEDICINE

## 2023-09-28 PROCEDURE — G8427 DOCREV CUR MEDS BY ELIG CLIN: HCPCS | Performed by: INTERNAL MEDICINE

## 2023-09-28 PROCEDURE — 3078F DIAST BP <80 MM HG: CPT | Performed by: INTERNAL MEDICINE

## 2023-09-28 PROCEDURE — 84443 ASSAY THYROID STIM HORMONE: CPT

## 2023-09-28 PROCEDURE — 80076 HEPATIC FUNCTION PANEL: CPT

## 2023-09-28 NOTE — PROGRESS NOTES
Jose Amaya MD        OFFICE  FOLLOWUP NOTE    Chief complaints: patient is here for management of  PAF, PPM ( SICK SINUS SYNDROME), CAD, DM, HTN, H/O SYNCOPE, RETINAL HEMORRHAGE  Subjective: patient feels better, no chest pain, no shortness of breath, no dizziness, no palpitations    HPI Parrish James is a 70 y. o.year old who  has a past medical history of Abdominal Doppler, Acute MI ([address]), Arthritis, CAD (coronary artery disease), Diabetes mellitus ([address]), Essential hypertension, H/O cardiovascular stress test, H/O echocardiogram, and H/O echocardiogram. and presents for management of  PAF, PPM ( SICK SINUS SYNDROME), CAD, DM, HTN, H/O SYNCOPE, RETINAL HEMORRHAGE which are well controlled      Current Outpatient Medications   Medication Sig Dispense Refill    b complex vitamins capsule Take 1 capsule by mouth daily With Vitamin C      ammonium lactate (LAC-HYDRIN) 12 % lotion Apply topically 2 times daily      metoprolol tartrate (LOPRESSOR) 25 MG tablet TAKE 1 TABLET TWICE A  tablet 3    clopidogrel (PLAVIX) 75 MG tablet take 1 tablet by mouth once daily 30 tablet 0    amiodarone (CORDARONE) 200 MG tablet Take 1 tablet by mouth daily 180 tablet 1    loperamide (IMODIUM A-D) 2 MG tablet Take 1 tablet by mouth as needed      Lancets (ONETOUCH DELICA PLUS [id number]) MISC       ONETOUCH VERIO strip       fenofibrate (TRICOR) 145 MG tablet Take 1 tablet by mouth daily      ferrous sulfate 325 (65 Fe) MG tablet Take 1 tablet by mouth daily (with breakfast)      Cyanocobalamin (VITAMIN B 12 PO) Take 500 mg by mouth every other day      aspirin 81 MG tablet Take 1 tablet by mouth daily      atorvastatin (LIPITOR) 10 MG tablet Take 1 tablet by mouth daily       No current facility-administered medications for this visit. Allergies: Patient has no known allergies.   Past Medical History:   Diagnosis Date    Abdominal Doppler 07/06/2021    Normal study    Acute MI ([address])     1993 - angioplasty, no

## 2023-10-02 DIAGNOSIS — Z79.899 ON AMIODARONE THERAPY: Primary | ICD-10-CM

## 2023-10-03 ENCOUNTER — TELEPHONE (OUTPATIENT)
Dept: CARDIOLOGY CLINIC | Age: 78
End: 2023-10-03

## 2023-10-03 NOTE — TELEPHONE ENCOUNTER
Called and left message with time/day of PFT  Tuesday November 21st  Arriving at [facility] at 12:30 pm

## 2023-10-13 ENCOUNTER — OFFICE VISIT (OUTPATIENT)
Dept: CARDIOLOGY CLINIC | Age: 78
End: 2023-10-13

## 2023-10-13 VITALS
BODY MASS INDEX: 22.96 KG/M2 | HEART RATE: 65 BPM | WEIGHT: 155 LBS | HEIGHT: 69 IN | OXYGEN SATURATION: 99 % | SYSTOLIC BLOOD PRESSURE: 126 MMHG | DIASTOLIC BLOOD PRESSURE: 58 MMHG

## 2023-10-13 DIAGNOSIS — Z95.818 PRESENCE OF WATCHMAN LEFT ATRIAL APPENDAGE CLOSURE DEVICE: Primary | ICD-10-CM

## 2023-10-13 DIAGNOSIS — Z79.899 ON AMIODARONE THERAPY: ICD-10-CM

## 2023-10-13 DIAGNOSIS — I10 ESSENTIAL HYPERTENSION: ICD-10-CM

## 2023-10-13 DIAGNOSIS — I48.0 PAF (PAROXYSMAL ATRIAL FIBRILLATION) (HCC): ICD-10-CM

## 2023-10-13 ASSESSMENT — ENCOUNTER SYMPTOMS
COLOR CHANGE: 0
ABDOMINAL DISTENTION: 0
SHORTNESS OF BREATH: 0
SINUS PRESSURE: 0
COUGH: 0
ABDOMINAL PAIN: 0
PHOTOPHOBIA: 0
SINUS PAIN: 0

## 2023-10-13 NOTE — PATIENT INSTRUCTIONS
Please be informed that if you contact our office outside of normal business hours the physician on call cannot help with any scheduling or rescheduling issues, procedure instruction questions or any type of medication issue. We advise you for any urgent/emergency that you go to the nearest emergency room! PLEASE CALL OUR OFFICE DURING NORMAL BUSINESS HOURS    Monday - Friday   8 am to 5 pm    [place]: [address] [place]: [phone]    [place]:  [phone]    **It is YOUR responsibilty to bring medication bottles and/or updated medication list to [facility]. This will allow us to better serve you and all your healthcare needs**    Thank you for allowing us to care for you today! We want to ensure we can follow your treatment plan and we strive to give you the best outcomes and experience possible. If you ever have a life threatening emergency and call 911 - for an ambulance (EMS)   Our providers can only care for you at:   [facility] or [facility]. Even if you have someone take you or you drive yourself we can only care for you in a [address] [place] [address] facility. Our providers are not setup at the other healthcare locations! We are committed to providing you the best care possible. If you receive a survey after visiting one of our offices, please take time to share your experience concerning your physician office visit. These surveys are confidential and no health information about you is shared. We are eager to improve for you and we are counting on your feedback to help make that happen.

## 2023-11-01 ENCOUNTER — TELEPHONE (OUTPATIENT)
Dept: CARDIOLOGY CLINIC | Age: 78
End: 2023-11-01

## 2023-11-01 ENCOUNTER — PROCEDURE VISIT (OUTPATIENT)
Dept: CARDIOLOGY CLINIC | Age: 78
End: 2023-11-01

## 2023-11-01 DIAGNOSIS — I44.0 AV BLOCK, 1ST DEGREE: ICD-10-CM

## 2023-11-01 DIAGNOSIS — I49.5 SINUS NODE DYSFUNCTION (HCC): ICD-10-CM

## 2023-11-01 DIAGNOSIS — Z95.0 CARDIAC PACEMAKER IN SITU: Primary | ICD-10-CM

## 2023-11-08 ENCOUNTER — HOSPITAL ENCOUNTER (OUTPATIENT)
Age: 78
Discharge: HOME | End: 2023-11-08
Payer: MEDICARE

## 2023-11-08 DIAGNOSIS — D50.0 IRON DEFICIENCY ANEMIA SECONDARY TO BLOOD LOSS (CHRONIC): ICD-10-CM

## 2023-11-08 DIAGNOSIS — N18.32 STAGE 3B CHRONIC KIDNEY DISEASE (HCC): ICD-10-CM

## 2023-11-08 DIAGNOSIS — I10 ESSENTIAL HYPERTENSION: ICD-10-CM

## 2023-11-08 DIAGNOSIS — Z95.0 S/P PLACEMENT OF CARDIAC PACEMAKER: ICD-10-CM

## 2023-11-08 DIAGNOSIS — E11.9 TYPE 2 DIABETES MELLITUS WITHOUT COMPLICATION, WITHOUT LONG-TERM CURRENT USE OF INSULIN (HCC): ICD-10-CM

## 2023-11-08 DIAGNOSIS — Z95.818 PRESENCE OF WATCHMAN LEFT ATRIAL APPENDAGE CLOSURE DEVICE: ICD-10-CM

## 2023-11-08 LAB
ALBUMIN SERPL-MCNC: 4.2 GM/DL (ref 3.4–5)
ANION GAP SERPL CALCULATED.3IONS-SCNC: 9 MMOL/L (ref 4–16)
BASOPHILS ABSOLUTE: 0 K/CU MM
BASOPHILS RELATIVE PERCENT: 0.6 % (ref 0–1)
BILIRUBIN URINE: NEGATIVE MG/DL
BLOOD, URINE: NEGATIVE
BUN SERPL-MCNC: 28 MG/DL (ref 6–23)
CALCIUM SERPL-MCNC: 9.5 MG/DL (ref 8.3–10.6)
CHLORIDE BLD-SCNC: 104 MMOL/L (ref 99–110)
CLARITY: CLEAR
CO2: 28 MMOL/L (ref 21–32)
COLOR: YELLOW
COMMENT UA: NORMAL
CREAT SERPL-MCNC: 1.6 MG/DL (ref 0.9–1.3)
CREATININE URINE: 69.3 MG/DL (ref 39–259)
DIFFERENTIAL TYPE: ABNORMAL
EOSINOPHILS ABSOLUTE: 0.1 K/CU MM
EOSINOPHILS RELATIVE PERCENT: 3 % (ref 0–3)
ESTIMATED AVERAGE GLUCOSE: 160 MG/DL
GFR SERPL CREATININE-BSD FRML MDRD: 44 ML/MIN/1.73M2
GLUCOSE SERPL-MCNC: 147 MG/DL (ref 70–99)
GLUCOSE, URINE: NEGATIVE MG/DL
HBA1C MFR BLD: 7.2 % (ref 4.2–6.3)
HCT VFR BLD CALC: 37.1 % (ref 42–52)
HEMOGLOBIN: 11.5 GM/DL (ref 13.5–18)
IMMATURE NEUTROPHIL %: 0 % (ref 0–0.43)
KETONES, URINE: NEGATIVE MG/DL
LEUKOCYTE ESTERASE, URINE: NEGATIVE
LYMPHOCYTES ABSOLUTE: 1.1 K/CU MM
LYMPHOCYTES RELATIVE PERCENT: 22.5 % (ref 24–44)
MAGNESIUM: 2 MG/DL (ref 1.8–2.4)
MCH RBC QN AUTO: 30.3 PG (ref 27–31)
MCHC RBC AUTO-ENTMCNC: 31 % (ref 32–36)
MCV RBC AUTO: 97.6 FL (ref 78–100)
MONOCYTES ABSOLUTE: 0.4 K/CU MM
MONOCYTES RELATIVE PERCENT: 8.8 % (ref 0–4)
NITRITE URINE, QUANTITATIVE: NEGATIVE
NUCLEATED RBC %: 0 %
PDW BLD-RTO: 14.6 % (ref 11.7–14.9)
PH, URINE: 7 (ref 5–8)
PHOSPHORUS: 3.6 MG/DL (ref 2.5–4.9)
PLATELET # BLD: 226 K/CU MM (ref 140–440)
PMV BLD AUTO: 10.6 FL (ref 7.5–11.1)
POTASSIUM SERPL-SCNC: 4.6 MMOL/L (ref 3.5–5.1)
PROT/CREAT RATIO, UR: 0.1
PROTEIN UA: NEGATIVE MG/DL
RBC # BLD: 3.8 M/CU MM (ref 4.6–6.2)
SEGMENTED NEUTROPHILS ABSOLUTE COUNT: 3 K/CU MM
SEGMENTED NEUTROPHILS RELATIVE PERCENT: 65.1 % (ref 36–66)
SODIUM BLD-SCNC: 141 MMOL/L (ref 135–145)
SPECIFIC GRAVITY UA: 1.01 (ref 1–1.03)
T4 FREE SERPL-MCNC: 1.42 NG/DL (ref 0.9–1.8)
TOTAL IMMATURE NEUTOROPHIL: 0 K/CU MM
TOTAL NUCLEATED RBC: 0 K/CU MM
TSH SERPL DL<=0.005 MIU/L-ACNC: 4.13 UIU/ML (ref 0.27–4.2)
URINE TOTAL PROTEIN: 7.4 MG/DL
UROBILINOGEN, URINE: 0.2 MG/DL (ref 0.2–1)
WBC # BLD: 4.7 K/CU MM (ref 4–10.5)

## 2023-11-08 PROCEDURE — 80048 BASIC METABOLIC PNL TOTAL CA: CPT

## 2023-11-08 PROCEDURE — 82040 ASSAY OF SERUM ALBUMIN: CPT

## 2023-11-08 PROCEDURE — 84100 ASSAY OF PHOSPHORUS: CPT

## 2023-11-08 PROCEDURE — 36415 COLL VENOUS BLD VENIPUNCTURE: CPT

## 2023-11-08 PROCEDURE — 84155 ASSAY OF PROTEIN SERUM: CPT

## 2023-11-08 PROCEDURE — 83735 ASSAY OF MAGNESIUM: CPT

## 2023-11-08 PROCEDURE — 84165 PROTEIN E-PHORESIS SERUM: CPT

## 2023-11-08 PROCEDURE — 85025 COMPLETE CBC W/AUTO DIFF WBC: CPT

## 2023-11-08 PROCEDURE — 84156 ASSAY OF PROTEIN URINE: CPT

## 2023-11-08 PROCEDURE — 84439 ASSAY OF FREE THYROXINE: CPT

## 2023-11-08 PROCEDURE — 81003 URINALYSIS AUTO W/O SCOPE: CPT

## 2023-11-08 PROCEDURE — 83036 HEMOGLOBIN GLYCOSYLATED A1C: CPT

## 2023-11-08 PROCEDURE — 86160 COMPLEMENT ANTIGEN: CPT

## 2023-11-08 PROCEDURE — 84443 ASSAY THYROID STIM HORMONE: CPT

## 2023-11-08 PROCEDURE — 82570 ASSAY OF URINE CREATININE: CPT

## 2023-11-09 LAB
ALBUMIN SERPL ELPH-MCNC: 3.8 GM/DL (ref 3.2–5.6)
ALPHA-1-GLOBULIN: 0.2 GM/DL (ref 0.1–0.4)
ALPHA-2-GLOBULIN: 0.7 GM/DL (ref 0.4–1.2)
BETA GLOBULIN: 1 GM/DL (ref 0.5–1.3)
GAMMA GLOBULIN: 1 GM/DL (ref 0.5–1.6)
TOTAL PROTEIN: 6.8 GM/DL (ref 6.4–8.2)

## 2023-11-10 LAB
C3 SERPL-MCNC: 82 MG/DL (ref 90–180)
C4 SERPL-MCNC: 21 MG/DL (ref 10–40)

## 2023-11-17 LAB
ALBUMIN SERPL ELPH-MCNC: 3.8 GM/DL (ref 3.2–5.6)
ALPHA-1-GLOBULIN: 0.2 GM/DL (ref 0.1–0.4)
ALPHA-2-GLOBULIN: 0.7 GM/DL (ref 0.4–1.2)
BETA GLOBULIN: 1 GM/DL (ref 0.5–1.3)
GAMMA GLOBULIN: 1 GM/DL (ref 0.5–1.6)
SPEP INTERPRETATION: NORMAL
SPEP INTERPRETATION: NORMAL
TOTAL PROTEIN: 6.8 GM/DL (ref 6.4–8.2)

## 2023-11-21 ENCOUNTER — HOSPITAL ENCOUNTER (OUTPATIENT)
Dept: PULMONOLOGY | Age: 78
Discharge: HOME | End: 2023-11-21
Attending: INTERNAL MEDICINE
Payer: MEDICARE

## 2023-11-21 DIAGNOSIS — Z79.899 ON AMIODARONE THERAPY: ICD-10-CM

## 2023-11-21 LAB
DLCO %PRED: 78 %
DLCO PRED: NORMAL
DLCO/VA %PRED: NORMAL
DLCO/VA PRED: NORMAL
DLCO/VA: NORMAL
DLCO: NORMAL
EXPIRATORY TIME-POST: NORMAL
EXPIRATORY TIME: NORMAL
FEF 25-75% %CHNG: NORMAL
FEF 25-75% %PRED-POST: NORMAL
FEF 25-75% %PRED-PRE: NORMAL
FEF 25-75% PRED: NORMAL
FEF 25-75%-POST: NORMAL
FEF 25-75%-PRE: NORMAL
FEV1 %PRED-POST: NORMAL
FEV1 %PRED-PRE: NORMAL
FEV1 PRED: NORMAL
FEV1-POST: NORMAL
FEV1-PRE: NORMAL
FEV1/FVC %PRED-POST: NORMAL
FEV1/FVC %PRED-PRE: NORMAL
FEV1/FVC PRED: NORMAL
FEV1/FVC-POST: NORMAL
FEV1/FVC-PRE: NORMAL
FVC %PRED-POST: NORMAL
FVC %PRED-PRE: NORMAL
FVC PRED: NORMAL
FVC-POST: NORMAL
FVC-PRE: NORMAL
GAW %PRED: NORMAL
GAW PRED: NORMAL
GAW: NORMAL
IC %PRED: NORMAL
IC PRED: NORMAL
IC: NORMAL
MEP: NORMAL
MIP: NORMAL
MVV %PRED-PRE: NORMAL
MVV PRED: NORMAL
MVV-PRE: NORMAL
PEF %PRED-POST: NORMAL
PEF %PRED-PRE: NORMAL
PEF PRED: NORMAL
PEF%CHNG: NORMAL
PEF-POST: NORMAL
PEF-PRE: NORMAL
RAW %PRED: NORMAL
RAW PRED: NORMAL
RAW: NORMAL
RV %PRED: NORMAL
RV PRED: NORMAL
RV: NORMAL
SVC %PRED: NORMAL
SVC PRED: NORMAL
SVC: NORMAL
TLC %PRED: NORMAL
TLC PRED: NORMAL
TLC: NORMAL
VA %PRED: NORMAL
VA PRED: NORMAL
VA: NORMAL
VTG %PRED: NORMAL
VTG PRED: NORMAL
VTG: NORMAL

## 2023-11-21 PROCEDURE — 94729 DIFFUSING CAPACITY: CPT

## 2024-02-08 ENCOUNTER — PROCEDURE VISIT (OUTPATIENT)
Dept: CARDIOLOGY CLINIC | Age: 79
End: 2024-02-08

## 2024-02-08 DIAGNOSIS — Z95.0 CARDIAC PACEMAKER IN SITU: Primary | ICD-10-CM

## 2024-02-08 DIAGNOSIS — I44.0 AV BLOCK, 1ST DEGREE: ICD-10-CM

## 2024-02-08 DIAGNOSIS — I49.5 SINUS NODE DYSFUNCTION (HCC): ICD-10-CM

## 2024-02-21 NOTE — PROGRESS NOTES
Patient Name: Cricket Ng  Patient : 1945  Patient MRN: [id number]     Primary Oncologist: Jaxson Rondon MD  Referring Provider: Sunil Kelley MD     Date of Service: 3/21/2024      Chief Complaint:   Chief Complaint   Patient presents with    Follow-up     He came in for follow-up visit.     Patient Active Problem List:     Edema     CAD (coronary artery disease)     Diabetes mellitus      Right inguinal hernia     Anemia, unspecified     Iron deficiency anemia secondary to blood loss (chronic)     Anemia of chronic disorder     Dyslipidemia     Essential hypertension     HPI:   Cricket Ng is a pleasant 78-year-old male patient whom I have been following for chronic anemia since 2009.  He had GI workup, including colonoscopy, EGD, capsule endoscopic study in  by Dr. Earlene Lara.  He was found to have diverticulosis.     Bone marrow study in 2009 showed trilineage hematopoietic marrow, which appeared normocellular for his age.  Iron stains were adequate. FISH for MDS was negative.  Flow cytometry showed no monoclonality.  He was found to have mild leukopenia.      He had surgery on his right toe in 2013 and was hospitalized in 2013 with cellulitis to right lower extremity.  He was treated with antibiotic.  MRI of the right lower extremity at the time showed findings compatible with cellulitis.    Blood test in 2014 showed white cell count of 5.1, hemoglobin 11.3, platelet 231, iron was 96, TIBC 369, transferrin saturation 26, ferritin 105.  He had stool guaiac by Dr. Kelley in May 2014.  He had umbilical hernia surgery in 2014 by Dr. Morelos.   Blood test in 2014 showed white cell count 4.8, hemoglobin 11.4, platelet 235.  Iron was 95, ferritin 102, transferrin saturation 31.  Thyroid functions were within normal limits.    He had colonoscopy in March or 2015.  Reportedly, he could have slight inflammation to his colon.  He had his

## 2024-03-06 ENCOUNTER — HOSPITAL ENCOUNTER (OUTPATIENT)
Age: 79
Discharge: HOME | End: 2024-03-06
Payer: MEDICARE

## 2024-03-06 DIAGNOSIS — Z95.0 S/P PLACEMENT OF CARDIAC PACEMAKER: ICD-10-CM

## 2024-03-06 DIAGNOSIS — I10 ESSENTIAL HYPERTENSION: ICD-10-CM

## 2024-03-06 DIAGNOSIS — E11.9 TYPE 2 DIABETES MELLITUS WITHOUT COMPLICATION, WITHOUT LONG-TERM CURRENT USE OF INSULIN (HCC): ICD-10-CM

## 2024-03-06 DIAGNOSIS — N18.32 STAGE 3B CHRONIC KIDNEY DISEASE (HCC): ICD-10-CM

## 2024-03-06 LAB
ALBUMIN SERPL-MCNC: 4.4 GM/DL (ref 3.4–5)
ANION GAP SERPL CALCULATED.3IONS-SCNC: 10 MMOL/L (ref 7–16)
BASOPHILS ABSOLUTE: 0 K/CU MM
BASOPHILS RELATIVE PERCENT: 0.7 % (ref 0–1)
BILIRUBIN URINE: NEGATIVE MG/DL
BLOOD, URINE: NEGATIVE
BUN SERPL-MCNC: 22 MG/DL (ref 6–23)
CALCIUM SERPL-MCNC: 9.8 MG/DL (ref 8.3–10.6)
CHLORIDE BLD-SCNC: 104 MMOL/L (ref 99–110)
CLARITY: CLEAR
CO2: 29 MMOL/L (ref 21–32)
COLOR: YELLOW
COMMENT UA: ABNORMAL
CREAT SERPL-MCNC: 1.6 MG/DL (ref 0.9–1.3)
CREATININE URINE: 29.2 MG/DL (ref 39–259)
DIFFERENTIAL TYPE: ABNORMAL
EOSINOPHILS ABSOLUTE: 0.1 K/CU MM
EOSINOPHILS RELATIVE PERCENT: 1.8 % (ref 0–3)
GFR SERPL CREATININE-BSD FRML MDRD: 44 ML/MIN/1.73M2
GLUCOSE SERPL-MCNC: 120 MG/DL (ref 70–99)
GLUCOSE, URINE: >1000 MG/DL
HCT VFR BLD CALC: 39.3 % (ref 42–52)
HEMOGLOBIN: 11.8 GM/DL (ref 13.5–18)
IMMATURE NEUTROPHIL %: 0.2 % (ref 0–0.43)
KETONES, URINE: NEGATIVE MG/DL
LEUKOCYTE ESTERASE, URINE: NEGATIVE
LYMPHOCYTES ABSOLUTE: 1.4 K/CU MM
LYMPHOCYTES RELATIVE PERCENT: 25.5 % (ref 24–44)
MAGNESIUM: 2.2 MG/DL (ref 1.8–2.4)
MCH RBC QN AUTO: 29.6 PG (ref 27–31)
MCHC RBC AUTO-ENTMCNC: 30 % (ref 32–36)
MCV RBC AUTO: 98.7 FL (ref 78–100)
MONOCYTES ABSOLUTE: 0.5 K/CU MM
MONOCYTES RELATIVE PERCENT: 8.9 % (ref 0–4)
NITRITE URINE, QUANTITATIVE: NEGATIVE
NUCLEATED RBC %: 0 %
PDW BLD-RTO: 13.4 % (ref 11.7–14.9)
PH, URINE: 7 (ref 5–8)
PHOSPHORUS: 4.1 MG/DL (ref 2.5–4.9)
PLATELET # BLD: 232 K/CU MM (ref 140–440)
PMV BLD AUTO: 10.5 FL (ref 7.5–11.1)
POTASSIUM SERPL-SCNC: 4.6 MMOL/L (ref 3.5–5.1)
PROT/CREAT RATIO, UR: 0.2
PROTEIN UA: NEGATIVE MG/DL
RBC # BLD: 3.98 M/CU MM (ref 4.6–6.2)
SEGMENTED NEUTROPHILS ABSOLUTE COUNT: 3.5 K/CU MM
SEGMENTED NEUTROPHILS RELATIVE PERCENT: 62.9 % (ref 36–66)
SODIUM BLD-SCNC: 143 MMOL/L (ref 135–145)
SPECIFIC GRAVITY UA: 1.01 (ref 1–1.03)
TOTAL IMMATURE NEUTOROPHIL: 0.01 K/CU MM
TOTAL NUCLEATED RBC: 0 K/CU MM
URINE TOTAL PROTEIN: 4.4 MG/DL
UROBILINOGEN, URINE: 0.2 MG/DL (ref 0.2–1)
WBC # BLD: 5.5 K/CU MM (ref 4–10.5)

## 2024-03-06 PROCEDURE — 80048 BASIC METABOLIC PNL TOTAL CA: CPT

## 2024-03-06 PROCEDURE — 84100 ASSAY OF PHOSPHORUS: CPT

## 2024-03-06 PROCEDURE — 81003 URINALYSIS AUTO W/O SCOPE: CPT

## 2024-03-06 PROCEDURE — 84156 ASSAY OF PROTEIN URINE: CPT

## 2024-03-06 PROCEDURE — 82570 ASSAY OF URINE CREATININE: CPT

## 2024-03-06 PROCEDURE — 85025 COMPLETE CBC W/AUTO DIFF WBC: CPT

## 2024-03-06 PROCEDURE — 82040 ASSAY OF SERUM ALBUMIN: CPT

## 2024-03-06 PROCEDURE — 83735 ASSAY OF MAGNESIUM: CPT

## 2024-03-06 PROCEDURE — 36415 COLL VENOUS BLD VENIPUNCTURE: CPT

## 2024-03-14 ENCOUNTER — HOSPITAL ENCOUNTER (OUTPATIENT)
Dept: INFUSION THERAPY | Age: 79
Discharge: HOME | End: 2024-03-14
Payer: MEDICARE

## 2024-03-14 DIAGNOSIS — D64.9 ANEMIA, UNSPECIFIED TYPE: ICD-10-CM

## 2024-03-14 DIAGNOSIS — D50.0 IRON DEFICIENCY ANEMIA SECONDARY TO BLOOD LOSS (CHRONIC): ICD-10-CM

## 2024-03-14 LAB
BASOPHILS ABSOLUTE: 0 K/CU MM
BASOPHILS RELATIVE PERCENT: 0.5 % (ref 0–1)
DIFFERENTIAL TYPE: ABNORMAL
EOSINOPHILS ABSOLUTE: 0.1 K/CU MM
EOSINOPHILS RELATIVE PERCENT: 1.8 % (ref 0–3)
FERRITIN: 90 NG/ML (ref 30–400)
HCT VFR BLD CALC: 36.8 % (ref 42–52)
HEMOGLOBIN: 11.7 GM/DL (ref 13.5–18)
IRON: 87 UG/DL (ref 59–158)
LYMPHOCYTES ABSOLUTE: 1.4 K/CU MM
LYMPHOCYTES RELATIVE PERCENT: 22.9 % (ref 24–44)
MCH RBC QN AUTO: 30.7 PG (ref 27–31)
MCHC RBC AUTO-ENTMCNC: 31.8 % (ref 32–36)
MCV RBC AUTO: 96.6 FL (ref 78–100)
MONOCYTES ABSOLUTE: 0.5 K/CU MM
MONOCYTES RELATIVE PERCENT: 8.3 % (ref 0–4)
PCT TRANSFERRIN: 26 % (ref 10–44)
PDW BLD-RTO: 13.9 % (ref 11.7–14.9)
PLATELET # BLD: 214 K/CU MM (ref 140–440)
PMV BLD AUTO: 9.2 FL (ref 7.5–11.1)
RBC # BLD: 3.81 M/CU MM (ref 4.6–6.2)
SEGMENTED NEUTROPHILS ABSOLUTE COUNT: 4.1 K/CU MM
SEGMENTED NEUTROPHILS RELATIVE PERCENT: 66.5 % (ref 36–66)
TOTAL IRON BINDING CAPACITY: 341 UG/DL (ref 250–450)
UNSATURATED IRON BINDING CAPACITY: 254 UG/DL (ref 110–370)
WBC # BLD: 6.1 K/CU MM (ref 4–10.5)

## 2024-03-14 PROCEDURE — 36415 COLL VENOUS BLD VENIPUNCTURE: CPT

## 2024-03-14 PROCEDURE — 83540 ASSAY OF IRON: CPT

## 2024-03-14 PROCEDURE — 82728 ASSAY OF FERRITIN: CPT

## 2024-03-14 PROCEDURE — 85025 COMPLETE CBC W/AUTO DIFF WBC: CPT

## 2024-03-14 PROCEDURE — 83550 IRON BINDING TEST: CPT

## 2024-03-21 ENCOUNTER — HOSPITAL ENCOUNTER (OUTPATIENT)
Dept: INFUSION THERAPY | Age: 79
Discharge: HOME | End: 2024-03-21
Payer: MEDICARE

## 2024-03-21 ENCOUNTER — OFFICE VISIT (OUTPATIENT)
Dept: ONCOLOGY | Age: 79
End: 2024-03-21

## 2024-03-21 VITALS
DIASTOLIC BLOOD PRESSURE: 95 MMHG | SYSTOLIC BLOOD PRESSURE: 129 MMHG | HEIGHT: 68 IN | TEMPERATURE: 97.7 F | OXYGEN SATURATION: 100 % | HEART RATE: 60 BPM | BODY MASS INDEX: 23.98 KG/M2 | WEIGHT: 158.2 LBS | RESPIRATION RATE: 16 BRPM

## 2024-03-21 DIAGNOSIS — D64.9 ANEMIA, UNSPECIFIED TYPE: Primary | ICD-10-CM

## 2024-03-21 PROCEDURE — 99211 OFF/OP EST MAY X REQ PHY/QHP: CPT

## 2024-03-21 ASSESSMENT — PATIENT HEALTH QUESTIONNAIRE - PHQ9
SUM OF ALL RESPONSES TO PHQ QUESTIONS 1-9: 0
1. LITTLE INTEREST OR PLEASURE IN DOING THINGS: NOT AT ALL
SUM OF ALL RESPONSES TO PHQ9 QUESTIONS 1 & 2: 0
2. FEELING DOWN, DEPRESSED OR HOPELESS: NOT AT ALL
SUM OF ALL RESPONSES TO PHQ QUESTIONS 1-9: 0

## 2024-03-21 NOTE — PROGRESS NOTES
MA Rooming Questions  Patient: Cricket Ng  MRN: [id number]    Date: 3/21/2024        1. Do you have any new issues?   no         2. Do you need any refills on medications?    no    3. Have you had any imaging done since your last visit?   no    4. Have you been hospitalized or seen in the emergency room since your last visit here?   no    5. Did the patient have a depression screening completed today? No    No data recorded     PHQ-9 Given to (if applicable):               PHQ-9 Score (if applicable):                     [] Positive     []  Negative              Does question #9 need addressed (if applicable)                     [] Yes    []  No               Coni Harding CMA

## 2024-03-28 ENCOUNTER — OFFICE VISIT (OUTPATIENT)
Dept: CARDIOLOGY CLINIC | Age: 79
End: 2024-03-28
Payer: MEDICARE

## 2024-03-28 VITALS
HEIGHT: 68 IN | DIASTOLIC BLOOD PRESSURE: 72 MMHG | HEART RATE: 67 BPM | SYSTOLIC BLOOD PRESSURE: 122 MMHG | BODY MASS INDEX: 24.46 KG/M2 | WEIGHT: 161.4 LBS

## 2024-03-28 DIAGNOSIS — D64.9 CHRONIC ANEMIA: ICD-10-CM

## 2024-03-28 DIAGNOSIS — I48.0 PAROXYSMAL ATRIAL FIBRILLATION (HCC): ICD-10-CM

## 2024-03-28 DIAGNOSIS — R94.2 ABNORMAL PFT: ICD-10-CM

## 2024-03-28 DIAGNOSIS — Z95.0 CARDIAC PACEMAKER IN SITU: ICD-10-CM

## 2024-03-28 DIAGNOSIS — I48.0 HYPERCOAGULABLE STATE DUE TO PAROXYSMAL ATRIAL FIBRILLATION (HCC): ICD-10-CM

## 2024-03-28 DIAGNOSIS — D68.69 HYPERCOAGULABLE STATE DUE TO PAROXYSMAL ATRIAL FIBRILLATION (HCC): ICD-10-CM

## 2024-03-28 DIAGNOSIS — I49.5 SINUS NODE DYSFUNCTION (HCC): ICD-10-CM

## 2024-03-28 DIAGNOSIS — I44.0 AV BLOCK, 1ST DEGREE: ICD-10-CM

## 2024-03-28 DIAGNOSIS — Z79.899 ON AMIODARONE THERAPY: ICD-10-CM

## 2024-03-28 DIAGNOSIS — I48.0 PAF (PAROXYSMAL ATRIAL FIBRILLATION) (HCC): Primary | ICD-10-CM

## 2024-03-28 DIAGNOSIS — Z95.818 PRESENCE OF WATCHMAN LEFT ATRIAL APPENDAGE CLOSURE DEVICE: ICD-10-CM

## 2024-03-28 PROCEDURE — G8420 CALC BMI NORM PARAMETERS: HCPCS | Performed by: INTERNAL MEDICINE

## 2024-03-28 PROCEDURE — 99214 OFFICE O/P EST MOD 30 MIN: CPT | Performed by: INTERNAL MEDICINE

## 2024-03-28 PROCEDURE — 1124F ACP DISCUSS-NO DSCNMKR DOCD: CPT | Performed by: INTERNAL MEDICINE

## 2024-03-28 PROCEDURE — 93000 ELECTROCARDIOGRAM COMPLETE: CPT | Performed by: INTERNAL MEDICINE

## 2024-03-28 PROCEDURE — G8427 DOCREV CUR MEDS BY ELIG CLIN: HCPCS | Performed by: INTERNAL MEDICINE

## 2024-03-28 PROCEDURE — G8484 FLU IMMUNIZE NO ADMIN: HCPCS | Performed by: INTERNAL MEDICINE

## 2024-03-28 PROCEDURE — 1036F TOBACCO NON-USER: CPT | Performed by: INTERNAL MEDICINE

## 2024-03-28 PROCEDURE — 3074F SYST BP LT 130 MM HG: CPT | Performed by: INTERNAL MEDICINE

## 2024-03-28 PROCEDURE — 3078F DIAST BP <80 MM HG: CPT | Performed by: INTERNAL MEDICINE

## 2024-03-28 NOTE — PATIENT INSTRUCTIONS
We are committed to providing you the best care possible.    If you receive a survey after visiting one of our offices, please take time to share your experience concerning your physician office visit.  These surveys are confidential and no health information about you is shared.    We are eager to improve for you and we are counting on your feedback to help make that happen.      **It is YOUR responsibilty to bring medication bottles and/or updated medication list to EACH APPOINTMENT. This will allow us to better serve you and all your healthcare needs**  Thank you for allowing us to care for you today!   We want to ensure we can follow your treatment plan and we strive to give you the best outcomes and experience possible.   If you ever have a life threatening emergency and call 911 - for an ambulance (EMS)   Our providers can only care for you at:   [facility] or [facility].   Even if you have someone take you or you drive yourself we can only care for you in a [facility] facility. Our providers are not setup at the other healthcare locations!   Please be informed that if you contact our office outside of normal business hours the physician on call cannot help with any scheduling or rescheduling issues, procedure instruction questions or any type of medication issue.    We advise you for any urgent/emergency that you go to the nearest emergency room!    PLEASE CALL OUR OFFICE DURING NORMAL BUSINESS HOURS    Monday - Friday   8 am to 5 pm    [place]: [phone]    [place]: [phone]    [place]:  [phone]

## 2024-03-28 NOTE — PROGRESS NOTES
Chavez Liu MD        OFFICE  FOLLOWUP NOTE    Chief complaints: patient is here for management of  PAF, PPM ( SICK SINUS SYNDROME), CAD, DM, HTN, H/O SYNCOPE, RETINAL HEMORRHAGE     Subjective: patient feels better, no chest pain, no shortness of breath, no dizziness, no palpitations    HPI Cricket is a 78 y.o.year old who  has a past medical history of Abdominal Doppler, Acute MI (HCC), Arthritis, CAD (coronary artery disease), Diabetes mellitus (HCC), Essential hypertension, H/O cardiovascular stress test, H/O echocardiogram, and H/O echocardiogram. and presents for management of  PAF, PPM ( SICK SINUS SYNDROME), CAD, DM, HTN, H/O SYNCOPE, RETINAL HEMORRHAGE  which are well controlled      Current Outpatient Medications   Medication Sig Dispense Refill    dapagliflozin (FARXIGA) 10 MG tablet Take 1 tablet by mouth every morning 90 tablet 3    b complex vitamins capsule Take 1 capsule by mouth daily With Vitamin C      ammonium lactate (LAC-HYDRIN) 12 % lotion Apply topically 2 times daily      metoprolol tartrate (LOPRESSOR) 25 MG tablet TAKE 1 TABLET TWICE A  tablet 3    amiodarone (CORDARONE) 200 MG tablet Take 1 tablet by mouth daily 180 tablet 1    Lancets (ONETOUCH DELICA PLUS [id number]) MISC       ONETOUCH VERIO strip       fenofibrate (TRICOR) 145 MG tablet Take 1 tablet by mouth daily      ferrous sulfate 325 (65 Fe) MG tablet Take 1 tablet by mouth daily (with breakfast)      Cyanocobalamin (VITAMIN B 12 PO) Take 500 mg by mouth every other day      aspirin 81 MG tablet Take 1 tablet by mouth daily      atorvastatin (LIPITOR) 10 MG tablet Take 1 tablet by mouth daily       No current facility-administered medications for this visit.     Allergies: Patient has no known allergies.  Past Medical History:   Diagnosis Date    Abdominal Doppler 07/06/2021    Normal study    Acute MI (HCC)     1993 - angioplasty, no stent, Dr. Ramirez    Arthritis     CAD (coronary artery disease)

## 2024-04-12 ENCOUNTER — TELEPHONE (OUTPATIENT)
Dept: CARDIOLOGY CLINIC | Age: 79
End: 2024-04-12

## 2024-04-16 ENCOUNTER — HOSPITAL ENCOUNTER (OUTPATIENT)
Age: 79
Discharge: HOME | End: 2024-04-16
Payer: MEDICARE

## 2024-04-16 DIAGNOSIS — Z79.899 ON AMIODARONE THERAPY: ICD-10-CM

## 2024-04-16 LAB
ALBUMIN SERPL-MCNC: 4.4 GM/DL (ref 3.4–5)
ALP BLD-CCNC: 60 IU/L (ref 40–128)
ALT SERPL-CCNC: 15 U/L (ref 10–40)
ANION GAP SERPL CALCULATED.3IONS-SCNC: 14 MMOL/L (ref 7–16)
AST SERPL-CCNC: 24 IU/L (ref 15–37)
BILIRUB SERPL-MCNC: 0.6 MG/DL (ref 0–1)
BUN SERPL-MCNC: 25 MG/DL (ref 6–23)
CALCIUM SERPL-MCNC: 9 MG/DL (ref 8.3–10.6)
CHLORIDE BLD-SCNC: 104 MMOL/L (ref 99–110)
CO2: 25 MMOL/L (ref 21–32)
CREAT SERPL-MCNC: 1.5 MG/DL (ref 0.9–1.3)
GFR SERPL CREATININE-BSD FRML MDRD: 48 ML/MIN/1.73M2
GLUCOSE SERPL-MCNC: 124 MG/DL (ref 70–99)
POTASSIUM SERPL-SCNC: 3.9 MMOL/L (ref 3.5–5.1)
SODIUM BLD-SCNC: 143 MMOL/L (ref 135–145)
TOTAL PROTEIN: 7 GM/DL (ref 6.4–8.2)
TSH SERPL DL<=0.005 MIU/L-ACNC: 3.8 UIU/ML (ref 0.27–4.2)

## 2024-04-16 PROCEDURE — 36415 COLL VENOUS BLD VENIPUNCTURE: CPT

## 2024-04-16 PROCEDURE — 80053 COMPREHEN METABOLIC PANEL: CPT

## 2024-04-16 PROCEDURE — 84443 ASSAY THYROID STIM HORMONE: CPT

## 2024-04-23 ENCOUNTER — OFFICE VISIT (OUTPATIENT)
Dept: CARDIOLOGY CLINIC | Age: 79
End: 2024-04-23
Payer: MEDICARE

## 2024-04-23 VITALS
WEIGHT: 160.8 LBS | HEIGHT: 68 IN | HEART RATE: 84 BPM | BODY MASS INDEX: 24.37 KG/M2 | SYSTOLIC BLOOD PRESSURE: 118 MMHG | DIASTOLIC BLOOD PRESSURE: 62 MMHG

## 2024-04-23 DIAGNOSIS — I48.0 PAF (PAROXYSMAL ATRIAL FIBRILLATION) (HCC): ICD-10-CM

## 2024-04-23 DIAGNOSIS — I10 ESSENTIAL HYPERTENSION: ICD-10-CM

## 2024-04-23 DIAGNOSIS — Z95.0 S/P PLACEMENT OF CARDIAC PACEMAKER: ICD-10-CM

## 2024-04-23 DIAGNOSIS — D68.69 HYPERCOAGULABLE STATE DUE TO PAROXYSMAL ATRIAL FIBRILLATION (HCC): ICD-10-CM

## 2024-04-23 DIAGNOSIS — I48.0 HYPERCOAGULABLE STATE DUE TO PAROXYSMAL ATRIAL FIBRILLATION (HCC): ICD-10-CM

## 2024-04-23 DIAGNOSIS — Z95.818 PRESENCE OF WATCHMAN LEFT ATRIAL APPENDAGE CLOSURE DEVICE: Primary | ICD-10-CM

## 2024-04-23 PROCEDURE — 3078F DIAST BP <80 MM HG: CPT | Performed by: NURSE PRACTITIONER

## 2024-04-23 PROCEDURE — 1124F ACP DISCUSS-NO DSCNMKR DOCD: CPT | Performed by: NURSE PRACTITIONER

## 2024-04-23 PROCEDURE — G8427 DOCREV CUR MEDS BY ELIG CLIN: HCPCS | Performed by: NURSE PRACTITIONER

## 2024-04-23 PROCEDURE — 99214 OFFICE O/P EST MOD 30 MIN: CPT | Performed by: NURSE PRACTITIONER

## 2024-04-23 PROCEDURE — 1036F TOBACCO NON-USER: CPT | Performed by: NURSE PRACTITIONER

## 2024-04-23 PROCEDURE — G8420 CALC BMI NORM PARAMETERS: HCPCS | Performed by: NURSE PRACTITIONER

## 2024-04-23 PROCEDURE — 3074F SYST BP LT 130 MM HG: CPT | Performed by: NURSE PRACTITIONER

## 2024-04-23 PROCEDURE — 93288 INTERROG EVL PM/LDLS PM IP: CPT | Performed by: NURSE PRACTITIONER

## 2024-04-23 ASSESSMENT — ENCOUNTER SYMPTOMS
ABDOMINAL PAIN: 0
SINUS PRESSURE: 0
PHOTOPHOBIA: 0
COUGH: 0
SHORTNESS OF BREATH: 0
COLOR CHANGE: 0
ABDOMINAL DISTENTION: 0
SINUS PAIN: 0

## 2024-04-23 NOTE — PATIENT INSTRUCTIONS
Please be informed that if you contact our office outside of normal business hours the physician on call cannot help with any scheduling or rescheduling issues, procedure instruction questions or any type of medication issue.    We advise you for any urgent/emergency that you go to the nearest emergency room!    PLEASE CALL OUR OFFICE DURING NORMAL BUSINESS HOURS    Monday - Friday   8 am to 5 pm    [place]: [phone]    [place]: [phone]    [place]:  [phone]  .[email or web address]  Thank you for allowing us to care for you today!   We want to ensure we can follow your treatment plan and we strive to give you the best outcomes and experience possible.   If you ever have a life threatening emergency and call 911 - for an ambulance (EMS)   Our providers can only care for you at:   [facility] or [facility].   Even if you have someone take you or you drive yourself we can only care for you in a [facility] facility. Our providers are not setup at the other healthcare locations!   We are committed to providing you the best care possible.    If you receive a survey after visiting one of our offices, please take time to share your experience concerning your physician office visit.  These surveys are confidential and no health information about you is shared.    We are eager to improve for you and we are counting on your feedback to help make that happen.

## 2024-04-23 NOTE — PROGRESS NOTES
Electrophysiology FU Note      Reason for consultation: Atrial fibrillation    Chief complaint : Follow for atrial fibrillation, pacemaker and watchman    Referring physician: Dr. Liu      Primary care physician: Sunil Kelley MD      History of Present Illness:     This visit 4/23/2024  Patient is here today for 1 year follow-up status post watchman.  Patient is feeling well.  He is not having any issues with bleeding.  He is on aspirin 81 mg daily.  He denies chest pain, palpitations, shortness of breath, lightheadedness, dizziness, edema or syncope.  Patient reports that he was discontinued by cardiology of the amiodarone due to abnormal PFTs.    Previous visit 10/13/2023  Patient here for follow up on VAL s/p watchman. Patient denies chest pain,  palpitations, shortness of breath, edema, dizziness, or syncope.        PRevious visit (6/9/2023)      Chief Complaint   Patient presents with    Follow-up     Patient here f/u VAL. Patient denies Chest Pains, Palpitations, SOB,Dizziness and Edema. Patient does not drink alcohol or smoke. Patient drinks coffee daily.  Patient has some bruises but better than before. Patient overall feels better           Previous visit: (3/3/2023)      Chief Complaint   Patient presents with    Atrial Fibrillation     Patient is seen for afib.   Patient is here to discuss watchman.   Patient denies chest pain, shortness of breath, dizziness, palpitations, and edema.      Patient had PAF episode and he was started on Xarelto.  Patient then on Xarelto had bleeding in the eye x 2.  Then patient was stopped off Xarelto.  Patient also has chronic anemia and Dr. Rondon has been following the patient.      Previous visit: (8/5/2022)    Patient is having extreme fatigue.  Patient also reports dizziness but did not pass out.  Patient had event monitor and he was told it was abnormal and here for further assessment.  Patient denies any chest pain,

## 2024-05-13 PROBLEM — R94.2 ABNORMAL PFT: Status: ACTIVE | Noted: 2024-05-13

## 2024-05-15 ENCOUNTER — PROCEDURE VISIT (OUTPATIENT)
Dept: CARDIOLOGY CLINIC | Age: 79
End: 2024-05-15

## 2024-05-15 DIAGNOSIS — I44.0 AV BLOCK, 1ST DEGREE: ICD-10-CM

## 2024-05-15 DIAGNOSIS — Z95.0 CARDIAC PACEMAKER IN SITU: Primary | ICD-10-CM

## 2024-05-15 DIAGNOSIS — I49.5 SINUS NODE DYSFUNCTION (HCC): ICD-10-CM

## 2024-07-08 ENCOUNTER — HOSPITAL ENCOUNTER (OUTPATIENT)
Age: 79
Discharge: HOME | End: 2024-07-08
Payer: MEDICARE

## 2024-07-08 DIAGNOSIS — N18.32 STAGE 3B CHRONIC KIDNEY DISEASE (HCC): ICD-10-CM

## 2024-07-08 DIAGNOSIS — Z95.818 PRESENCE OF WATCHMAN LEFT ATRIAL APPENDAGE CLOSURE DEVICE: ICD-10-CM

## 2024-07-08 DIAGNOSIS — I10 ESSENTIAL HYPERTENSION: ICD-10-CM

## 2024-07-08 DIAGNOSIS — E11.9 TYPE 2 DIABETES MELLITUS WITHOUT COMPLICATION, WITHOUT LONG-TERM CURRENT USE OF INSULIN (HCC): ICD-10-CM

## 2024-07-08 LAB
ALBUMIN SERPL-MCNC: 4.2 GM/DL (ref 3.4–5)
ANION GAP SERPL CALCULATED.3IONS-SCNC: 13 MMOL/L (ref 7–16)
BASOPHILS ABSOLUTE: 0 K/CU MM
BASOPHILS RELATIVE PERCENT: 0.5 % (ref 0–1)
BILIRUBIN, URINE: NEGATIVE MG/DL
BLOOD, URINE: NEGATIVE
BUN SERPL-MCNC: 19 MG/DL (ref 6–23)
CALCIUM SERPL-MCNC: 9.4 MG/DL (ref 8.3–10.6)
CHLORIDE BLD-SCNC: 106 MMOL/L (ref 99–110)
CLARITY, UA: CLEAR
CO2: 26 MMOL/L (ref 21–32)
COLOR, UA: YELLOW
COMMENT UA: ABNORMAL
CREAT SERPL-MCNC: 1.1 MG/DL (ref 0.9–1.3)
CREATININE URINE: 86.2 MG/DL (ref 39–259)
DIFFERENTIAL TYPE: ABNORMAL
EOSINOPHILS ABSOLUTE: 0.2 K/CU MM
EOSINOPHILS RELATIVE PERCENT: 3 % (ref 0–3)
GFR, ESTIMATED: 69 ML/MIN/1.73M2
GLUCOSE SERPL-MCNC: 119 MG/DL (ref 70–99)
GLUCOSE URINE: >1000 MG/DL
HCT VFR BLD CALC: 36.5 % (ref 42–52)
HEMOGLOBIN: 11.7 GM/DL (ref 13.5–18)
IMMATURE NEUTROPHIL %: 0.2 % (ref 0–0.43)
KETONES, URINE: NEGATIVE MG/DL
LEUKOCYTE ESTERASE, URINE: NEGATIVE
LYMPHOCYTES ABSOLUTE: 1.2 K/CU MM
LYMPHOCYTES RELATIVE PERCENT: 20.5 % (ref 24–44)
MAGNESIUM: 1.9 MG/DL (ref 1.8–2.4)
MCH RBC QN AUTO: 30.6 PG (ref 27–31)
MCHC RBC AUTO-ENTMCNC: 32.1 % (ref 32–36)
MCV RBC AUTO: 95.5 FL (ref 78–100)
MONOCYTES ABSOLUTE: 0.5 K/CU MM
MONOCYTES RELATIVE PERCENT: 8.2 % (ref 0–4)
NEUTROPHILS ABSOLUTE: 3.9 K/CU MM
NEUTROPHILS RELATIVE PERCENT: 67.6 % (ref 36–66)
NITRITE URINE, QUANTITATIVE: NEGATIVE
NUCLEATED RBC %: 0 %
PDW BLD-RTO: 13.7 % (ref 11.7–14.9)
PH, URINE: 6 (ref 5–8)
PHOSPHORUS: 3.6 MG/DL (ref 2.5–4.9)
PLATELET # BLD: 203 K/CU MM (ref 140–440)
PMV BLD AUTO: 10.5 FL (ref 7.5–11.1)
POTASSIUM SERPL-SCNC: 3.7 MMOL/L (ref 3.5–5.1)
PROT/CREAT RATIO, UR: 0.1
PROTEIN UA: NEGATIVE MG/DL
RBC # BLD: 3.82 M/CU MM (ref 4.6–6.2)
SODIUM BLD-SCNC: 145 MMOL/L (ref 135–145)
SPECIFIC GRAVITY UA: 1.01 (ref 1–1.03)
TOTAL IMMATURE NEUTOROPHIL: 0.01 K/CU MM
TOTAL NUCLEATED RBC: 0 K/CU MM
URINE TOTAL PROTEIN: 12.4 MG/DL
UROBILINOGEN, URINE: 0.2 MG/DL (ref 0.2–1)
WBC # BLD: 5.7 K/CU MM (ref 4–10.5)

## 2024-07-08 PROCEDURE — 83735 ASSAY OF MAGNESIUM: CPT

## 2024-07-08 PROCEDURE — 81003 URINALYSIS AUTO W/O SCOPE: CPT

## 2024-07-08 PROCEDURE — 84156 ASSAY OF PROTEIN URINE: CPT

## 2024-07-08 PROCEDURE — 82040 ASSAY OF SERUM ALBUMIN: CPT

## 2024-07-08 PROCEDURE — 36415 COLL VENOUS BLD VENIPUNCTURE: CPT

## 2024-07-08 PROCEDURE — 80048 BASIC METABOLIC PNL TOTAL CA: CPT

## 2024-07-08 PROCEDURE — 82570 ASSAY OF URINE CREATININE: CPT

## 2024-07-08 PROCEDURE — 84100 ASSAY OF PHOSPHORUS: CPT

## 2024-07-08 PROCEDURE — 85025 COMPLETE CBC W/AUTO DIFF WBC: CPT

## 2024-07-11 PROBLEM — N18.2 CHRONIC KIDNEY DISEASE, STAGE II (MILD): Status: ACTIVE | Noted: 2024-07-11

## 2024-07-15 ENCOUNTER — OFFICE VISIT (OUTPATIENT)
Dept: CARDIOLOGY CLINIC | Age: 79
End: 2024-07-15
Payer: MEDICARE

## 2024-07-15 VITALS
WEIGHT: 157.6 LBS | HEART RATE: 62 BPM | DIASTOLIC BLOOD PRESSURE: 60 MMHG | BODY MASS INDEX: 23.34 KG/M2 | SYSTOLIC BLOOD PRESSURE: 162 MMHG | HEIGHT: 69 IN | OXYGEN SATURATION: 96 %

## 2024-07-15 DIAGNOSIS — Z95.818 PRESENCE OF WATCHMAN LEFT ATRIAL APPENDAGE CLOSURE DEVICE: ICD-10-CM

## 2024-07-15 DIAGNOSIS — I44.0 AV BLOCK, 1ST DEGREE: ICD-10-CM

## 2024-07-15 DIAGNOSIS — H35.61 RETINAL HEMORRHAGE OF RIGHT EYE: ICD-10-CM

## 2024-07-15 DIAGNOSIS — I10 ESSENTIAL HYPERTENSION: ICD-10-CM

## 2024-07-15 DIAGNOSIS — Z95.0 S/P PLACEMENT OF CARDIAC PACEMAKER: ICD-10-CM

## 2024-07-15 DIAGNOSIS — Z95.0 CARDIAC PACEMAKER IN SITU: Primary | ICD-10-CM

## 2024-07-15 DIAGNOSIS — I49.5 SINUS NODE DYSFUNCTION (HCC): ICD-10-CM

## 2024-07-15 PROCEDURE — 1124F ACP DISCUSS-NO DSCNMKR DOCD: CPT | Performed by: INTERNAL MEDICINE

## 2024-07-15 PROCEDURE — 1036F TOBACCO NON-USER: CPT | Performed by: INTERNAL MEDICINE

## 2024-07-15 PROCEDURE — 3078F DIAST BP <80 MM HG: CPT | Performed by: INTERNAL MEDICINE

## 2024-07-15 PROCEDURE — 3077F SYST BP >= 140 MM HG: CPT | Performed by: INTERNAL MEDICINE

## 2024-07-15 PROCEDURE — 99214 OFFICE O/P EST MOD 30 MIN: CPT | Performed by: INTERNAL MEDICINE

## 2024-07-15 PROCEDURE — G8427 DOCREV CUR MEDS BY ELIG CLIN: HCPCS | Performed by: INTERNAL MEDICINE

## 2024-07-15 PROCEDURE — G8420 CALC BMI NORM PARAMETERS: HCPCS | Performed by: INTERNAL MEDICINE

## 2024-07-15 RX ORDER — AMLODIPINE BESYLATE 2.5 MG/1
2.5 TABLET ORAL DAILY
Qty: 30 TABLET | Refills: 3 | Status: SHIPPED | OUTPATIENT
Start: 2024-07-15 | End: 2024-07-16 | Stop reason: SDUPTHER

## 2024-07-15 NOTE — PROGRESS NOTES
echocardiogram 2019    EF 55-60%, Normal study.    H/O echocardiogram 2020    EF 55-60%, Mild MR & LVH.     Past Surgical History:   Procedure Laterality Date    BALLOON ANGIOPLASTY, ARTERY  1994    CARDIAC CATHETERIZATION      CATARACT REMOVAL      OTHER SURGICAL HISTORY  2023    Left Atrial appendage Closure- Watchman Implant- 27mm    PACEMAKER PLACEMENT      PACEMAKER PLACEMENT Left 2022    [employer]: Dalia XT DR JESSICA Giron Dual PPM    TOE SURGERY       Family History   Problem Relation Age of Onset    Heart Attack Father     Heart Disease Father      Social History     Tobacco Use    Smoking status: Former     Current packs/day: 0.00     Average packs/day: 1.5 packs/day for 24.0 years (36.0 ttl pk-yrs)     Types: Cigarettes     Start date:      Quit date:      Years since quittin.5    Smokeless tobacco: Never   Substance Use Topics    Alcohol use: No     Comment: Caffeine: 6-8 cups coffee daily.      [unfilled]  Review of Systems:   Constitutional: No Fever or Weight Loss   Eyes: No Decreased Vision  ENT: No Headaches, Hearing Loss or Vertigo  Cardiovascular: No chest pain, dyspnea on exertion, palpitations or loss of consciousness  Respiratory: No cough or wheezing    Gastrointestinal: No abdominal pain, appetite loss, blood in stools, constipation, diarrhea or heartburn  Genitourinary: No dysuria, trouble voiding, or hematuria  Musculoskeletal:  No gait disturbance, weakness or joint complaints  Integumentary: No rash or pruritis  Neurological: No TIA or stroke symptoms  Psychiatric: No anxiety or depression  Endocrine: No malaise, fatigue or temperature intolerance  Hematologic/Lymphatic: No bleeding problems, blood clots or swollen lymph nodes  Allergic/Immunologic: No nasal congestion or hives  All systems negative except as marked.   Objective:  BP (!) 162/60 (Site: Left Upper Arm, Position: Sitting, Cuff Size: Medium Adult)   Pulse 62   Ht 1.753 m (5' 9\")   Wt

## 2024-07-15 NOTE — PATIENT INSTRUCTIONS
**It is YOUR responsibilty to bring medication bottles and/or updated medication list to EACH APPOINTMENT. This will allow us to better serve you and all your healthcare needs**  Thank you for allowing us to care for you today!   We want to ensure we can follow your treatment plan and we strive to give you the best outcomes and experience possible.   If you ever have a life threatening emergency and call 911 - for an ambulance (EMS)   Our providers can only care for you at:   [facility] or [facility].   Even if you have someone take you or you drive yourself we can only care for you in a [facility]. Our providers are not setup at the other healthcare locations!   Please be informed that if you contact our office outside of normal business hours the physician on call cannot help with any scheduling or rescheduling issues, procedure instruction questions or any type of medication issue.    We advise you for any urgent/emergency that you go to the nearest emergency room!    PLEASE CALL OUR OFFICE DURING NORMAL BUSINESS HOURS    Monday - Friday   8 am to 5 pm    [place]: [phone]    [place]: [phone]    [place]:  [phone]  We are committed to providing you the best care possible.    If you receive a survey after visiting one of our offices, please take time to share your experience concerning your physician office visit.  These surveys are confidential and no health information about you is shared.    We are eager to improve for you and we are counting on your feedback to help make that happen.

## 2024-07-16 RX ORDER — AMLODIPINE BESYLATE 2.5 MG/1
2.5 TABLET ORAL DAILY
Qty: 90 TABLET | Refills: 1 | Status: SHIPPED | OUTPATIENT
Start: 2024-07-16

## 2024-07-16 NOTE — TELEPHONE ENCOUNTER
Patient called asking if we can Call [facility]   Scripts and change his Amlodipine to 90 day   They have contacted him and will not send   For 30 day please advise.

## 2024-07-23 ENCOUNTER — TELEPHONE (OUTPATIENT)
Dept: CARDIOLOGY CLINIC | Age: 79
End: 2024-07-23

## 2024-07-25 ENCOUNTER — OFFICE VISIT (OUTPATIENT)
Dept: CARDIOLOGY CLINIC | Age: 79
End: 2024-07-25

## 2024-07-25 VITALS
HEART RATE: 72 BPM | BODY MASS INDEX: 23.43 KG/M2 | SYSTOLIC BLOOD PRESSURE: 138 MMHG | DIASTOLIC BLOOD PRESSURE: 76 MMHG | WEIGHT: 154.6 LBS | HEIGHT: 68 IN

## 2024-07-25 DIAGNOSIS — I48.0 PAF (PAROXYSMAL ATRIAL FIBRILLATION) (HCC): Primary | ICD-10-CM

## 2024-07-25 DIAGNOSIS — I10 ESSENTIAL HYPERTENSION: ICD-10-CM

## 2024-07-25 DIAGNOSIS — D68.69 HYPERCOAGULABLE STATE DUE TO PAROXYSMAL ATRIAL FIBRILLATION (HCC): ICD-10-CM

## 2024-07-25 DIAGNOSIS — I48.0 HYPERCOAGULABLE STATE DUE TO PAROXYSMAL ATRIAL FIBRILLATION (HCC): ICD-10-CM

## 2024-07-25 DIAGNOSIS — Z95.0 S/P PLACEMENT OF CARDIAC PACEMAKER: ICD-10-CM

## 2024-07-25 DIAGNOSIS — Z95.818 PRESENCE OF WATCHMAN LEFT ATRIAL APPENDAGE CLOSURE DEVICE: ICD-10-CM

## 2024-07-25 ASSESSMENT — ENCOUNTER SYMPTOMS
COUGH: 0
SINUS PAIN: 0
ABDOMINAL DISTENTION: 0
COLOR CHANGE: 0
SHORTNESS OF BREATH: 0
ABDOMINAL PAIN: 0
PHOTOPHOBIA: 0
SINUS PRESSURE: 0

## 2024-07-25 NOTE — PROGRESS NOTES
Electrophysiology FU Note      Reason for consultation: Atrial fibrillation    Chief complaint : Follow for atrial fibrillation, pacemaker and watchman    Referring physician: Dr. Liu      Primary care physician: Sunil Kelley MD      History of Present Illness:     This visit 7/25/2024  Patient is here today for follow up on PAF on pacemaker.   Patient states he has been feeling well since last office visit. He does not feel afib. He denies chest pain, palpitations, shortness of breath, lightheadedness, dizziness, edema or syncope. He denies alcohol and tobacco. He drinks 8 cups of coffee a day. He denies exercise. He takes his medications as prescribed.     Previous visit 4/23/2024  Patient is here today for 1 year follow-up status post watchman.  Patient is feeling well.  He is not having any issues with bleeding.  He is on aspirin 81 mg daily.  He denies chest pain, palpitations, shortness of breath, lightheadedness, dizziness, edema or syncope.  Patient reports that he was discontinued by cardiology of the amiodarone due to abnormal PFTs.    Previous visit 10/13/2023  Patient here for follow up on VAL s/p watchman. Patient denies chest pain,  palpitations, shortness of breath, edema, dizziness, or syncope.        PRevious visit (6/9/2023)      Chief Complaint   Patient presents with    Follow-up     Patient here f/u VAL. Patient denies Chest Pains, Palpitations, SOB,Dizziness and Edema. Patient does not drink alcohol or smoke. Patient drinks coffee daily.  Patient has some bruises but better than before. Patient overall feels better           Previous visit: (3/3/2023)      Chief Complaint   Patient presents with    Atrial Fibrillation     Patient is seen for afib.   Patient is here to discuss watchman.   Patient denies chest pain, shortness of breath, dizziness, palpitations, and edema.      Patient had PAF episode and he was started on Xarelto.  Patient then on

## 2024-07-28 PROBLEM — Z79.899 ON AMIODARONE THERAPY: Status: RESOLVED | Noted: 2023-10-13 | Resolved: 2024-07-28

## 2024-07-28 PROBLEM — Z95.818 PRESENCE OF WATCHMAN LEFT ATRIAL APPENDAGE CLOSURE DEVICE: Status: RESOLVED | Noted: 2023-04-06 | Resolved: 2024-07-28

## 2024-08-01 ENCOUNTER — PROCEDURE VISIT (OUTPATIENT)
Dept: NON INVASIVE DIAGNOSTICS | Age: 79
End: 2024-08-01

## 2024-08-01 DIAGNOSIS — I48.0 PAF (PAROXYSMAL ATRIAL FIBRILLATION) (HCC): Primary | ICD-10-CM

## 2024-08-05 ENCOUNTER — TELEPHONE (OUTPATIENT)
Dept: CARDIOLOGY CLINIC | Age: 79
End: 2024-08-05

## 2024-08-05 NOTE — TELEPHONE ENCOUNTER
Called patient to scheduled Sotalol admission. Patient scheduled for 8/19/2024 @ 3073. Called direct admit, spoke with EH Domínguez and admission scheduled.

## 2024-08-05 NOTE — TELEPHONE ENCOUNTER
----- Message from ANNABELLA Meehan CNP sent at 8/1/2024 12:21 PM EDT -----  Plesae schedule for sotalol

## 2024-08-15 ENCOUNTER — OFFICE VISIT (OUTPATIENT)
Dept: CARDIOLOGY CLINIC | Age: 79
End: 2024-08-15
Payer: MEDICARE

## 2024-08-15 VITALS
DIASTOLIC BLOOD PRESSURE: 66 MMHG | BODY MASS INDEX: 23.4 KG/M2 | WEIGHT: 154.4 LBS | HEART RATE: 101 BPM | SYSTOLIC BLOOD PRESSURE: 142 MMHG | HEIGHT: 68 IN

## 2024-08-15 DIAGNOSIS — I25.10 CORONARY ARTERY DISEASE INVOLVING NATIVE CORONARY ARTERY OF NATIVE HEART WITHOUT ANGINA PECTORIS: ICD-10-CM

## 2024-08-15 DIAGNOSIS — I10 ESSENTIAL HYPERTENSION: Primary | ICD-10-CM

## 2024-08-15 DIAGNOSIS — Z95.0 PACEMAKER: ICD-10-CM

## 2024-08-15 DIAGNOSIS — E78.5 DYSLIPIDEMIA: ICD-10-CM

## 2024-08-15 DIAGNOSIS — I48.0 PAF (PAROXYSMAL ATRIAL FIBRILLATION) (HCC): ICD-10-CM

## 2024-08-15 PROCEDURE — 99214 OFFICE O/P EST MOD 30 MIN: CPT | Performed by: NURSE PRACTITIONER

## 2024-08-15 PROCEDURE — G8427 DOCREV CUR MEDS BY ELIG CLIN: HCPCS | Performed by: NURSE PRACTITIONER

## 2024-08-15 PROCEDURE — 3077F SYST BP >= 140 MM HG: CPT | Performed by: NURSE PRACTITIONER

## 2024-08-15 PROCEDURE — 1036F TOBACCO NON-USER: CPT | Performed by: NURSE PRACTITIONER

## 2024-08-15 PROCEDURE — G8420 CALC BMI NORM PARAMETERS: HCPCS | Performed by: NURSE PRACTITIONER

## 2024-08-15 PROCEDURE — 1124F ACP DISCUSS-NO DSCNMKR DOCD: CPT | Performed by: NURSE PRACTITIONER

## 2024-08-15 PROCEDURE — 3078F DIAST BP <80 MM HG: CPT | Performed by: NURSE PRACTITIONER

## 2024-08-15 ASSESSMENT — ENCOUNTER SYMPTOMS: SHORTNESS OF BREATH: 0

## 2024-08-15 NOTE — PROGRESS NOTES
cholesterol lab      Echo:5/22/23  Left ventricular systolic function is normal.   Ejection fraction is visually estimated at 55-60%.   PPM wiring visualized in right heart.   Mild mitral regurgitation with multiple jets.      0: 2.29 cm   45: 2.4 cm   90: 2.4 cm   135: 2.4 cm .      Post:   Successful implantation of a 27 mm Watchman device.   The device appears well seated with good compression.   No residual leak noted.   No evidence of any pericardial effusion.     Stress Test:4/9/19  Normal LV function.   There is normal isotope uptake following exercise and at rest. There is no   evidence of exercise induced ischemia.   This is a normal study.      Assessment/ Plan:     Pacemaker  Mode: AAIR --- DDDR  Sensing is normal. Impedence is normal.  Threshold is normal.  There has not been interval changes.  Estimated battery life is 9.0 years  The underlying rhythm is AP,VS.  91.7 % atrial paced; 22.4 % ventricular paced.    Atrial Arrhythmia : 1 episode. 0.8% burden. S/p watchman on ASA and metprolol  Non sustained VT episodes : No  Sustained VT episodes : No  The patient is pacemaker dependent.      CAD (coronary artery disease)  -PTCA in 1994 and then 6 months later required second angioplasty. Stress test in 2019 did not show any ischemia.      PAF (paroxysmal atrial fibrillation) ([facility])   -Apaced. Currently tachycardic. S/P watchman, now off anticoagulation. Patient awaiting sotaolol therapy initiation on Monday.     Essential hypertension   -Slight elevation today. Patient took medication 1 hr prior to visit. Continue with Norvasc 2.5 mg daily.     Dyslipidemia   -LDL 75 on recent labs. Continue with statin therapy, Lipitor 10 mg daily.       Patient seen, interviewed and examined. Testing was reviewed.    Lifestyle and risk factor modificatons discussed. Various goals are discussed and questions answered. Continue current medications. Appropriate prescriptions are addressed.  Questions answered and patient

## 2024-08-15 NOTE — ASSESSMENT & PLAN NOTE
Mode: AAIR --- DDDR      Sensing is normal. Impedence is normal.  Threshold is normal.      There has not been interval changes.     Estimated battery life is 9.0 years      The underlying rhythm is AP,VS.  91.7 % atrial paced; 22.4 % ventricular paced.       Atrial Arrhythmia : 1 episode. 0.8% burden. S/p watchman on ASA and metprolol     Non sustained VT episodes : No     Sustained VT episodes : No     Patient activity reported 2.7 hrs/day     The patient is pacemaker dependent.

## 2024-08-19 ENCOUNTER — TELEPHONE (OUTPATIENT)
Dept: CARDIOLOGY CLINIC | Age: 79
End: 2024-08-19

## 2024-08-19 NOTE — TELEPHONE ENCOUNTER
Called patient to advise per Hannah unable to do direct admit today for Sotalol loading d/t beds. Hannah stated will put on for tomorrow. Advised patient to be ready at 0930 but not to go the hospital until he hears from myself or the hospital with a room number. Patient voiced understanding with no other c/o noted.

## 2024-08-20 ENCOUNTER — TELEPHONE (OUTPATIENT)
Dept: CARDIOLOGY CLINIC | Age: 79
End: 2024-08-20

## 2024-08-22 ENCOUNTER — PROCEDURE VISIT (OUTPATIENT)
Dept: CARDIOLOGY CLINIC | Age: 79
End: 2024-08-22

## 2024-08-22 DIAGNOSIS — Z95.0 CARDIAC PACEMAKER IN SITU: Primary | ICD-10-CM

## 2024-08-22 DIAGNOSIS — I49.5 SINUS NODE DYSFUNCTION (HCC): ICD-10-CM

## 2024-08-22 DIAGNOSIS — I44.0 AV BLOCK, 1ST DEGREE: ICD-10-CM

## 2024-09-03 ENCOUNTER — TELEPHONE (OUTPATIENT)
Dept: CARDIOLOGY CLINIC | Age: 79
End: 2024-09-03

## 2024-09-03 ENCOUNTER — HOSPITAL ENCOUNTER (INPATIENT)
Age: 79
LOS: 1 days | Discharge: HOME | DRG: 309 | End: 2024-09-03
Attending: INTERNAL MEDICINE | Admitting: INTERNAL MEDICINE
Payer: MEDICARE

## 2024-09-03 VITALS
RESPIRATION RATE: 15 BRPM | BODY MASS INDEX: 22.73 KG/M2 | HEART RATE: 63 BPM | WEIGHT: 149.47 LBS | SYSTOLIC BLOOD PRESSURE: 141 MMHG | DIASTOLIC BLOOD PRESSURE: 69 MMHG | OXYGEN SATURATION: 100 % | TEMPERATURE: 98.8 F

## 2024-09-03 PROBLEM — Z51.81 ENCOUNTER FOR MONITORING SOTALOL THERAPY: Status: ACTIVE | Noted: 2024-09-03

## 2024-09-03 PROBLEM — Z79.899 ENCOUNTER FOR MONITORING SOTALOL THERAPY: Status: ACTIVE | Noted: 2024-09-03

## 2024-09-03 LAB
ANION GAP SERPL CALCULATED.3IONS-SCNC: 8 MMOL/L (ref 7–16)
BUN SERPL-MCNC: 17 MG/DL (ref 6–23)
CALCIUM SERPL-MCNC: 9.2 MG/DL (ref 8.3–10.6)
CHLORIDE BLD-SCNC: 105 MMOL/L (ref 99–110)
CO2: 32 MMOL/L (ref 21–32)
CREAT SERPL-MCNC: 0.9 MG/DL (ref 0.9–1.3)
GFR, ESTIMATED: 88 ML/MIN/1.73M2
GLUCOSE SERPL-MCNC: 120 MG/DL (ref 70–99)
HCT VFR BLD CALC: 32.3 % (ref 42–52)
HEMOGLOBIN: 10.4 GM/DL (ref 13.5–18)
MAGNESIUM: 2.1 MG/DL (ref 1.8–2.4)
MCH RBC QN AUTO: 30.5 PG (ref 27–31)
MCHC RBC AUTO-ENTMCNC: 32.2 % (ref 32–36)
MCV RBC AUTO: 94.7 FL (ref 78–100)
PDW BLD-RTO: 13.3 % (ref 11.7–14.9)
PHOSPHORUS: 4.3 MG/DL (ref 2.5–4.9)
PLATELET # BLD: 171 K/CU MM (ref 140–440)
PMV BLD AUTO: 9.5 FL (ref 7.5–11.1)
POTASSIUM SERPL-SCNC: 4 MMOL/L (ref 3.5–5.1)
RBC # BLD: 3.41 M/CU MM (ref 4.6–6.2)
SODIUM BLD-SCNC: 145 MMOL/L (ref 135–145)
WBC # BLD: 5.4 K/CU MM (ref 4–10.5)

## 2024-09-03 PROCEDURE — 94761 N-INVAS EAR/PLS OXIMETRY MLT: CPT

## 2024-09-03 PROCEDURE — 83735 ASSAY OF MAGNESIUM: CPT

## 2024-09-03 PROCEDURE — 93005 ELECTROCARDIOGRAM TRACING: CPT | Performed by: NURSE PRACTITIONER

## 2024-09-03 PROCEDURE — 80048 BASIC METABOLIC PNL TOTAL CA: CPT

## 2024-09-03 PROCEDURE — 2140000000 HC CCU INTERMEDIATE R&B

## 2024-09-03 PROCEDURE — 84100 ASSAY OF PHOSPHORUS: CPT

## 2024-09-03 PROCEDURE — 85027 COMPLETE CBC AUTOMATED: CPT

## 2024-09-03 ASSESSMENT — ENCOUNTER SYMPTOMS
SINUS PAIN: 0
SHORTNESS OF BREATH: 0
ABDOMINAL PAIN: 0
SINUS PRESSURE: 0
COLOR CHANGE: 0
COUGH: 0
ABDOMINAL DISTENTION: 0
PHOTOPHOBIA: 0

## 2024-09-03 NOTE — PROGRESS NOTES
4 Eyes Skin Assessment     NAME:  Cricket Ng  YOB: 1945  MEDICAL RECORD NUMBER:  [id number]    The patient is being assessed for  Admission    I agree that at least one RN has performed a thorough Head to Toe Skin Assessment on the patient. ALL assessment sites listed below have been assessed.      Areas assessed by both nurses:    Head, Face, Ears, Shoulders, Back, Chest, Arms, Elbows, Hands, Sacrum. Buttock, Coccyx, Ischium, Legs. Feet and Heels, and Under Medical Devices         Does the Patient have a Wound? No noted wound(s)       Brendan Prevention initiated by RN: No  Wound Care Orders initiated by RN: No    Pressure Injury (Stage 3,4, Unstageable, DTI, NWPT, and Complex wounds) if present, place Wound referral order by RN under : No    New Ostomies, if present place, Ostomy referral order under : No     Nurse 1 eSignature: Electronically signed by Helga Arriola RN on 9/3/24 at 11:41 AM EDT    **SHARE this note so that the co-signing nurse can place an eSignature**    Nurse 2 eSignature: Electronically signed by Beatrice Fonseca RN on 9/3/24 at 1:23 PM EDT

## 2024-09-03 NOTE — TELEPHONE ENCOUNTER
[facility] called with bed assignment   Room -- 3117 bed is ready now

## 2024-09-03 NOTE — TELEPHONE ENCOUNTER
Called patient and advised of room number 3111 for direct admit on sotalol. Patient voiced understanding and stated he would head over to the hospital. No other c/o noted. Hannah segal CNP also advised of room number.

## 2024-09-03 NOTE — H&P
Electrophysiology History and Physical Note      Referring physician: Dr. Liu      Primary care physician: Sunil Kelley MD      History of Present Illness:     Cricket Ng is a 76 year old male with a history of CAD, HTN, Diabetes type 2, dyslipidemia, and iron deficiency anemia.  Patient is here for sotalol monitoring therapy as he is having afib on pacemaker. Currently denies chest pain, palpitations, shortness of breath, lightheadedness, dizziness, edema or syncope.   Previously had been on amiodarone however discontinued due to abnormal PFT's.              Pastmedical history:   Past Medical History:   Diagnosis Date    Abdominal Doppler 07/06/2021    Normal study    Acute MI (HCC)     1993 - angioplasty, no stent, Dr. Ramirez    Arthritis     CAD (coronary artery disease)     Diabetes mellitus ([facility])     Essential hypertension 08/18/2020    H/O cardiovascular stress test 04/09/2019    Normal study.    H/O echocardiogram 04/18/2019    EF 55-60%, Normal study.    H/O echocardiogram 12/21/2020    EF 55-60%, Mild MR & LVH.       Surgical history :   Past Surgical History:   Procedure Laterality Date    BALLOON ANGIOPLASTY, ARTERY  02/1994    CARDIAC CATHETERIZATION      CATARACT REMOVAL      OTHER SURGICAL HISTORY  04/06/2023    Left Atrial appendage Closure- Watchman Implant- 27mm    PACEMAKER PLACEMENT      PACEMAKER PLACEMENT Left 08/23/2022    [employer]: Dalia XT DR JESSICA Giron Dual PPM    TOE SURGERY         Family history:   Family History   Problem Relation Age of Onset    Heart Attack Father     Heart Disease Father        Social history :  reports that he quit smoking about 30 years ago. His smoking use included cigarettes. He started smoking about 54 years ago. He has a 36 pack-year smoking history. He has never used smokeless tobacco. He reports that he does not drink alcohol and does not use drugs.    No Known Allergies    No current facility-administered

## 2024-09-03 NOTE — DISCHARGE SUMMARY
sounds normal; no masses,  no organomegaly  Extremities: extremities normal, atraumatic, no cyanosis or edema  Pulses: 2+ and symmetric  Skin: Skin color, texture, turgor normal. No rashes or lesions  Neurologic: Grossly normal    Disposition:   home    Signed:  ANNABELLA Meehan - CNP  9/3/2024, 2:53 PM

## 2024-09-04 ENCOUNTER — TELEPHONE (OUTPATIENT)
Dept: CARDIOLOGY CLINIC | Age: 79
End: 2024-09-04

## 2024-09-04 LAB
EKG ATRIAL RATE: 110 BPM
EKG DIAGNOSIS: NORMAL
EKG Q-T INTERVAL: 554 MS
EKG QRS DURATION: 188 MS
EKG QTC CALCULATION (BAZETT): 557 MS
EKG R AXIS: -79 DEGREES
EKG T AXIS: 84 DEGREES
EKG VENTRICULAR RATE: 61 BPM

## 2024-09-04 PROCEDURE — 93010 ELECTROCARDIOGRAM REPORT: CPT | Performed by: INTERNAL MEDICINE

## 2024-09-04 NOTE — TELEPHONE ENCOUNTER
Pt called in and stated he was d/c from [facility] on 09/03/24 and was advised to call in and make a follow up appointment with Dr. Frye. Please call pt back.

## 2024-09-04 NOTE — TELEPHONE ENCOUNTER
Spoke with patient and scheduled him for office visit follow up with Hannah Turcios NP on 9/12/24 @ 1215pm.    Patient advised understanding.

## 2024-09-12 ENCOUNTER — OFFICE VISIT (OUTPATIENT)
Dept: CARDIOLOGY CLINIC | Age: 79
End: 2024-09-12
Payer: MEDICARE

## 2024-09-12 VITALS
HEART RATE: 76 BPM | SYSTOLIC BLOOD PRESSURE: 120 MMHG | DIASTOLIC BLOOD PRESSURE: 68 MMHG | WEIGHT: 152.8 LBS | HEIGHT: 68 IN | BODY MASS INDEX: 23.16 KG/M2

## 2024-09-12 DIAGNOSIS — Z95.0 PACEMAKER: ICD-10-CM

## 2024-09-12 DIAGNOSIS — I10 ESSENTIAL HYPERTENSION: ICD-10-CM

## 2024-09-12 DIAGNOSIS — I48.0 HYPERCOAGULABLE STATE DUE TO PAROXYSMAL ATRIAL FIBRILLATION (HCC): ICD-10-CM

## 2024-09-12 DIAGNOSIS — D68.69 HYPERCOAGULABLE STATE DUE TO PAROXYSMAL ATRIAL FIBRILLATION (HCC): ICD-10-CM

## 2024-09-12 DIAGNOSIS — I48.0 PAF (PAROXYSMAL ATRIAL FIBRILLATION) (HCC): Primary | ICD-10-CM

## 2024-09-12 PROCEDURE — G8420 CALC BMI NORM PARAMETERS: HCPCS | Performed by: NURSE PRACTITIONER

## 2024-09-12 PROCEDURE — 1111F DSCHRG MED/CURRENT MED MERGE: CPT | Performed by: NURSE PRACTITIONER

## 2024-09-12 PROCEDURE — G8427 DOCREV CUR MEDS BY ELIG CLIN: HCPCS | Performed by: NURSE PRACTITIONER

## 2024-09-12 PROCEDURE — 1036F TOBACCO NON-USER: CPT | Performed by: NURSE PRACTITIONER

## 2024-09-12 PROCEDURE — 3078F DIAST BP <80 MM HG: CPT | Performed by: NURSE PRACTITIONER

## 2024-09-12 PROCEDURE — 3074F SYST BP LT 130 MM HG: CPT | Performed by: NURSE PRACTITIONER

## 2024-09-12 PROCEDURE — 99214 OFFICE O/P EST MOD 30 MIN: CPT | Performed by: NURSE PRACTITIONER

## 2024-09-12 PROCEDURE — 1124F ACP DISCUSS-NO DSCNMKR DOCD: CPT | Performed by: NURSE PRACTITIONER

## 2024-09-12 ASSESSMENT — ENCOUNTER SYMPTOMS
ABDOMINAL DISTENTION: 0
COUGH: 0
PHOTOPHOBIA: 0
ABDOMINAL PAIN: 0
SHORTNESS OF BREATH: 0
SINUS PRESSURE: 0
SINUS PAIN: 0
COLOR CHANGE: 0

## 2024-09-25 ENCOUNTER — HOSPITAL ENCOUNTER (OUTPATIENT)
Dept: INFUSION THERAPY | Age: 79
Discharge: HOME | End: 2024-09-25
Payer: MEDICARE

## 2024-09-25 DIAGNOSIS — D64.9 ANEMIA, UNSPECIFIED TYPE: ICD-10-CM

## 2024-09-25 LAB
BASOPHILS # BLD: 0.02 K/UL
BASOPHILS NFR BLD: 0 % (ref 0–1)
EOSINOPHIL # BLD: 0.16 K/UL
EOSINOPHILS RELATIVE PERCENT: 3 % (ref 0–3)
ERYTHROCYTE [DISTWIDTH] IN BLOOD BY AUTOMATED COUNT: 14.1 % (ref 11.7–14.9)
FERRITIN SERPL-MCNC: 80 NG/ML (ref 30–400)
HCT VFR BLD AUTO: 39.7 % (ref 42–52)
HGB BLD-MCNC: 12.5 G/DL (ref 13.5–18)
IRON SATN MFR SERPL: 22 % (ref 15–50)
IRON SERPL-MCNC: 64 UG/DL (ref 59–158)
LYMPHOCYTES NFR BLD: 1.17 K/UL
LYMPHOCYTES RELATIVE PERCENT: 21 % (ref 24–44)
MCH RBC QN AUTO: 30 PG (ref 27–31)
MCHC RBC AUTO-ENTMCNC: 31.5 G/DL (ref 32–36)
MCV RBC AUTO: 95.4 FL (ref 78–100)
MONOCYTES NFR BLD: 0.47 K/UL
MONOCYTES NFR BLD: 9 % (ref 0–4)
NEUTROPHILS NFR BLD: 67 % (ref 36–66)
NEUTS SEG NFR BLD: 3.66 K/UL
PLATELET # BLD AUTO: 188 K/UL (ref 140–440)
PMV BLD AUTO: 9.5 FL (ref 7.5–11.1)
RBC # BLD AUTO: 4.16 M/UL (ref 4.6–6.2)
TIBC SERPL-MCNC: 284 UG/DL (ref 260–445)
UNSATURATED IRON BINDING CAPACITY: 220 UG/DL (ref 110–370)
WBC OTHER # BLD: 5.5 K/UL (ref 4–10.5)

## 2024-09-25 PROCEDURE — 36415 COLL VENOUS BLD VENIPUNCTURE: CPT

## 2024-09-25 PROCEDURE — 83550 IRON BINDING TEST: CPT

## 2024-09-25 PROCEDURE — 83540 ASSAY OF IRON: CPT

## 2024-09-25 PROCEDURE — 82728 ASSAY OF FERRITIN: CPT

## 2024-09-25 PROCEDURE — 85025 COMPLETE CBC W/AUTO DIFF WBC: CPT

## 2024-09-27 ENCOUNTER — HOSPITAL ENCOUNTER (OUTPATIENT)
Dept: INFUSION THERAPY | Age: 79
Discharge: HOME | End: 2024-09-27
Payer: MEDICARE

## 2024-09-27 ENCOUNTER — OFFICE VISIT (OUTPATIENT)
Dept: ONCOLOGY | Age: 79
End: 2024-09-27
Payer: MEDICARE

## 2024-09-27 VITALS
BODY MASS INDEX: 23.28 KG/M2 | WEIGHT: 153.6 LBS | SYSTOLIC BLOOD PRESSURE: 152 MMHG | OXYGEN SATURATION: 100 % | HEART RATE: 81 BPM | HEIGHT: 68 IN | DIASTOLIC BLOOD PRESSURE: 77 MMHG | TEMPERATURE: 97.7 F | RESPIRATION RATE: 16 BRPM

## 2024-09-27 DIAGNOSIS — D64.9 ANEMIA, UNSPECIFIED TYPE: Primary | ICD-10-CM

## 2024-09-27 DIAGNOSIS — D50.0 IRON DEFICIENCY ANEMIA SECONDARY TO BLOOD LOSS (CHRONIC): ICD-10-CM

## 2024-09-27 PROCEDURE — 1111F DSCHRG MED/CURRENT MED MERGE: CPT | Performed by: INTERNAL MEDICINE

## 2024-09-27 PROCEDURE — 3077F SYST BP >= 140 MM HG: CPT | Performed by: INTERNAL MEDICINE

## 2024-09-27 PROCEDURE — 99211 OFF/OP EST MAY X REQ PHY/QHP: CPT

## 2024-09-27 PROCEDURE — G8427 DOCREV CUR MEDS BY ELIG CLIN: HCPCS | Performed by: INTERNAL MEDICINE

## 2024-09-27 PROCEDURE — 1036F TOBACCO NON-USER: CPT | Performed by: INTERNAL MEDICINE

## 2024-09-27 PROCEDURE — G8420 CALC BMI NORM PARAMETERS: HCPCS | Performed by: INTERNAL MEDICINE

## 2024-09-27 PROCEDURE — 99213 OFFICE O/P EST LOW 20 MIN: CPT | Performed by: INTERNAL MEDICINE

## 2024-09-27 PROCEDURE — 1124F ACP DISCUSS-NO DSCNMKR DOCD: CPT | Performed by: INTERNAL MEDICINE

## 2024-09-27 PROCEDURE — 3078F DIAST BP <80 MM HG: CPT | Performed by: INTERNAL MEDICINE

## 2024-09-27 ASSESSMENT — PATIENT HEALTH QUESTIONNAIRE - PHQ9
SUM OF ALL RESPONSES TO PHQ QUESTIONS 1-9: 0
SUM OF ALL RESPONSES TO PHQ QUESTIONS 1-9: 0
SUM OF ALL RESPONSES TO PHQ9 QUESTIONS 1 & 2: 0
1. LITTLE INTEREST OR PLEASURE IN DOING THINGS: NOT AT ALL
SUM OF ALL RESPONSES TO PHQ QUESTIONS 1-9: 0
2. FEELING DOWN, DEPRESSED OR HOPELESS: NOT AT ALL
SUM OF ALL RESPONSES TO PHQ QUESTIONS 1-9: 0

## 2024-12-26 RX ORDER — AMLODIPINE BESYLATE 2.5 MG/1
2.5 TABLET ORAL DAILY
Qty: 90 TABLET | Refills: 3 | Status: SHIPPED | OUTPATIENT
Start: 2024-12-26

## 2025-01-07 ENCOUNTER — HOSPITAL ENCOUNTER (OUTPATIENT)
Age: 80
Discharge: HOME | End: 2025-01-07
Payer: MEDICARE

## 2025-01-07 DIAGNOSIS — E11.9 TYPE 2 DIABETES MELLITUS WITHOUT COMPLICATION, WITHOUT LONG-TERM CURRENT USE OF INSULIN (HCC): ICD-10-CM

## 2025-01-07 DIAGNOSIS — I25.10 CORONARY ARTERY DISEASE INVOLVING NATIVE CORONARY ARTERY OF NATIVE HEART WITHOUT ANGINA PECTORIS: ICD-10-CM

## 2025-01-07 DIAGNOSIS — I10 ESSENTIAL HYPERTENSION: ICD-10-CM

## 2025-01-07 DIAGNOSIS — N18.2 CHRONIC KIDNEY DISEASE, STAGE II (MILD): ICD-10-CM

## 2025-01-07 LAB
ALBUMIN SERPL-MCNC: 4.2 G/DL (ref 3.4–5)
ANION GAP SERPL CALCULATED.3IONS-SCNC: 10 MMOL/L (ref 9–17)
BILIRUB UR QL STRIP: NEGATIVE
BUN SERPL-MCNC: 19 MG/DL (ref 7–20)
CALCIUM SERPL-MCNC: 9.4 MG/DL (ref 8.3–10.6)
CHLORIDE SERPL-SCNC: 104 MMOL/L (ref 99–110)
CLARITY UR: CLEAR
CO2 SERPL-SCNC: 28 MMOL/L (ref 21–32)
COLOR UR: YELLOW
COMMENT: ABNORMAL
CREAT SERPL-MCNC: 1 MG/DL (ref 0.8–1.3)
CREAT UR-MCNC: 62.8 MG/DL (ref 39–259)
GFR, ESTIMATED: 72 ML/MIN/1.73M2
GLUCOSE SERPL-MCNC: 148 MG/DL (ref 74–99)
GLUCOSE UR STRIP-MCNC: >=1000 MG/DL
HGB UR QL STRIP.AUTO: NEGATIVE
KETONES UR STRIP-MCNC: NEGATIVE MG/DL
LEUKOCYTE ESTERASE UR QL STRIP: NEGATIVE
MAGNESIUM SERPL-MCNC: 2.2 MG/DL (ref 1.8–2.4)
NITRITE UR QL STRIP: NEGATIVE
PH UR STRIP: 6 [PH] (ref 5–8)
PHOSPHATE SERPL-MCNC: 3.9 MG/DL (ref 2.5–4.9)
POTASSIUM SERPL-SCNC: 3.9 MMOL/L (ref 3.5–5.1)
PROT UR STRIP-MCNC: NEGATIVE MG/DL
SODIUM SERPL-SCNC: 141 MMOL/L (ref 136–145)
SP GR UR STRIP: 1.01 (ref 1–1.03)
TOTAL PROTEIN, URINE: 9 MG/DL
URINE TOTAL PROTEIN CREATININE RATIO: 0.15 (ref 0–0.2)
UROBILINOGEN UR STRIP-ACNC: 0.2 EU/DL (ref 0–1)

## 2025-01-07 PROCEDURE — 81003 URINALYSIS AUTO W/O SCOPE: CPT

## 2025-01-07 PROCEDURE — 83735 ASSAY OF MAGNESIUM: CPT

## 2025-01-07 PROCEDURE — 82570 ASSAY OF URINE CREATININE: CPT

## 2025-01-07 PROCEDURE — 80048 BASIC METABOLIC PNL TOTAL CA: CPT

## 2025-01-07 PROCEDURE — 82040 ASSAY OF SERUM ALBUMIN: CPT

## 2025-01-07 PROCEDURE — 84156 ASSAY OF PROTEIN URINE: CPT

## 2025-01-07 PROCEDURE — 84100 ASSAY OF PHOSPHORUS: CPT

## 2025-01-30 PROCEDURE — 93294 REM INTERROG EVL PM/LDLS PM: CPT | Performed by: INTERNAL MEDICINE

## 2025-01-30 PROCEDURE — 93296 REM INTERROG EVL PM/IDS: CPT | Performed by: INTERNAL MEDICINE

## 2025-02-17 ENCOUNTER — OFFICE VISIT (OUTPATIENT)
Dept: CARDIOLOGY CLINIC | Age: 80
End: 2025-02-17
Payer: MEDICARE

## 2025-02-17 VITALS
HEART RATE: 69 BPM | OXYGEN SATURATION: 97 % | WEIGHT: 156 LBS | HEIGHT: 68 IN | SYSTOLIC BLOOD PRESSURE: 122 MMHG | DIASTOLIC BLOOD PRESSURE: 72 MMHG | BODY MASS INDEX: 23.64 KG/M2

## 2025-02-17 DIAGNOSIS — I48.0 PAF (PAROXYSMAL ATRIAL FIBRILLATION) (HCC): ICD-10-CM

## 2025-02-17 DIAGNOSIS — Z95.0 CARDIAC PACEMAKER IN SITU: ICD-10-CM

## 2025-02-17 DIAGNOSIS — I10 ESSENTIAL HYPERTENSION: Primary | ICD-10-CM

## 2025-02-17 DIAGNOSIS — I49.5 SINUS NODE DYSFUNCTION (HCC): ICD-10-CM

## 2025-02-17 PROCEDURE — 99214 OFFICE O/P EST MOD 30 MIN: CPT | Performed by: INTERNAL MEDICINE

## 2025-02-17 PROCEDURE — G8427 DOCREV CUR MEDS BY ELIG CLIN: HCPCS | Performed by: INTERNAL MEDICINE

## 2025-02-17 PROCEDURE — 1124F ACP DISCUSS-NO DSCNMKR DOCD: CPT | Performed by: INTERNAL MEDICINE

## 2025-02-17 PROCEDURE — 1036F TOBACCO NON-USER: CPT | Performed by: INTERNAL MEDICINE

## 2025-02-17 PROCEDURE — 3074F SYST BP LT 130 MM HG: CPT | Performed by: INTERNAL MEDICINE

## 2025-02-17 PROCEDURE — 3078F DIAST BP <80 MM HG: CPT | Performed by: INTERNAL MEDICINE

## 2025-02-17 PROCEDURE — 1159F MED LIST DOCD IN RCRD: CPT | Performed by: INTERNAL MEDICINE

## 2025-02-17 PROCEDURE — G8420 CALC BMI NORM PARAMETERS: HCPCS | Performed by: INTERNAL MEDICINE

## 2025-02-17 NOTE — PROGRESS NOTES
Chavez Liu MD        OFFICE  FOLLOWUP NOTE    Chief complaints: patient is here for management of PAF s/p watchman, PPM ( SICK SINUS SYNDROME), CAD, DM, HTN, H/O SYNCOPE, RETINAL HEMORRHAGE     Subjective: patient feels better, no chest pain, no shortness of breath, no dizziness, no palpitations    HPI Cricket is a 79 y.o.year old who  has a past medical history of Abdominal Doppler, Acute MI (HCC), Arthritis, CAD (coronary artery disease), Diabetes mellitus (HCC), Essential hypertension, H/O cardiovascular stress test, H/O echocardiogram, and H/O echocardiogram. and presents for management of PAF s/p watchman, PPM ( SICK SINUS SYNDROME), CAD, DM, HTN, H/O SYNCOPE, RETINAL HEMORRHAGE  which are well controlled      Current Outpatient Medications   Medication Sig Dispense Refill    metFORMIN (GLUCOPHAGE) 500 MG tablet Take 1 tablet by mouth 2 times daily (with meals) 180 tablet 3    amLODIPine (NORVASC) 2.5 MG tablet Take 1 tablet by mouth daily 90 tablet 3    metoprolol tartrate (LOPRESSOR) 25 MG tablet Take 1.5 tablets by mouth 2 times daily 180 tablet 5    dapagliflozin (FARXIGA) 10 MG tablet Take 1 tablet by mouth every morning 90 tablet 3    b complex vitamins capsule Take 1 capsule by mouth daily With Vitamin C      ammonium lactate (LAC-HYDRIN) 12 % lotion Apply topically 2 times daily      Lancets (ONETOUCH DELICA PLUS [id number]) MISC       ONETOUCH VERIO strip       ferrous sulfate 325 (65 Fe) MG tablet Take 1 tablet by mouth daily (with breakfast)      Cyanocobalamin (VITAMIN B 12 PO) Take 500 mg by mouth every other day      aspirin 81 MG tablet Take 1 tablet by mouth daily      atorvastatin (LIPITOR) 10 MG tablet Take 1 tablet by mouth daily       No current facility-administered medications for this visit.     Allergies: Patient has no known allergies.  Past Medical History:   Diagnosis Date    Abdominal Doppler 07/06/2021    Normal study    Acute MI (HCC)     1993 - angioplasty, no

## 2025-02-17 NOTE — PATIENT INSTRUCTIONS
Thank you for allowing us to care for you today!   We want to ensure we can follow your treatment plan and we strive to give you the best outcomes and experience possible.   If you ever have a life threatening emergency and call 911 - for an ambulance (EMS)  REMEMBER  Our providers can only care for you at:   [facility] or [facility]   Even if you have someone take you or you drive yourself we can only care for you in a [facility] facility. Our providers are not setup at the other healthcare locations!    PLEASE CALL OUR OFFICE DURING NORMAL BUSINESS HOURS  Monday through Friday 8 am to 5 pm  AFTER HOURS the physician on-call cannot help with scheduling, rescheduling, procedure instruction questions or any type of medication need or issue.  [facility] P:[phone] - [facility] P:[phone] - [facility] P:[phone]      If you receive a survey:  We would appreciate you taking the time to share your experience concerning your provider visit in the office.    These surveys are confidential!  We are eager to improve and are counting on you to share your feedback so we can ensure you get the best care possible.

## 2025-02-17 NOTE — PROGRESS NOTES
CLINICAL STAFF DOCUMENTATION    Dr. Chavez Ng  1945  [id number]    Have you had any Chest Pain recently? - No        Have you had any Shortness of Breath - No      Have you had any dizziness - No    Have you had any palpitations recently? - No      Do you have any edema - swelling in No         When did you have your last labs drawn [id number]  What doctor ordered stone  Do we have the labs in their chart No  If we do not have the labs, ask where they were drawn  [facility]    If we do not have these labs, you are retrieve these labs for the provider!    Do you have a surgery or procedure scheduled in the near future - No        Check medication list thoroughly!!! AND RECONCILE OUTSIDE MEDICATIONS  If dose has changed change the entire order not just the MG  BE SURE TO ASK PATIENT IF THEY NEED MEDICATION REFILLS  Verify Pharmacy and update if incorrect    At check out add to every patient's \"wrap up\" the following dot phrase AFTERVISITCARDIOHEARTHOUSE and ensure we explain this to our patients

## 2025-03-02 NOTE — PROGRESS NOTES
Patient Name: Cricket Ng  Patient : 1945  Patient MRN: [id number]     Primary Oncologist: Jaxson Rondon MD  Referring Provider: Sunil Kelley MD     Date of Service: 3/27/2025      Chief Complaint:   Chief Complaint   Patient presents with    Follow-up     He came in for follow-up visit.     Patient Active Problem List:     Edema     CAD (coronary artery disease)     Diabetes mellitus      Right inguinal hernia     Anemia, unspecified     Iron deficiency anemia secondary to blood loss (chronic)     Anemia of chronic disorder     Dyslipidemia     Essential hypertension     HPI:   Cricket Ng is a pleasant 79-year-old male patient whom I have been following for chronic anemia since 2009.  He had GI workup, including colonoscopy, EGD, capsule endoscopic study in  by Dr. Earlene Lara.  He was found to have diverticulosis.     2009 BM study showed trilineage hematopoietic marrow, which appeared normocellular for his age.  Iron stains were adequate. FISH for MDS was negative.  Flow cytometry showed no monoclonality.  He was found to have mild leukopenia.      He had surgery on his right toe in 2013 and was hospitalized in 2013 with cellulitis to right lower extremity.  He was treated with antibiotic.  MRI of the right lower extremity at the time showed findings compatible with cellulitis.    Blood test in 2014 showed white cell count of 5.1, hemoglobin 11.3, platelet 231, iron was 96, TIBC 369, transferrin saturation 26, ferritin 105.  He had stool guaiac by Dr. Kelley in May 2014.  He had umbilical hernia surgery in 2014 by Dr. Morelos.   2014 white cell count 4.8, hemoglobin 11.4, platelet 235.  Iron was 95, ferritin 102, transferrin saturation 31.  Thyroid functions were within normal limits.    He had colonoscopy in March or 2015.  Reportedly, he could have slight inflammation to his colon.  He had his stool checked for C. diff, which was

## 2025-03-20 ENCOUNTER — HOSPITAL ENCOUNTER (OUTPATIENT)
Dept: INFUSION THERAPY | Age: 80
Discharge: HOME | End: 2025-03-20
Payer: MEDICARE

## 2025-03-20 DIAGNOSIS — D50.0 IRON DEFICIENCY ANEMIA SECONDARY TO BLOOD LOSS (CHRONIC): ICD-10-CM

## 2025-03-20 LAB
BASOPHILS # BLD: 0.02 K/UL
BASOPHILS NFR BLD: 0 % (ref 0–1)
EOSINOPHIL # BLD: 0.12 K/UL
EOSINOPHILS RELATIVE PERCENT: 2 % (ref 0–3)
ERYTHROCYTE [DISTWIDTH] IN BLOOD BY AUTOMATED COUNT: 14.2 % (ref 11.7–14.9)
FERRITIN SERPL-MCNC: 79 NG/ML (ref 30–400)
HCT VFR BLD AUTO: 40.8 % (ref 42–52)
HGB BLD-MCNC: 13 G/DL (ref 13.5–18)
IRON SATN MFR SERPL: 36 % (ref 15–50)
IRON SERPL-MCNC: 112 UG/DL (ref 59–158)
LYMPHOCYTES NFR BLD: 1.89 K/UL
LYMPHOCYTES RELATIVE PERCENT: 27 % (ref 24–44)
MCH RBC QN AUTO: 30.8 PG (ref 27–31)
MCHC RBC AUTO-ENTMCNC: 31.9 G/DL (ref 32–36)
MCV RBC AUTO: 96.7 FL (ref 78–100)
MONOCYTES NFR BLD: 0.63 K/UL
MONOCYTES NFR BLD: 9 % (ref 0–4)
NEUTROPHILS NFR BLD: 62 % (ref 36–66)
NEUTS SEG NFR BLD: 4.4 K/UL
PLATELET # BLD AUTO: 200 K/UL (ref 140–440)
PMV BLD AUTO: 9.4 FL (ref 7.5–11.1)
RBC # BLD AUTO: 4.22 M/UL (ref 4.6–6.2)
TIBC SERPL-MCNC: 307 UG/DL (ref 260–445)
UNSATURATED IRON BINDING CAPACITY: 195 UG/DL (ref 110–370)
WBC OTHER # BLD: 7.1 K/UL (ref 4–10.5)

## 2025-03-20 PROCEDURE — 83550 IRON BINDING TEST: CPT

## 2025-03-20 PROCEDURE — 82728 ASSAY OF FERRITIN: CPT

## 2025-03-20 PROCEDURE — 85025 COMPLETE CBC W/AUTO DIFF WBC: CPT

## 2025-03-20 PROCEDURE — 36415 COLL VENOUS BLD VENIPUNCTURE: CPT

## 2025-03-20 PROCEDURE — 83540 ASSAY OF IRON: CPT

## 2025-03-27 ENCOUNTER — OFFICE VISIT (OUTPATIENT)
Dept: ONCOLOGY | Age: 80
End: 2025-03-27
Payer: MEDICARE

## 2025-03-27 ENCOUNTER — HOSPITAL ENCOUNTER (OUTPATIENT)
Dept: INFUSION THERAPY | Age: 80
Discharge: HOME | End: 2025-03-27
Payer: MEDICARE

## 2025-03-27 VITALS
HEIGHT: 68 IN | WEIGHT: 157.6 LBS | SYSTOLIC BLOOD PRESSURE: 127 MMHG | TEMPERATURE: 97.9 F | HEART RATE: 82 BPM | RESPIRATION RATE: 16 BRPM | OXYGEN SATURATION: 99 % | DIASTOLIC BLOOD PRESSURE: 64 MMHG | BODY MASS INDEX: 23.89 KG/M2

## 2025-03-27 DIAGNOSIS — D64.9 ANEMIA, UNSPECIFIED TYPE: Primary | ICD-10-CM

## 2025-03-27 PROCEDURE — G8420 CALC BMI NORM PARAMETERS: HCPCS | Performed by: INTERNAL MEDICINE

## 2025-03-27 PROCEDURE — 1124F ACP DISCUSS-NO DSCNMKR DOCD: CPT | Performed by: INTERNAL MEDICINE

## 2025-03-27 PROCEDURE — 3078F DIAST BP <80 MM HG: CPT | Performed by: INTERNAL MEDICINE

## 2025-03-27 PROCEDURE — 3074F SYST BP LT 130 MM HG: CPT | Performed by: INTERNAL MEDICINE

## 2025-03-27 PROCEDURE — G8427 DOCREV CUR MEDS BY ELIG CLIN: HCPCS | Performed by: INTERNAL MEDICINE

## 2025-03-27 PROCEDURE — 1126F AMNT PAIN NOTED NONE PRSNT: CPT | Performed by: INTERNAL MEDICINE

## 2025-03-27 PROCEDURE — 1159F MED LIST DOCD IN RCRD: CPT | Performed by: INTERNAL MEDICINE

## 2025-03-27 PROCEDURE — 1036F TOBACCO NON-USER: CPT | Performed by: INTERNAL MEDICINE

## 2025-03-27 PROCEDURE — 99213 OFFICE O/P EST LOW 20 MIN: CPT | Performed by: INTERNAL MEDICINE

## 2025-03-27 PROCEDURE — 99212 OFFICE O/P EST SF 10 MIN: CPT

## 2025-03-27 ASSESSMENT — PATIENT HEALTH QUESTIONNAIRE - PHQ9
2. FEELING DOWN, DEPRESSED OR HOPELESS: NOT AT ALL
SUM OF ALL RESPONSES TO PHQ QUESTIONS 1-9: 0
1. LITTLE INTEREST OR PLEASURE IN DOING THINGS: NOT AT ALL

## 2025-03-27 NOTE — PROGRESS NOTES
MA Rooming Questions  Patient: Cricket Ng  MRN: [id number]    Date: 3/27/2025        1. Do you have any new issues?   no         2. Do you need any refills on medications?    no    3. Have you had any imaging done since your last visit?   no    4. Have you been hospitalized or seen in the emergency room since your last visit here?   no    5. Did the patient have a depression screening completed today? Yes    No data recorded     PHQ-9 Given to (if applicable):               PHQ-9 Score (if applicable):                     [] Positive     []  Negative              Does question #9 need addressed (if applicable)                     [] Yes    []  No               Coni Harding CMA

## 2025-04-08 ENCOUNTER — OFFICE VISIT (OUTPATIENT)
Age: 80
End: 2025-04-08

## 2025-04-08 VITALS
DIASTOLIC BLOOD PRESSURE: 72 MMHG | SYSTOLIC BLOOD PRESSURE: 120 MMHG | HEIGHT: 69 IN | BODY MASS INDEX: 23.52 KG/M2 | HEART RATE: 83 BPM | WEIGHT: 158.8 LBS

## 2025-04-08 DIAGNOSIS — I10 ESSENTIAL HYPERTENSION: ICD-10-CM

## 2025-04-08 DIAGNOSIS — I48.0 HYPERCOAGULABLE STATE DUE TO PAROXYSMAL ATRIAL FIBRILLATION (HCC): ICD-10-CM

## 2025-04-08 DIAGNOSIS — Z95.0 PACEMAKER: ICD-10-CM

## 2025-04-08 DIAGNOSIS — I48.0 PAF (PAROXYSMAL ATRIAL FIBRILLATION) (HCC): Primary | ICD-10-CM

## 2025-04-08 DIAGNOSIS — D68.69 HYPERCOAGULABLE STATE DUE TO PAROXYSMAL ATRIAL FIBRILLATION (HCC): ICD-10-CM

## 2025-04-08 ASSESSMENT — ENCOUNTER SYMPTOMS
COLOR CHANGE: 0
COUGH: 0
ABDOMINAL DISTENTION: 0
SHORTNESS OF BREATH: 0
PHOTOPHOBIA: 0
ABDOMINAL PAIN: 0
SINUS PRESSURE: 0
SINUS PAIN: 0

## 2025-04-08 NOTE — PATIENT INSTRUCTIONS
Thank you for allowing us to care for you today!   We want to ensure we can follow your treatment plan and we strive to give you the best outcomes and experience possible.   If you ever have a life threatening emergency and call 911 - for an ambulance (EMS)  REMEMBER  Our providers can only care for you at:   [facility] or [facility]   Even if you have someone take you or you drive yourself we can only care for you in a [facility] facility. Our providers are not setup at the other healthcare locations!    PLEASE CALL OUR OFFICE DURING NORMAL BUSINESS HOURS  Monday through Friday 8 am to 5 pm  AFTER HOURS the physician on-call cannot help with scheduling, rescheduling, procedure instruction questions or any type of medication need or issue.  [facility] P:[phone] - [facility] P:[phone] - [facility] P:[phone]      If you receive a survey:  We would appreciate you taking the time to share your experience concerning your provider visit in the office.    These surveys are confidential!  We are eager to improve and are counting on you to share your feedback so we can ensure you get the best care possible.

## 2025-04-08 NOTE — PROGRESS NOTES
inappropriate. If there are any questions or concerns please feel free to contact the dictating provider for clarification.

## 2025-05-01 ENCOUNTER — HOSPITAL ENCOUNTER (OUTPATIENT)
Dept: PULMONOLOGY | Age: 80
Discharge: HOME | End: 2025-05-01
Attending: INTERNAL MEDICINE
Payer: MEDICARE

## 2025-05-01 DIAGNOSIS — R94.2 ABNORMAL PFT: ICD-10-CM

## 2025-05-01 LAB
DLCO %PRED: 87 %
DLCO PRED: NORMAL
DLCO/VA %PRED: 102 %
DLCO/VA PRED: NORMAL
DLCO/VA: NORMAL
DLCO: NORMAL
EXPIRATORY TIME-POST: NORMAL
EXPIRATORY TIME: NORMAL
FEF 25-75 %CHNG: NORMAL
FEF 25-75 POST %PRED: 100 %
FEF 25-75% %PRED-PRE: 102 L/SEC
FEF 25-75% PRED: NORMAL
FEF 25-75-POST: NORMAL
FEF 25-75-PRE: NORMAL
FEV1 %PRED-POST: 103 %
FEV1 %PRED-PRE: 103 %
FEV1 PRED: NORMAL
FEV1-POST: NORMAL
FEV1-PRE: NORMAL
FEV1/FVC %PRED-POST: 104 %
FEV1/FVC %PRED-PRE: 97 %
FEV1/FVC PRED: NORMAL
FEV1/FVC-POST: NORMAL
FEV1/FVC-PRE: NORMAL
FVC %PRED-POST: 98 L
FVC %PRED-PRE: 105 %
FVC PRED: NORMAL
FVC-POST: NORMAL
FVC-PRE: NORMAL
GAW %PRED: NORMAL
GAW PRED: NORMAL
GAW: NORMAL
IC PRE %PRED: 87 %
IC PRED: NORMAL
IC: NORMAL
MEP: NORMAL
MIP: NORMAL
MVV %PRED-PRE: NORMAL
MVV PRED: NORMAL
MVV-PRE: NORMAL
PEF %PRED-POST: NORMAL
PEF %PRED-PRE: NORMAL
PEF PRED: NORMAL
PEF%CHNG: NORMAL
PEF-POST: NORMAL
PEF-PRE: NORMAL
RAW %PRED: NORMAL
RAW PRED: NORMAL
RAW: NORMAL
RV PRE %PRED: 87 %
RV PRED: NORMAL
RV: NORMAL
SVC %PRED: 103 %
SVC PRED: NORMAL
SVC: NORMAL
TLC PRE %PRED: 90 %
TLC PRED: NORMAL
TLC: NORMAL
VA %PRED: 85 %
VA PRED: NORMAL
VA: NORMAL
VTG %PRED: NORMAL
VTG PRED: NORMAL
VTG: NORMAL

## 2025-05-01 PROCEDURE — 94727 GAS DIL/WSHOT DETER LNG VOL: CPT

## 2025-05-01 PROCEDURE — 94729 DIFFUSING CAPACITY: CPT

## 2025-05-01 PROCEDURE — 94060 EVALUATION OF WHEEZING: CPT

## 2025-05-01 ASSESSMENT — PULMONARY FUNCTION TESTS
FEV1_PERCENT_PREDICTED_POST: 103
FVC_PERCENT_PREDICTED_PRE: 105
FEV1/FVC_PERCENT_PREDICTED_POST: 104
FVC_PERCENT_PREDICTED_POST: 98
FEV1_PERCENT_PREDICTED_PRE: 103
FEV1/FVC_PERCENT_PREDICTED_PRE: 97

## 2025-05-05 PROBLEM — J44.9 STAGE 1 MILD COPD BY GOLD CLASSIFICATION (HCC): Status: ACTIVE | Noted: 2025-05-05

## 2025-05-27 RX ORDER — METOPROLOL TARTRATE 25 MG/1
TABLET, FILM COATED ORAL
Qty: 180 TABLET | Refills: 5 | OUTPATIENT
Start: 2025-05-27

## 2025-05-27 RX ORDER — METOPROLOL TARTRATE 25 MG/1
37.5 TABLET, FILM COATED ORAL 2 TIMES DAILY
Qty: 180 TABLET | Refills: 5 | Status: SHIPPED | OUTPATIENT
Start: 2025-05-27

## 2025-07-02 ENCOUNTER — HOSPITAL ENCOUNTER (OUTPATIENT)
Age: 80
Discharge: HOME | End: 2025-07-02
Payer: MEDICARE

## 2025-07-02 DIAGNOSIS — I25.10 CORONARY ARTERY DISEASE INVOLVING NATIVE CORONARY ARTERY OF NATIVE HEART WITHOUT ANGINA PECTORIS: ICD-10-CM

## 2025-07-02 DIAGNOSIS — N18.2 CHRONIC KIDNEY DISEASE, STAGE II (MILD): ICD-10-CM

## 2025-07-02 DIAGNOSIS — I10 ESSENTIAL HYPERTENSION: ICD-10-CM

## 2025-07-02 DIAGNOSIS — E11.9 TYPE 2 DIABETES MELLITUS WITHOUT COMPLICATION, WITHOUT LONG-TERM CURRENT USE OF INSULIN (HCC): ICD-10-CM

## 2025-07-02 DIAGNOSIS — Z95.0 PACEMAKER: ICD-10-CM

## 2025-07-02 LAB
ALBUMIN SERPL-MCNC: 4.2 G/DL (ref 3.4–5)
ANION GAP SERPL CALCULATED.3IONS-SCNC: 12 MMOL/L (ref 9–17)
BILIRUB UR QL STRIP: NEGATIVE
BUN SERPL-MCNC: 18 MG/DL (ref 7–20)
CALCIUM SERPL-MCNC: 9.6 MG/DL (ref 8.3–10.6)
CHLORIDE SERPL-SCNC: 102 MMOL/L (ref 99–110)
CLARITY UR: CLEAR
CO2 SERPL-SCNC: 27 MMOL/L (ref 21–32)
COLOR UR: YELLOW
COMMENT: ABNORMAL
CREAT SERPL-MCNC: 1 MG/DL (ref 0.8–1.3)
CREAT UR-MCNC: 35.2 MG/DL (ref 39–259)
GFR, ESTIMATED: 75 ML/MIN/1.73M2
GLUCOSE SERPL-MCNC: 161 MG/DL (ref 74–99)
GLUCOSE UR STRIP-MCNC: >=1000 MG/DL
HGB UR QL STRIP.AUTO: NEGATIVE
KETONES UR STRIP-MCNC: NEGATIVE MG/DL
LEUKOCYTE ESTERASE UR QL STRIP: NEGATIVE
MAGNESIUM SERPL-MCNC: 2 MG/DL (ref 1.8–2.4)
NITRITE UR QL STRIP: NEGATIVE
PH UR STRIP: 5.5 [PH] (ref 5–8)
PHOSPHATE SERPL-MCNC: 3.8 MG/DL (ref 2.5–4.9)
POTASSIUM SERPL-SCNC: 4 MMOL/L (ref 3.5–5.1)
PROT UR STRIP-MCNC: NEGATIVE MG/DL
SODIUM SERPL-SCNC: 140 MMOL/L (ref 136–145)
SP GR UR STRIP: 1.01 (ref 1–1.03)
TOTAL PROTEIN, URINE: 6 MG/DL
URINE TOTAL PROTEIN CREATININE RATIO: 0.17 (ref 0–0.2)
UROBILINOGEN UR STRIP-ACNC: 0.2 EU/DL (ref 0–1)

## 2025-07-02 PROCEDURE — 82040 ASSAY OF SERUM ALBUMIN: CPT

## 2025-07-02 PROCEDURE — 83735 ASSAY OF MAGNESIUM: CPT

## 2025-07-02 PROCEDURE — 82570 ASSAY OF URINE CREATININE: CPT

## 2025-07-02 PROCEDURE — 84156 ASSAY OF PROTEIN URINE: CPT

## 2025-07-02 PROCEDURE — 84100 ASSAY OF PHOSPHORUS: CPT

## 2025-07-02 PROCEDURE — 80048 BASIC METABOLIC PNL TOTAL CA: CPT

## 2025-07-02 PROCEDURE — 81003 URINALYSIS AUTO W/O SCOPE: CPT

## 2025-07-29 NOTE — PROGRESS NOTES
1154 Patient arrived to PACU from OR. Monitors applied and alarms on. Report from CRISTEL BRADSHAW. 1220 Echo done at bedside in PACU.   1243 Patient tolerating ice chips appropriately. 1250 Patient repositioned in bed.  1254 ART line removed. 1306 Patient blood sugar is 132. No new orders. 1307 Patient transferred out of PACU to cath lab holding. Report to [facility]. [de-identified] : 42 y/o M PMHx DVT (coumadin) . seeing Hematologist tomorrow. Requets letter to Insurance company after being denied for  disabioity  and  life insurance ,